# Patient Record
Sex: FEMALE | Race: WHITE | NOT HISPANIC OR LATINO | Employment: OTHER | URBAN - METROPOLITAN AREA
[De-identification: names, ages, dates, MRNs, and addresses within clinical notes are randomized per-mention and may not be internally consistent; named-entity substitution may affect disease eponyms.]

---

## 2017-01-13 ENCOUNTER — TELEPHONE (OUTPATIENT)
Dept: CARDIOLOGY | Facility: CLINIC | Age: 67
End: 2017-01-13

## 2017-01-13 DIAGNOSIS — I25.10 CORONARY ARTERY DISEASE INVOLVING NATIVE CORONARY ARTERY OF NATIVE HEART WITHOUT ANGINA PECTORIS: ICD-10-CM

## 2017-01-13 LAB
ALT SERPL W P-5'-P-CCNC: <5 U/L (ref 5–30)
CHOLEST SERPL-MCNC: 279 MG/DL
HDLC SERPL-MCNC: 60 MG/DL
LDLC SERPL CALC-MCNC: 197 MG/DL
NONHDLC SERPL-MCNC: 219 MG/DL
TRIGL SERPL-MCNC: 111 MG/DL

## 2017-01-13 PROCEDURE — 80061 LIPID PANEL: CPT | Performed by: INTERNAL MEDICINE

## 2017-01-13 PROCEDURE — 36415 COLL VENOUS BLD VENIPUNCTURE: CPT | Performed by: INTERNAL MEDICINE

## 2017-01-13 PROCEDURE — 84460 ALANINE AMINO (ALT) (SGPT): CPT | Performed by: INTERNAL MEDICINE

## 2017-01-13 NOTE — TELEPHONE ENCOUNTER
"3 week Lipids recheck after starting Pravastatin 40 mg daily as recommended at  with Dr. Bennett 12-22-16. Spoke with patient who states she was only able to take the Pravastatin for 2 weeks before her muscles began to ache and on her own stopped statin about 1 week ago. Muscles feel normal now. Patient states she has \"tried ALL of the statins and could not take any of them refuses to try any other oral agent.  Patient would be interested in trying the PCSK9 inhibitor.  but will be out of town until Feb 13th.   CHOL      279     HDL       60    LDL      197     TRIG      111     ALT < 5  Will message Dr. Bennett    "

## 2017-01-16 NOTE — TELEPHONE ENCOUNTER
Agree. She should call when she returns and Her insurance will determine which to recommend - Praluent or Repatha.  Thx. CD

## 2017-01-17 NOTE — TELEPHONE ENCOUNTER
Spoke with patient, she would like to try the PCSK9 inhibitor. She was in a trial at [facility] for the Pfizer product, so is aware of how the injections work. Will message Team 1 to start the process for this patient.

## 2017-01-18 ENCOUNTER — TELEPHONE (OUTPATIENT)
Dept: CARDIOLOGY | Facility: CLINIC | Age: 67
End: 2017-01-18

## 2017-01-18 DIAGNOSIS — E78.5 HYPERLIPIDEMIA LDL GOAL <130: Primary | ICD-10-CM

## 2017-01-18 NOTE — TELEPHONE ENCOUNTER
Repatha script submitted to  Specialty pharmacy as recommended by  on 12/22/16 office visit. Will contact patient when I hear back from  specialty pharmacy.

## 2017-01-27 ENCOUNTER — TELEPHONE (OUTPATIENT)
Dept: CARDIOLOGY | Facility: CLINIC | Age: 67
End: 2017-01-27

## 2017-01-27 NOTE — TELEPHONE ENCOUNTER
Call from patient, she wanted Dr. Bennett to know that she was approved for the repatha program and should received her first dose on 1/31/17.  Will update Dr. Bennett and Erm[facility] team

## 2017-02-09 ENCOUNTER — TELEPHONE (OUTPATIENT)
Dept: CARDIOLOGY | Facility: CLINIC | Age: 67
End: 2017-02-09

## 2017-02-09 DIAGNOSIS — E78.2 MIXED HYPERLIPIDEMIA: Primary | ICD-10-CM

## 2017-02-09 NOTE — TELEPHONE ENCOUNTER
Per Repatha Team - pt was approved for her repatha. Not sure what  likes for repeat lipids, but typically it is done after the first two doses. (4-6 weeks after starting)   Repatha started on 1 18-17. Then in 6 months and at 1 year. Lipids to be ordered.   Message left for patient to call back.

## 2017-02-09 NOTE — TELEPHONE ENCOUNTER
Spoke with patient who agrees to a FLP/ALT check on 2-27-17 ( 4 weeks after starting the repatha) . Second dose is 2-14-17. Patient states she is feeling well. No difficulties with injections.

## 2017-02-27 ENCOUNTER — TELEPHONE (OUTPATIENT)
Dept: CARDIOLOGY | Facility: CLINIC | Age: 67
End: 2017-02-27

## 2017-02-27 DIAGNOSIS — E78.2 MIXED HYPERLIPIDEMIA: ICD-10-CM

## 2017-02-27 LAB
ALT SERPL W P-5'-P-CCNC: <5 U/L (ref 5–30)
CHOLEST SERPL-MCNC: 172 MG/DL
HDLC SERPL-MCNC: 61 MG/DL
LDLC SERPL CALC-MCNC: 93 MG/DL
NONHDLC SERPL-MCNC: 111 MG/DL
TRIGL SERPL-MCNC: 88 MG/DL

## 2017-02-27 PROCEDURE — 84460 ALANINE AMINO (ALT) (SGPT): CPT | Performed by: INTERNAL MEDICINE

## 2017-02-27 PROCEDURE — 36415 COLL VENOUS BLD VENIPUNCTURE: CPT | Performed by: INTERNAL MEDICINE

## 2017-02-27 PROCEDURE — 80061 LIPID PANEL: CPT | Performed by: INTERNAL MEDICINE

## 2017-02-27 NOTE — TELEPHONE ENCOUNTER
FLP/ALT check on 2-27-17 ( 4 weeks after starting the repatha) . Second dose is 2-14-17. Patient states she is feeling well. No difficulties with injections.   Component      Latest Ref Rng & Units 2/27/2017   Cholesterol      <200 mg/dL 172   Triglycerides      <150 mg/dL 88   HDL Cholesterol      >49 mg/dL 61   LDL Cholesterol Calculated      <100 mg/dL 93   Non HDL Cholesterol      <130 mg/dL 111   ALT    <5.

## 2017-02-28 NOTE — TELEPHONE ENCOUNTER
Patient called with Lipid results. Patient expressed happiness that medication is working. Patient will continue with current medication . Is currently not taking Pravastatin and is only taking Repatha.

## 2017-03-03 ENCOUNTER — TELEPHONE (OUTPATIENT)
Dept: CARDIOLOGY | Facility: CLINIC | Age: 67
End: 2017-03-03

## 2017-03-03 NOTE — TELEPHONE ENCOUNTER
Call attempted to reach Ijeoma Woods at  Specialty pharmacy to ask if a formulary exception can be done on Repatha. Left message with call back number.

## 2017-03-03 NOTE — TELEPHONE ENCOUNTER
Patient  received letter from [employer] and needs A Formulary Exception for Humera had an injection last Tues and has 1 injectable in refrigerator.  Will forward to Humera ORONA

## 2017-03-08 ENCOUNTER — TELEPHONE (OUTPATIENT)
Dept: CARDIOLOGY | Facility: CLINIC | Age: 67
End: 2017-03-08

## 2017-03-08 NOTE — TELEPHONE ENCOUNTER
[facility] Prior Authorization Team   Phone: [phone]  Fax: [phone]    PA Initiation    Medication: Repatha Sureclick  Insurance Company: [employer] - Phone [phone] Fax [phone]  Pharmacy Filling the Rx: [facility] - [place], [place] - [address]  Filling Pharmacy Phone: [phone]  Filling Pharmacy Fax: [phone]  Start Date: 3/8/2017

## 2017-03-13 NOTE — TELEPHONE ENCOUNTER
PRIOR AUTHORIZATION DENIED    Medication: Repatha Sureclick - Denied    Denial Date: 3/11/2017    Denial Rational: Patient has not yet tried/failed Praluent (will require prior authorization).    Appeal Information: Appeals can be faxed to [employer] Part D appeals at [phone]. Please let us know if you would like to appeal this denial. If appealing, please provide a letter of medical necessity. If changing medication, please send new prescription for Praluent to [facility].

## 2017-03-15 ENCOUNTER — TELEPHONE (OUTPATIENT)
Dept: CARDIOLOGY | Facility: CLINIC | Age: 67
End: 2017-03-15

## 2017-03-15 NOTE — TELEPHONE ENCOUNTER
Patient states she received a letter from [employer] that they will not cover the Repatha.  Has had 2 injections and her cholesterol especially LDL has never been this low.  Will forward to Team 1 to help patient with REpatha coverage.

## 2017-03-23 ENCOUNTER — TELEPHONE (OUTPATIENT)
Dept: CARDIOLOGY | Facility: CLINIC | Age: 67
End: 2017-03-23

## 2017-03-23 DIAGNOSIS — I25.10 ATHEROSCLEROSIS OF NATIVE CORONARY ARTERY OF NATIVE HEART WITHOUT ANGINA PECTORIS: ICD-10-CM

## 2017-03-23 DIAGNOSIS — I25.2 HISTORY OF ST ELEVATION MYOCARDIAL INFARCTION (STEMI): ICD-10-CM

## 2017-03-23 DIAGNOSIS — I25.10 CORONARY ARTERY DISEASE INVOLVING NATIVE HEART WITHOUT ANGINA PECTORIS, UNSPECIFIED VESSEL OR LESION TYPE: Primary | ICD-10-CM

## 2017-03-23 DIAGNOSIS — E78.2 MIXED HYPERLIPIDEMIA: ICD-10-CM

## 2017-03-23 NOTE — TELEPHONE ENCOUNTER
Received call from pt inquiring about her Repatha Rx. Repatha was denied because it is not on the formulary - Rx PA for Praluent will need to be started. Sent new Rx for Praluent to  [facility]ty Pharmacy. Pt informed of same. Pt states she actually received a call yesterday from  Specialty pharmacy stating that the Repatha was ready to be shipped and they were wanting to arrange a ship date. I advised pt call that number back and clarify, as what I have in the chart states on 3/11/17 the Repatha was denied d/t not on formulary - pt would need to try Praluent first. Pt verbalized understanding - she will call for clarification with  Specialty pharmacy. In the meantime, I sent a new Rx for Praluent to get it started.         Telephone Encounter  Encounter Date: 3/8/2017  Ijeoma Woods      []Hide copied text  PRIOR AUTHORIZATION DENIED     Medication: Repatha Sureclick - Denied     Denial Date: 3/11/2017     Denial Rational: Patient has not yet tried/failed Praluent (will require prior authorization).     Appeal Information: Appeals can be faxed to [employer] Part D appeals at [phone]. Please let us know if you would like to appeal this denial. If appealing, please provide a letter of medical necessity. If changing medication, please send new prescription for Praluent to [facility].           Electronically signed by Ijeoma Woods at 3/13/2017 11:43 AM        Telephone on 3/8/2017              Detailed Report

## 2017-04-06 ENCOUNTER — TELEPHONE (OUTPATIENT)
Dept: CARDIOLOGY | Facility: CLINIC | Age: 67
End: 2017-04-06

## 2017-04-06 NOTE — TELEPHONE ENCOUNTER
Received VM from pt inquiring on status of Repatha. Pt states she needs the PCSK-9 inhibitor switched to Repatha, per her insurance ([employer]).     Previously spoke with the pt - per her chart Repatha was denied on 3/11 as not on formulary, she must try Praluent first. I sent in a new Rx for Praluent at that time (3/23) to get a PA process started.  Pt informed me she didn't understand how that could be, as she had already received a shipment and has taken 1 month worth of injections of Repatha (not samples). Pt states she also received a VM from the pharmacy wanting to set up the next shipment date. I instructed pt that day to contact  Specialty pharmacy for clarification. I did not hear back. Pt left VM again yesterday inquiring about status.    Placed call to Ijeoma at  Specialty [phone], left VM asking her to please investigate and call me back.

## 2017-04-18 DIAGNOSIS — I25.10 CORONARY ARTERY DISEASE INVOLVING NATIVE CORONARY ARTERY OF NATIVE HEART WITHOUT ANGINA PECTORIS: ICD-10-CM

## 2017-04-18 RX ORDER — CARVEDILOL 6.25 MG/1
6.25 TABLET ORAL 2 TIMES DAILY WITH MEALS
Qty: 186 TABLET | Refills: 2 | Status: SHIPPED | OUTPATIENT
Start: 2017-04-18 | End: 2017-08-25

## 2017-04-25 ENCOUNTER — TELEPHONE (OUTPATIENT)
Dept: CARDIOLOGY | Facility: CLINIC | Age: 67
End: 2017-04-25

## 2017-04-25 NOTE — TELEPHONE ENCOUNTER
[facility] Prior Authorization Team   Phone: [phone]  Fax: [phone]    PA Initiation    Medication: PRALUENT 75MG  Insurance Company: [employer] - Phone [phone] Fax [phone]  Pharmacy Filling the Rx: [facility] - [place], [place] - [address]  Filling Pharmacy Phone: [phone]  Filling Pharmacy Fax: [phone]  Start Date: 4/25/2017    INSURANCE IS ASKING FOR LDL LEVELS FROM WITHIN THE LAST 30 DAYS BEFORE THEY PROCESS THE PA. PLEASE ADVISE

## 2017-05-04 ENCOUNTER — TELEPHONE (OUTPATIENT)
Dept: CARDIOLOGY | Facility: CLINIC | Age: 67
End: 2017-05-04

## 2017-05-04 DIAGNOSIS — E78.2 MIXED HYPERLIPIDEMIA: Primary | ICD-10-CM

## 2017-05-04 NOTE — TELEPHONE ENCOUNTER
Call placed to  Specialty pharmacy PA team, as we are still unclear on our end whether or not the insurance is covering Repatha or Praluent. See previous telephone notes from March - April regarding this issue. Previous call placed on 4/6 but never heard back. Spoke with a member of the PA team who is going to forward this on to Ijeoma to investigate and get back to us.

## 2017-05-04 NOTE — TELEPHONE ENCOUNTER
Per insurance carrier - patient needs to have LDL within 30 days for them to process the PA for Praluent. Order placed. When the new LDL lab is received, we will need to send in previous higher LDL levels to the insurance company along with the new result, as patient has been on Repatha for a few months which has contributed to her recent lower LDL. LDL 1/13/17 was 197 and then after the first month on Repatha her LDL dropped to 97 on 2/27/17.     Called patient to schedule FLP lab. She booked an appt for tomorrow morning. Reviewed fasting requirements.

## 2017-05-05 DIAGNOSIS — E78.2 MIXED HYPERLIPIDEMIA: ICD-10-CM

## 2017-05-05 LAB
CHOLEST SERPL-MCNC: 208 MG/DL
HDLC SERPL-MCNC: 60 MG/DL
LDLC SERPL CALC-MCNC: 128 MG/DL
NONHDLC SERPL-MCNC: 148 MG/DL
TRIGL SERPL-MCNC: 100 MG/DL

## 2017-05-05 PROCEDURE — 80061 LIPID PANEL: CPT | Performed by: INTERNAL MEDICINE

## 2017-05-05 PROCEDURE — 36415 COLL VENOUS BLD VENIPUNCTURE: CPT | Performed by: INTERNAL MEDICINE

## 2017-05-08 NOTE — TELEPHONE ENCOUNTER
Prior Authorization Approval    Authorization Effective Date: 2/5/2017  Authorization Expiration Date: 5/6/2018  Medication: PRALUENT 75MG(APPROVED)  Approved Dose/Quantity: 2  Reference #: [id number]   Insurance Company: [employer] - Phone [phone] Fax [phone]  Expected CoPay:       CoPay Card Available:      Foundation Assistance Needed:    Which Pharmacy is filling the prescription (Not needed for infusion/clinic administered): [facility] - [place] [address]  Pharmacy Notified: Yes  Patient Notified: Yes

## 2017-05-12 ENCOUNTER — TELEPHONE (OUTPATIENT)
Dept: CARDIOLOGY | Facility: CLINIC | Age: 67
End: 2017-05-12

## 2017-05-12 NOTE — TELEPHONE ENCOUNTER
Patient called requesting the FLP results  done 5-5-17. Patient states her Feb FLP's were drawn while she was on Repatha. Then patient was changed to Praulent and FLP's drawn 5-5-17 were done after her second injection. Cholesterol and LDL were noted higher. Patient's third Praulent injection was 5-11-17.

## 2017-08-17 ENCOUNTER — DOCUMENTATION ONLY (OUTPATIENT)
Dept: LAB | Facility: CLINIC | Age: 67
End: 2017-08-17

## 2017-08-17 DIAGNOSIS — I25.10 CORONARY ARTERY DISEASE INVOLVING NATIVE CORONARY ARTERY OF NATIVE HEART WITHOUT ANGINA PECTORIS: ICD-10-CM

## 2017-08-17 DIAGNOSIS — D50.8 OTHER IRON DEFICIENCY ANEMIA: ICD-10-CM

## 2017-08-17 DIAGNOSIS — Z00.00 ROUTINE GENERAL MEDICAL EXAMINATION AT A HEALTH CARE FACILITY: Primary | ICD-10-CM

## 2017-08-17 DIAGNOSIS — E78.2 MIXED HYPERLIPIDEMIA: ICD-10-CM

## 2017-08-17 DIAGNOSIS — E55.9 VITAMIN D DEFICIENCY: ICD-10-CM

## 2017-08-17 DIAGNOSIS — R73.9 ELEVATED BLOOD SUGAR: ICD-10-CM

## 2017-08-17 NOTE — PROGRESS NOTES
Pt is coming for PV physical lab on 8/25/17. I have pended future orders. Please review, associate diagnosis, and sign pended order.     Thanks, Lab

## 2017-08-24 NOTE — PROGRESS NOTES
SUBJECTIVE:   Madonna Duque is a 66 year old female who presents for Preventive Visit.    Overall she is doing well, using injectable cholesterol med now as noted via cards, intol of statins.  Works out very reg.      Did have syncope, 3 weeks ago, very short lived.  Woke and nauseated, then went to bathroom and lh and then short lived syncope.  No chest pain or shortness of breath, none since or before, not having palp or lh.      She otherwise feels fine, 2 uti's this year, fine now, no vag c/o.  Had gi upset after trip, ok now.               Past Medical History:      Past Medical History:   Diagnosis Date     Abdominal discomfort 12/13    ct abd and pelvis no cause found     Chest pain 2/14    neg est echo     Colonic polyp 2005    fu 2011 nl and to fu 2021     Coronary artery disease involving native coronary artery of native heart without angina pectoris      Dizzy 2009    mri brain nl     DJD (degenerative joint disease)     neck     Elevated blood sugar      GERD (gastroesophageal reflux disease) 2007    egd, benign stricture     Hypercholesteremia 2010    crestor intolerant, lipitor intolerant     CORINA (iron deficiency anaemia) 2008    ugi and sbft nl, be nl     Left ventricular diastolic dysfunction 1/16    echo done for fu and nl ef, no wma     Osteopenia     fu dexa 2011 -1.9 fem neck and -1.2 spine, stable c/w 2007     PONV (postoperative nausea and vomiting)      STEMI (ST elevation myocardial infarction) (H) 12/15    in [place] on cruise, lytics then ptca of lad, 2 stents, had vfib arrest, ef on angio 67%, 40% rca, no other dz     Urethral strictures      Ventricular fibrillation (H) 12/15    during mi     Vitamin D deficiency              Past Surgical History:      Past Surgical History:   Procedure Laterality Date     C LIGATE FALLOPIAN TUBE,POSTPARTUM  85    Tubal Ligation     HC REMOVAL OF OVARY/TUBE(S)  80    Salpingo-Oophorectomy, Unilateral(endometriosis)     LAPAROTOMY EXPLORATORY  79     due to endomet, removed ovary     REPAIR PTOSIS BILATERAL  2/7/2012    Procedure:REPAIR PTOSIS BILATERAL; BILATERAL UPPER LID MECHANICAL PTOSIS REPAIR,and ptosis repair; Surgeon:JOVANA BETANCUR; Location[facility]             Social History:     Social History     Social History     Marital status:      Spouse name: N/A     Number of children: 1     Years of education: N/A     Occupational History     rn, retired 2015 [employer],[address]     Social History Main Topics     Smoking status: Former Smoker     Quit date: 1/28/1982     Smokeless tobacco: Never Used      Comment: quit 23 years ago     Alcohol use No     Drug use: No     Sexual activity: Yes     Partners: Male     Other Topics Concern     Special Diet No     heart healthy, weight watches      Exercise No     Social History Narrative             Family History:   reviewed         Allergies:     Allergies   Allergen Reactions     Crestor [Rosuvastatin]      Muscle aches, debilitating     Codeine Nausea and Vomiting     Shrimp Nausea and Vomiting             Medications:     Current Outpatient Prescriptions   Medication Sig Dispense Refill     carvedilol (COREG) 6.25 MG tablet Take 1 tablet (6.25 mg) by mouth 2 times daily (with meals) 186 tablet 3     STATIN NOT PRESCRIBED, INTENTIONAL, Please choose reason not prescribed, below       alirocumab (PRALUENT) 75 MG/ML injectable pen Inject 1 mL (75 mg) Subcutaneous every 14 days 2 mL 11     aspirin 81 MG EC tablet Take 1 tablet (81 mg) by mouth daily 90 tablet 3     Cholecalciferol (VITAMIN D3 PO) Take 5,000 Units by mouth daily       omeprazole (PRILOSEC) 20 MG capsule Take 20 mg by mouth daily.       [DISCONTINUED] carvedilol (COREG) 6.25 MG tablet Take 1 tablet (6.25 mg) by mouth 2 times daily (with meals) 186 tablet 2     nitroglycerin (NITROSTAT) 0.4 MG SL tablet Place 1 tablet (0.4 mg) under the tongue every 5 minutes as needed for chest pain If you are still having  "symptoms after 3 doses (15 minutes) call 911. 25 tablet 0               Review of Systems:   The 10 point Review of Systems is negative other than noted in the HPI           Physical Exam:   Blood pressure 103/68, pulse 73, temperature 98.2  F (36.8  C), temperature source Oral, height 5' 4.75\" (1.645 m), weight 145 lb (65.8 kg), SpO2 100 %, not currently breastfeeding.    Exam:  Constitutional: healthy appearing, alert and in no distress  Heent: Normocephalic. Head without obvious masses or lesions. PERRLDC, EOMI. Mouth exam within normal limits: tongue, mucous membranes, posterior pharynx all normal, no lesions or abnormalities seen.  Tm's and canals within normal limits bilaterally. Neck supple, no nuchal rigidity or masses. No supraclavicular, or cervical adenopathy. Thyroid symmetric, no masses.  Cardiovascular: Regular rate and rhythm, no murmer, rub or gallops.  JVP not elevated, no edema.  Carotids within normal limits bilaterally, no bruits.  Respiratory: Normal respiratory effort.  Lungs clear, normal flow, no wheezing or crackles.  Breasts: Normal bilaterally.  No masses or lesions.  Nipples within normal limits.  No axillary lesions or nodes.  Gastrointestinal: Normal active bowel sounds.   Soft, not tender, no masses, guarding or rebound.  No hepatosplenomegaly.   Musculoskeletal: extremities normal, no gross deformities noted.  Skin: no suspicious lesions or rashes   Neurologic: Mental status within normal limits.  Speech fluent.  No gross motor abnormalities and gait intact.  Psychiatric: mentation appears normal and affect normal.         Data:   Labs sent        Assessment:   1. Normal cpx  2. Ascvd, stents, stable on med therapy  3. Elevated cholesterol with statin intol, on psck9 med via cards, follow up labs  4. Vtach, due to mi, none since and follow up echo normal ef  5. Syncope, most c/w vasovagal.  However, given her pmh d/w patient need to follow up with cards and she will call there today " and let them know  6. Vit d defic, follow up labs  7. Osteopenia, calcium and vit d  8. Gerd, controlled  9. Colon polyp, up to date on follow up  10. hcm         Plan:   Up to date immunizations, getting mammogram today, up to date colon  Exercise, diet  Follow up cards as noted  Letter with labs      Jesse Tejada M.D.              Are you in the first 12 months of your Medicare Part B coverage?  No    Healthy Habits:    Do you get at least three servings of calcium containing foods daily (dairy, green leafy vegetables, etc.)? yes    Amount of exercise or daily activities, outside of work: 7 day(s) per week    Problems taking medications regularly No    Medication side effects: No    Have you had an eye exam in the past two years? yes    Do you see a dentist twice per year? yes    Do you have sleep apnea, excessive snoring or daytime drowsiness?no    COGNITIVE SCREEN  1) Repeat 3 items (Banana, Sunrise, Chair)    2) Clock draw:   3) 3 item recall:   Results: 3 items recalled: COGNITIVE IMPAIRMENT LESS LIKELY    Mini-CogTM Copyright S Silvino. Licensed by the author for use in [facility]; reprinted with permission (queta@[facility]). All rights reserved.                    Reviewed and updated as needed this visit by clinical staff         Reviewed and updated as needed this visit by Provider      Social History   Substance Use Topics     Smoking status: Former Smoker     Quit date: 1/28/1982     Smokeless tobacco: Never Used      Comment: quit 23 years ago     Alcohol use No       The patient does not drink >3 drinks per day nor >7 drinks per week.    Today's PHQ-2 Score:   PHQ-2 ( 1999 Pfizer) 11/8/2016 7/14/2016   Q1: Little interest or pleasure in doing things 0 0   Q2: Feeling down, depressed or hopeless 0 0   PHQ-2 Score 0 0         Do you feel safe in your environment - Yes    Do you have a Health Care Directive?: No: Advance care planning was reviewed with patient; patient declined at this  "time.      Current providers sharing in care for this patient include: Patient Care Team:  Jesse Tejada MD as PCP - General      Hearing impairment: No    Ability to successfully perform activities of daily living: Yes, no assistance needed     Fall risk:         Home safety:  none identified  click delete button to remove this line now    The following health maintenance items are reviewed in Epic and correct as of today:Health Maintenance   Topic Date Due     MAMMO SCREEN Q2 YR (SYSTEM ASSIGNED)  06/05/2017     FALL RISK ASSESSMENT  07/14/2017     INFLUENZA VACCINE (SYSTEM ASSIGNED)  09/01/2017     ADVANCE DIRECTIVE PLANNING Q5 YRS  12/17/2017     COLON CANCER SCREEN (SYSTEM ASSIGNED)  03/18/2021     LIPID SCREEN Q5 YR FEMALE (SYSTEM ASSIGNED)  05/05/2022     TETANUS IMMUNIZATION (SYSTEM ASSIGNED)  12/17/2022     DEXA SCAN SCREENING (SYSTEM ASSIGNED)  Completed     PNEUMOCOCCAL  Completed     HEPATITIS C SCREENING  Completed       End of Life Planning:  Patient currently has an advanced directive: No.  I have verified the patient's ablity to prepare an advanced directive/make health care decisions.  Literature was provided to assist patient in preparing an advanced directive.    COUNSELING:  Reviewed preventive health counseling, as reflected in patient instructions       Regular exercise       Healthy diet/nutrition        Estimated body mass index is 24.32 kg/(m^2) as calculated from the following:    Height as of 12/22/16: 5' 4.75\" (1.645 m).    Weight as of 12/22/16: 145 lb (65.8 kg).     reports that she quit smoking about 35 years ago. She has never used smokeless tobacco.        Appropriate preventive services were discussed with this patient, including applicable screening as appropriate for cardiovascular disease, diabetes, osteopenia/osteoporosis, and glaucoma.  As appropriate for age/gender, discussed screening for colorectal cancer, prostate cancer, breast cancer, and cervical cancer. " Checklist reviewing preventive services available has been given to the patient.    Reviewed patients plan of care and provided an AVS. The Basic Care Plan (routine screening as documented in Health Maintenance) for Madonna meets the Care Plan requirement. This Care Plan has been established and reviewed with the Patient.    Counseling Resources:  ATP IV Guidelines  Pooled Cohorts Equation Calculator  Breast Cancer Risk Calculator  FRAX Risk Assessment  ICSI Preventive Guidelines  Dietary Guidelines for Americans, 2010  USDA's MyPlate  ASA Prophylaxis  Lung CA Screening    Jesse Tejada MD  [facility]

## 2017-08-25 ENCOUNTER — OFFICE VISIT (OUTPATIENT)
Dept: FAMILY MEDICINE | Facility: CLINIC | Age: 67
End: 2017-08-25
Payer: COMMERCIAL

## 2017-08-25 ENCOUNTER — TELEPHONE (OUTPATIENT)
Dept: CARDIOLOGY | Facility: CLINIC | Age: 67
End: 2017-08-25

## 2017-08-25 ENCOUNTER — HOSPITAL ENCOUNTER (OUTPATIENT)
Dept: MAMMOGRAPHY | Facility: CLINIC | Age: 67
Discharge: HOME | End: 2017-08-25
Attending: INTERNAL MEDICINE | Admitting: INTERNAL MEDICINE
Payer: MEDICARE

## 2017-08-25 VITALS
WEIGHT: 145 LBS | SYSTOLIC BLOOD PRESSURE: 103 MMHG | DIASTOLIC BLOOD PRESSURE: 68 MMHG | BODY MASS INDEX: 24.16 KG/M2 | TEMPERATURE: 98.2 F | OXYGEN SATURATION: 100 % | HEIGHT: 65 IN | HEART RATE: 73 BPM

## 2017-08-25 DIAGNOSIS — R73.9 ELEVATED BLOOD SUGAR: ICD-10-CM

## 2017-08-25 DIAGNOSIS — E78.2 MIXED HYPERLIPIDEMIA: ICD-10-CM

## 2017-08-25 DIAGNOSIS — I49.01 VENTRICULAR FIBRILLATION (H): ICD-10-CM

## 2017-08-25 DIAGNOSIS — E55.9 VITAMIN D DEFICIENCY: ICD-10-CM

## 2017-08-25 DIAGNOSIS — Z12.31 VISIT FOR SCREENING MAMMOGRAM: ICD-10-CM

## 2017-08-25 DIAGNOSIS — K63.5 POLYP OF COLON, UNSPECIFIED PART OF COLON, UNSPECIFIED TYPE: ICD-10-CM

## 2017-08-25 DIAGNOSIS — Z00.00 ROUTINE GENERAL MEDICAL EXAMINATION AT A HEALTH CARE FACILITY: ICD-10-CM

## 2017-08-25 DIAGNOSIS — I21.02 ST ELEVATION MYOCARDIAL INFARCTION INVOLVING LEFT ANTERIOR DESCENDING (LAD) CORONARY ARTERY (H): ICD-10-CM

## 2017-08-25 DIAGNOSIS — K21.9 GASTROESOPHAGEAL REFLUX DISEASE WITHOUT ESOPHAGITIS: ICD-10-CM

## 2017-08-25 DIAGNOSIS — I25.10 CORONARY ARTERY DISEASE INVOLVING NATIVE CORONARY ARTERY OF NATIVE HEART WITHOUT ANGINA PECTORIS: ICD-10-CM

## 2017-08-25 DIAGNOSIS — R55 SYNCOPE, UNSPECIFIED SYNCOPE TYPE: ICD-10-CM

## 2017-08-25 DIAGNOSIS — D50.8 OTHER IRON DEFICIENCY ANEMIA: ICD-10-CM

## 2017-08-25 DIAGNOSIS — R55 SYNCOPE: Primary | ICD-10-CM

## 2017-08-25 DIAGNOSIS — M85.80 OSTEOPENIA, UNSPECIFIED LOCATION: ICD-10-CM

## 2017-08-25 DIAGNOSIS — Z00.00 ROUTINE GENERAL MEDICAL EXAMINATION AT A HEALTH CARE FACILITY: Primary | ICD-10-CM

## 2017-08-25 LAB
ALBUMIN SERPL-MCNC: 3.9 G/DL (ref 3.4–5)
ALP SERPL-CCNC: 102 U/L (ref 40–150)
ALT SERPL W P-5'-P-CCNC: 25 U/L (ref 0–50)
ANION GAP SERPL CALCULATED.3IONS-SCNC: 6 MMOL/L (ref 3–14)
AST SERPL W P-5'-P-CCNC: 19 U/L (ref 0–45)
BILIRUB SERPL-MCNC: 0.6 MG/DL (ref 0.2–1.3)
BUN SERPL-MCNC: 18 MG/DL (ref 7–30)
CALCIUM SERPL-MCNC: 9.7 MG/DL (ref 8.5–10.1)
CHLORIDE SERPL-SCNC: 105 MMOL/L (ref 94–109)
CHOLEST SERPL-MCNC: 173 MG/DL
CO2 SERPL-SCNC: 30 MMOL/L (ref 20–32)
CREAT SERPL-MCNC: 0.66 MG/DL (ref 0.52–1.04)
ERYTHROCYTE [DISTWIDTH] IN BLOOD BY AUTOMATED COUNT: 13.9 % (ref 10–15)
FERRITIN SERPL-MCNC: 32 NG/ML (ref 8–252)
GFR SERPL CREATININE-BSD FRML MDRD: 90 ML/MIN/1.7M2
GLUCOSE SERPL-MCNC: 108 MG/DL (ref 70–99)
HBA1C MFR BLD: 5.9 % (ref 4.3–6)
HCT VFR BLD AUTO: 41 % (ref 35–47)
HDLC SERPL-MCNC: 55 MG/DL
HGB BLD-MCNC: 13.6 G/DL (ref 11.7–15.7)
IRON SATN MFR SERPL: 18 % (ref 15–46)
IRON SERPL-MCNC: 74 UG/DL (ref 35–180)
LDLC SERPL CALC-MCNC: 81 MG/DL
MCH RBC QN AUTO: 29.7 PG (ref 26.5–33)
MCHC RBC AUTO-ENTMCNC: 33.2 G/DL (ref 31.5–36.5)
MCV RBC AUTO: 90 FL (ref 78–100)
NONHDLC SERPL-MCNC: 118 MG/DL
PLATELET # BLD AUTO: 246 10E9/L (ref 150–450)
POTASSIUM SERPL-SCNC: 4.7 MMOL/L (ref 3.4–5.3)
PROT SERPL-MCNC: 7.4 G/DL (ref 6.8–8.8)
RBC # BLD AUTO: 4.58 10E12/L (ref 3.8–5.2)
SODIUM SERPL-SCNC: 141 MMOL/L (ref 133–144)
TIBC SERPL-MCNC: 400 UG/DL (ref 240–430)
TRIGL SERPL-MCNC: 184 MG/DL
WBC # BLD AUTO: 5.9 10E9/L (ref 4–11)

## 2017-08-25 PROCEDURE — G0439 PPPS, SUBSEQ VISIT: HCPCS | Performed by: INTERNAL MEDICINE

## 2017-08-25 PROCEDURE — 80053 COMPREHEN METABOLIC PANEL: CPT | Performed by: INTERNAL MEDICINE

## 2017-08-25 PROCEDURE — 83036 HEMOGLOBIN GLYCOSYLATED A1C: CPT | Performed by: INTERNAL MEDICINE

## 2017-08-25 PROCEDURE — 82728 ASSAY OF FERRITIN: CPT | Performed by: INTERNAL MEDICINE

## 2017-08-25 PROCEDURE — 82306 VITAMIN D 25 HYDROXY: CPT | Performed by: INTERNAL MEDICINE

## 2017-08-25 PROCEDURE — 83550 IRON BINDING TEST: CPT | Performed by: INTERNAL MEDICINE

## 2017-08-25 PROCEDURE — 83540 ASSAY OF IRON: CPT | Performed by: INTERNAL MEDICINE

## 2017-08-25 PROCEDURE — 80061 LIPID PANEL: CPT | Performed by: INTERNAL MEDICINE

## 2017-08-25 PROCEDURE — 36415 COLL VENOUS BLD VENIPUNCTURE: CPT | Performed by: INTERNAL MEDICINE

## 2017-08-25 PROCEDURE — G0202 SCR MAMMO BI INCL CAD: HCPCS

## 2017-08-25 PROCEDURE — 85027 COMPLETE CBC AUTOMATED: CPT | Performed by: INTERNAL MEDICINE

## 2017-08-25 RX ORDER — CARVEDILOL 6.25 MG/1
6.25 TABLET ORAL 2 TIMES DAILY WITH MEALS
Qty: 186 TABLET | Refills: 3 | Status: SHIPPED | OUTPATIENT
Start: 2017-08-25 | End: 2017-09-27

## 2017-08-25 NOTE — TELEPHONE ENCOUNTER
Recommend decrease carvedilol to 3.125 mg BID.  Recommend 24 hour Holter. Would also suggest sleep study and follow-up with SIDNEY. This is probably vagal syncope but we are more vulnerable to symptoms if we have sleep apnea.  CD

## 2017-08-25 NOTE — MR AVS SNAPSHOT
After Visit Summary   8/25/2017    Madonna Duque    MRN: [id number]           Patient Information     Date Of Birth          1950        Visit Information        Provider Department      8/25/2017 8:00 AM Jesse Tejada MD [facility]        Today's Diagnoses     Routine general medical examination at a health care facility    -  1    Ventricular fibrillation (H)        ST elevation myocardial infarction involving left anterior descending (LAD) coronary artery (H)        Other iron deficiency anemia        Polyp of colon, unspecified part of colon, unspecified type        Mixed hyperlipidemia        Vitamin D deficiency        Osteopenia, unspecified location        Elevated blood sugar        Gastroesophageal reflux disease without esophagitis        Coronary artery disease involving native coronary artery of native heart without angina pectoris          Care Instructions      Preventive Health Recommendations    Female Ages 65 +    Yearly exam:     See your health care provider every year in order to  o Review health changes.   o Discuss preventive care.    o Review your medicines if your doctor has prescribed any.      You no longer need a yearly Pap test unless you've had an abnormal Pap test in the past 10 years. If you have vaginal symptoms, such as bleeding or discharge, be sure to talk with your provider about a Pap test.      Every 1 to 2 years, have a mammogram.  If you are over 69, talk with your health care provider about whether or not you want to continue having screening mammograms.      Every 10 years, have a colonoscopy. Or, have a yearly FIT test (stool test). These exams will check for colon cancer.       Have a cholesterol test every 5 years, or more often if your doctor advises it.       Have a diabetes test (fasting glucose) every three years. If you are at risk for diabetes, you should have this test more often.       At age 65, have a bone density  scan (DEXA) to check for osteoporosis (brittle bone disease).    Shots:    Get a flu shot each year.    Get a tetanus shot every 10 years.    Talk to your doctor about your pneumonia vaccines. There are now two you should receive - Pneumovax (PPSV 23) and Prevnar (PCV 13).    Talk to your doctor about the shingles vaccine.    Talk to your doctor about the hepatitis B vaccine.    Nutrition:     Eat at least 5 servings of fruits and vegetables each day.      Eat whole-grain bread, whole-wheat pasta and brown rice instead of white grains and rice.      Talk to your provider about Calcium and Vitamin D.     Lifestyle    Exercise at least 150 minutes a week (30 minutes a day, 5 days a week). This will help you control your weight and prevent disease.      Limit alcohol to one drink per day.      No smoking.       Wear sunscreen to prevent skin cancer.       See your dentist twice a year for an exam and cleaning.      See your eye doctor every 1 to 2 years to screen for conditions such as glaucoma, macular degeneration and cataracts.          Follow-ups after your visit        Your next 10 appointments already scheduled     Aug 25, 2017  9:45 AM CDT   MA SCREENING DIGITAL BILATERAL with SHBCMA4   [facility] ([facility])    [address] [place] [address], [address] [place] [id number] [phone]           Do not use any powder, lotion or deodorant under your arms or on your breast. If you do, we will ask you to remove it before your exam.  Wear comfortable, two-piece clothing.  If you have any allergies, tell your care team.  Bring any previous mammograms from other facilities or have them mailed to the breast center. Three-dimensional (3D) mammograms are available at [place] locations in [facility], and [place]. -Health locations include [place] [facility] in [place]. Benefits of 3D mammograms include: - Improved  "rate of cancer detection - Decreases your chance of having to go back for more tests, which means fewer: - \"False-positive\" results (This means that there is an abnormal area but it isn't cancer.) - Invasive testing procedures, such as a biopsy or surgery - Can provide clearer images of the breast if you have dense breast tissue. 3D mammography is an optional exam that anyone can have with a 2D mammogram. It doesn't replace or take the place of a 2D mammogram. 2D mammograms remain an effective screening test for all women.  Not all insurance companies cover the cost of a 3D mammogram. Check with your insurance.              Who to contact     If you have questions or need follow up information about today's clinic visit or your schedule please contact [facility] directly at [phone].  Normal or non-critical lab and imaging results will be communicated to you by [employer], letter or phone within 4 business days after the clinic has received the results. If you do not hear from us within 7 days, please contact the clinic through [employer] or phone. If you have a critical or abnormal lab result, we will notify you by phone as soon as possible.  Submit refill requests through [employer] or call your pharmacy and they will forward the refill request to us. Please allow 3 business days for your refill to be completed.          Additional Information About Your Visit        [employer] Information     [employer] lets you send messages to your doctor, view your test results, renew your prescriptions, schedule appointments and more. To sign up, go to [email or web address] . Click on \"Log in\" on the left side of the screen, which will take you to the Welcome page. Then click on \"Sign up Now\" on the right side of the page.     You will be asked to enter the access code listed below, as well as some personal information. Please follow the directions to create your username and password.     Your access code is: " [email or web address]  Expires: 2017  8:13 AM     Your access code will  in 90 days. If you need help or a new code, please call your [facility] clinic or [phone].        Care EveryWhere ID     This is your Care EveryWhere ID. This could be used by other organizations to access your [facility] medical records  [id number]        Your Vitals Were     Pulse Temperature Height Pulse Oximetry Breastfeeding? BMI (Body Mass Index)    73 98.2  F (36.8  C) (Oral) 5' 4.75\" (1.645 m) 100% No 24.32 kg/m2       Blood Pressure from Last 3 Encounters:   17 103/68   16 112/60   16 121/73    Weight from Last 3 Encounters:   17 145 lb (65.8 kg)   16 145 lb (65.8 kg)   16 144 lb (65.3 kg)              Today, you had the following     No orders found for display         Today's Medication Changes          These changes are accurate as of: 17  8:13 AM.  If you have any questions, ask your nurse or doctor.               Stop taking these medicines if you haven't already. Please contact your care team if you have questions.     EFFIENT 10 MG Tabs tablet   Generic drug:  prasugrel   Stopped by:  Jesse Tejada MD           evolocumab 140 MG/ML prefilled autoinjector   Commonly known as:  REPATHA   Stopped by:  Jesse Tejada MD                Where to get your medicines      These medications were sent to [facility] - [place], [place] - [address]  [address] , [facility] [address]    Hours:  Mon-Fri 8:30am-5:00pm Toll Free [phone] Phone:  [phone]     carvedilol 6.25 MG tablet                Primary Care Provider Office Phone # Fax #    Jesse Tejada [phone] [address]  [facility] [address]        Equal Access to Services     SORAIDA GARCIA AH: Steff Roldan, waaxda hossein jean waxay idiin hayaan adeeg kharash la'aan ah. So [facility] " [phone].    ATENCIÓN: Si vinny cosme, tiene a miller disposición servicios gratuitos de asistencia lingüística. Bridger vences [phone].    We comply with applicable federal civil rights laws and [place] laws. We do not discriminate on the basis of race, color, national origin, age, disability sex, sexual orientation or gender identity.            Thank you!     Thank you for choosing [facility]  for your care. Our goal is always to provide you with excellent care. Hearing back from our patients is one way we can continue to improve our services. Please take a few minutes to complete the written survey that you may receive in the mail after your visit with us. Thank you!             Your Updated Medication List - Protect others around you: Learn how to safely use, store and throw away your medicines at www.disposemymeds.org.          This list is accurate as of: 8/25/17  8:13 AM.  Always use your most recent med list.                   Brand Name Dispense Instructions for use Diagnosis    alirocumab 75 MG/ML injectable pen    PRALUENT    2 mL    Inject 1 mL (75 mg) Subcutaneous every 14 days    Mixed hyperlipidemia, Atherosclerosis of native coronary artery of native heart without angina pectoris, History of ST elevation myocardial infarction (STEMI)       aspirin 81 MG EC tablet     90 tablet    Take 1 tablet (81 mg) by mouth daily        carvedilol 6.25 MG tablet    COREG    186 tablet    Take 1 tablet (6.25 mg) by mouth 2 times daily (with meals)    Coronary artery disease involving native coronary artery of native heart without angina pectoris       nitroGLYcerin 0.4 MG sublingual tablet    NITROSTAT    25 tablet    Place 1 tablet (0.4 mg) under the tongue every 5 minutes as needed for chest pain If you are still having symptoms after 3 doses (15 minutes) call 911.        priLOSEC 20 MG CR capsule   Generic drug:  omeprazole      Take 20 mg by mouth daily.        VITAMIN D3 PO      Take 5,000  Units by mouth daily    Routine general medical examination at a health care facility

## 2017-08-25 NOTE — TELEPHONE ENCOUNTER
"Attempted to contact patient to review Dr. Bennett's recommendations to f/u episode of syncope: \"Recommend decrease carvedilol to 3.125 mg BID.  Recommend 24 hour Holter. Would also suggest sleep study and follow-up with SIDNEY. This is probably vagal syncope but we are more vulnerable to symptoms if we have sleep apnea\"  Left message for patient to call back to discuss Dr. Bennett's recommendations, gave patient instructions to decrease coreg to 3.125mg BID in case she cannot call back before the clinic closes today. Orders placed for holter, sleep study and SIDNEY visit. Will discuss with patient when she calls back.    Will message repatha team to review today's LDL and patient's concerns about injection site reaction.  "

## 2017-08-25 NOTE — NURSING NOTE
"Chief Complaint   Patient presents with     Medicare Visit       Initial /68  Pulse 73  Temp 98.2  F (36.8  C) (Oral)  Ht 5' 4.75\" (1.645 m)  Wt 145 lb (65.8 kg)  SpO2 100%  Breastfeeding? No  BMI 24.32 kg/m2 Estimated body mass index is 24.32 kg/(m^2) as calculated from the following:    Height as of this encounter: 5' 4.75\" (1.645 m).    Weight as of this encounter: 145 lb (65.8 kg).  Medication Reconciliation: complete   Bethanie PACK CMA      "

## 2017-08-25 NOTE — LETTER
[facility]  [address] [facility]  [address]  [place]  [address]  Tel: [phone]    August 28, 2017    Madonna Duque  [address] [facility] [address]        Dear Ms. Duqeu,    I am happy to report that your cbc or complete blood count is normal with no signs of anemia, leukemia or platelet abnormalities. Your chemistry panel shows no diabetes. Your sugar is just a bit high.  Please be sure to exercise and keep your weight down to keep this down. Your blood salts, kidney tests, liver tests, vitamin D level, iron studies, and proteins are all fine.    Your total cholesterol is 173 with the normal range being below 200.  Your HDL or good cholesterol is 55 with the normal range being above 50.  Your LDL or bad cholesterol is 81 with the normal range being below 130.  Overall these numbers are fine.    I am happy to bring you this overall excellent report.     If you have any further questions or problems, please contact our office.      Sincerely,    Jesse Tejada MD/ Bethanie PACK CMA  Results for orders placed or performed in visit on 08/25/17   CBC with platelets   Result Value Ref Range    WBC 5.9 4.0 - 11.0 10e9/L    RBC Count 4.58 3.8 - 5.2 10e12/L    Hemoglobin 13.6 11.7 - 15.7 g/dL    Hematocrit 41.0 35.0 - 47.0 %    MCV 90 78 - 100 fl    MCH 29.7 26.5 - 33.0 pg    MCHC 33.2 31.5 - 36.5 g/dL    RDW 13.9 10.0 - 15.0 %    Platelet Count 246 150 - 450 10e9/L   Comprehensive metabolic panel   Result Value Ref Range    Sodium 141 133 - 144 mmol/L    Potassium 4.7 3.4 - 5.3 mmol/L    Chloride 105 94 - 109 mmol/L    Carbon Dioxide 30 20 - 32 mmol/L    Anion Gap 6 3 - 14 mmol/L    Glucose 108 (H) 70 - 99 mg/dL    Urea Nitrogen 18 7 - 30 mg/dL    Creatinine 0.66 0.52 - 1.04 mg/dL    GFR Estimate 90 >60 mL/min/1.7m2    GFR Estimate If Black >90 >60 mL/min/1.7m2    Calcium 9.7 8.5 - 10.1 mg/dL    Bilirubin Total 0.6 0.2 - 1.3 mg/dL    Albumin 3.9 3.4 - 5.0 g/dL    Protein Total 7.4 6.8 - 8.8  g/dL    Alkaline Phosphatase 102 40 - 150 U/L    ALT 25 0 - 50 U/L    AST 19 0 - 45 U/L   Lipid panel reflex to direct LDL   Result Value Ref Range    Cholesterol 173 <200 mg/dL    Triglycerides 184 (H) <150 mg/dL    HDL Cholesterol 55 >49 mg/dL    LDL Cholesterol Calculated 81 <100 mg/dL    Non HDL Cholesterol 118 <130 mg/dL   Hemoglobin A1c   Result Value Ref Range    Hemoglobin A1C 5.9 4.3 - 6.0 %   Vitamin D Deficiency   Result Value Ref Range    Vitamin D Deficiency screening 66 20 - 75 ug/L   Iron and iron binding capacity   Result Value Ref Range    Iron 74 35 - 180 ug/dL    Iron Binding Cap 400 240 - 430 ug/dL    Iron Saturation Index 18 15 - 46 %   Ferritin   Result Value Ref Range    Ferritin 32 8 - 252 ng/mL               Enclosure: Lab Results

## 2017-08-25 NOTE — TELEPHONE ENCOUNTER
Call from patient, she has two concerns:  1) was seen at PMD clinic today and was encouraged to call Dr. Bennett to discuss episode of syncope. 3 weeks ago she awoke about 2 AM, nauseated with abdominal pain, she was on the toilet and lost consciousness for an unknown amount of time. She had no further episodes of syncope but notes that many afternoons at work she starts to feel sweaty and hot and wonders if this is her BP or blood sugar issues. She does stand most of her shift. She did have labs drawn at PMD today. Patient denies feeling any palpitations.     2) patient has completed her study with Dr. Perez for lipids. She states she discussed her PCSK9 therapy with Paula Cary and described problems using the prauluent: the injection site is red, itchy and hard for 3-4 days after her injection and she is concerned that her LDL increased after changing from rapatha to praluent. Patient had lipids done today and if LDL does not improve she wants to change back to repatha. She states she was changed due to insurance coverage. She has photos of her injection sites if needed.    Will message Dr. Bennett and mary team for review

## 2017-08-27 LAB — DEPRECATED CALCIDIOL+CALCIFEROL SERPL-MC: 66 UG/L (ref 20–75)

## 2017-08-28 NOTE — PROGRESS NOTES
It was a pleasure seeing you for your physical examination.  I wanted to get back to you with your test results.  I have enclosed a copy for your review.      I am happy to report that your cbc or complete blood count is normal with no signs of anemia, leukemia or platelet abnormalities. Your chemistry panel shows no diabetes. Your sugar is just a bit high.  Please be sure to exercise and keep your weight down to keep this down. Your blood salts, kidney tests, liver tests, vitamin D level, iron studies, and proteins are all fine.    Your total cholesterol is 173 with the normal range being below 200.  Your HDL or good cholesterol is 55 with the normal range being above 50.  Your LDL or bad cholesterol is 81 with the normal range being below 130.  Overall these numbers are fine.    I am happy to bring you this overall excellent report.  If you have any questions please call me.

## 2017-08-28 NOTE — TELEPHONE ENCOUNTER
"Patient returned call. Patient states she \"already feels better since decreasing the carvedilol dose on Friday. Other recommendation given by Dr. Bennett reviewed. Patient agrees with plan and transferred to scheduling. Patient will call with any questions or concerns.   "

## 2017-08-30 ENCOUNTER — HOSPITAL ENCOUNTER (OUTPATIENT)
Dept: CARDIOLOGY | Facility: CLINIC | Age: 67
Discharge: HOME | End: 2017-08-30
Attending: INTERNAL MEDICINE | Admitting: INTERNAL MEDICINE
Payer: MEDICARE

## 2017-08-30 DIAGNOSIS — R55 SYNCOPE: ICD-10-CM

## 2017-08-30 PROCEDURE — 93227 XTRNL ECG REC<48 HR R&I: CPT | Performed by: INTERNAL MEDICINE

## 2017-08-30 PROCEDURE — 93225 XTRNL ECG REC<48 HRS REC: CPT | Performed by: INTERNAL MEDICINE

## 2017-09-08 ENCOUNTER — OFFICE VISIT (OUTPATIENT)
Dept: SLEEP MEDICINE | Facility: CLINIC | Age: 67
End: 2017-09-08
Attending: INTERNAL MEDICINE
Payer: COMMERCIAL

## 2017-09-08 VITALS
OXYGEN SATURATION: 100 % | SYSTOLIC BLOOD PRESSURE: 122 MMHG | HEART RATE: 63 BPM | WEIGHT: 149 LBS | DIASTOLIC BLOOD PRESSURE: 81 MMHG | HEIGHT: 65 IN | BODY MASS INDEX: 24.83 KG/M2

## 2017-09-08 DIAGNOSIS — R55 SYNCOPE, UNSPECIFIED SYNCOPE TYPE: ICD-10-CM

## 2017-09-08 DIAGNOSIS — R06.83 SNORING: Primary | ICD-10-CM

## 2017-09-08 PROCEDURE — 99215 OFFICE O/P EST HI 40 MIN: CPT | Performed by: PHYSICIAN ASSISTANT

## 2017-09-08 NOTE — PROGRESS NOTES
Sleep Consultation:    Date on this visit: 9/8/2017    Madonna Duque is sent by Rd[facility] Bennett for a sleep consultation regarding syncope.    Primary Physician: Jesse Tejada     Madonna Duque presents because she had one episode of blacking out. She does not have much of a concern about her own sleep. Her medical history is significant for DJD, ventricular fibrillation and STEMI in 2015, GERD.    Her blacking out episode occurred at about 2-2:30 AM. She woke with an upset stomach and felt nauseous. She sat on the toilet, tilted her head back and the next thing she knew she woke up with her head resting on the sink. When she woke, she felt better and went back to sleep.    She had a normal ECHO in 1/2016, normal stress ECHO in 12/2016 and a recent Holter monitor that showed occasional supraventricular periodic beats.    Madonna goes to sleep at 11:00 PM during the week. She wakes up at 7:00 AM and lays in bed until about 8:00 AM without an alarm. She falls asleep in 10 minutes.  Madonna denies difficulty falling asleep.  She wakes up 2-3 times a night for 5-10 minutes before falling back to sleep.  Madonna wakes up to go to the bathroom.  On weekends, her sleep schedule is the same.  Patient gets an average of 9 hours of sleep per night. She does not take any sleep aids.    Patient does read in bed and does not use electronics in bed, watch TV in bed and worry in bed about anything.     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  She lives with her .      Madonna does snore every night but not constantly. Her snoring is moderately loud. Patient does have a regular bed partner.  Her  says she either has pauses in breathing or breathes so shallow that he cannot tell if she is breathing. These events do not appear to be terminated in a gasp or snort. They never sleep separately.  Patient sleeps on her back and side. She denies snort arousals, morning dry mouth, morning headaches,  morning confusion and restless legs. Madonna has no bruxism, sleep walking, sleep talking, dream enactment, sleep paralysis, cataplexy and hypnogogic/hypnopompic hallucinations.    Madonna denies difficulty breathing through her nose, claustrophobia and depression.  She will wake with reflux if she sleeps on her stomach.    Madonna has kept a stable weight in the last 5 years.  Patient describes themself as a night person.  She would prefer to go to sleep at 11:00 PM and wake up at 7:00 AM.  Patient's [place] Sleepiness score 5/24 inconsistent with daytime sleepiness.      Madonna denies taking naps. She was very tired when she was on a higher dose of carvedilol. Reducing the dose helped considerably. She takes some inadvertant naps if bored in the evening.  She denies dozing while driving.  Patient was counseled on the importance of driving while alert, to pull over if drowsy, or nap before getting into the vehicle if sleepy.  She uses rare caffeine.     Allergies:    Allergies   Allergen Reactions     Crestor [Rosuvastatin]      Muscle aches, debilitating     Codeine Nausea and Vomiting     Shrimp Nausea and Vomiting       Medications:    Current Outpatient Prescriptions   Medication Sig Dispense Refill     carvedilol (COREG) 6.25 MG tablet Take 1 tablet (6.25 mg) by mouth 2 times daily (with meals) 186 tablet 3     STATIN NOT PRESCRIBED, INTENTIONAL, Please choose reason not prescribed, below       alirocumab (PRALUENT) 75 MG/ML injectable pen Inject 1 mL (75 mg) Subcutaneous every 14 days 2 mL 11     nitroglycerin (NITROSTAT) 0.4 MG SL tablet Place 1 tablet (0.4 mg) under the tongue every 5 minutes as needed for chest pain If you are still having symptoms after 3 doses (15 minutes) call 911. 25 tablet 0     aspirin 81 MG EC tablet Take 1 tablet (81 mg) by mouth daily 90 tablet 3     Cholecalciferol (VITAMIN D3 PO) Take 5,000 Units by mouth daily       omeprazole (PRILOSEC) 20 MG capsule Take 20 mg by mouth daily.          Problem List:  Patient Active Problem List    Diagnosis Date Noted     Coronary artery disease involving native coronary artery of native heart without angina pectoris      Priority: Medium     Gastroesophageal reflux disease without esophagitis 07/14/2016     Priority: Medium     Left ventricular diastolic dysfunction      Priority: Medium     Elevated blood sugar      Priority: Medium     Ventricular fibrillation (H)      Priority: Medium     during mi       STEMI (ST elevation myocardial infarction) (HCC)      Priority: Medium     in [place] on cruise, lytics then ptca of [place], 2 stents, had vfib arrest, ef on angio 67%, 40% rca, no other dz       Advanced directives, counseling/discussion 12/17/2012     Priority: Medium     Discussed advance care planning with patient; however, patient declined at this time. 12/17/2012 Wendy LEDESMA MA           Vitamin D deficiency      Priority: Medium     Mixed hyperlipidemia 01/28/2012     Priority: Medium     Urethral stricture      Priority: Medium     (Problem list name updated by automated process. Provider to review and confirm.)       DJD (degenerative joint disease)      Priority: Medium     neck       Osteopenia      Priority: Medium     Colonic polyp      Priority: Medium     fu 2011 nl       Iron deficiency anemia      Priority: Medium     ugi and sbft nl, be nl  Diagnosis updated by automated process. Provider to review and confirm.          Past Medical/Surgical History:  Past Medical History:   Diagnosis Date     Abdominal discomfort 12/13    ct abd and pelvis no cause found     Chest pain 2/14    neg est echo     Colonic polyp 2005 fu 2011 nl and to fu 2021     Coronary artery disease involving native coronary artery of native heart without angina pectoris      Dizzy 2009    mri brain nl     DJD (degenerative joint disease)     neck     Elevated blood sugar      GERD (gastroesophageal reflux disease) 2007    egd, benign stricture     Hypercholesteremia  2010    crestor intolerant, lipitor intolerant     CORINA (iron deficiency anaemia) 2008    ugi and sbft nl, be nl     Left ventricular diastolic dysfunction 1/16    echo done for fu and nl ef, no wma     Osteopenia     fu dexa 2011 -1.9 fem neck and -1.2 spine, stable c/w 2007     PONV (postoperative nausea and vomiting)      STEMI (ST elevation myocardial infarction) (H) 12/15    in [place] on cruise, lytics then ptca of lad, 2 stents, had vfib arrest, ef on angio 67%, 40% rca, no other dz     Syncope     episode August 2017, possible vasovagal     Urethral strictures      Ventricular fibrillation (H) 12/15    during mi     Vitamin D deficiency      Past Surgical History:   Procedure Laterality Date     C LIGATE FALLOPIAN TUBE,POSTPARTUM  84    Tubal Ligation     HC REMOVAL OF OVARY/TUBE(S)  80    Salpingo-Oophorectomy, Unilateral(endometriosis)     LAPAROTOMY EXPLORATORY  79    due to endomet, removed ovary     REPAIR PTOSIS BILATERAL  2/7/2012    Procedure:REPAIR PTOSIS BILATERAL; BILATERAL UPPER LID MECHANICAL PTOSIS REPAIR,and ptosis repair; Surgeon:JOVANA BETANCUR; Location:[facility]       Social History:  Social History     Social History     Marital status:      Spouse name: N/A     Number of children: 1     Years of education: N/A     Occupational History     rn, retired 2015 [employer],[address]     Social History Main Topics     Smoking status: Former Smoker     Quit date: 1/28/1982     Smokeless tobacco: Never Used      Comment: quit 23 years ago     Alcohol use No     Drug use: No     Sexual activity: Yes     Partners: Male     Other Topics Concern     Special Diet No     heart healthy, weight watches      Exercise No     Social History Narrative       Family History:  Family History   Problem Relation Age of Onset     DIABETES Father      CANCER Father      [place]     CANCER Mother      CLL     DIABETES Paternal Aunt      DIABETES Paternal Aunt      DIABETES Paternal  "Aunt      DIABETES Paternal Uncle      DIABETES Paternal Uncle      DIABETES Paternal Uncle      Breast Cancer Paternal Aunt      Cancer - colorectal Paternal Aunt      Cancer - colorectal Paternal Aunt      CANCER Paternal Uncle      bladder       Review of Systems:  A complete review of systems reviewed by me is negative with the exeption of what has been mentioned in the history of present illness.  CONSTITUTIONAL: NEGATIVE for weight gain/loss, fever, chills, sweats or night sweats, drug allergies.  EYES: NEGATIVE for changes in vision, blind spots, double vision.  ENT: NEGATIVE for ear pain, sore throat, sinus pain, post-nasal drip, runny nose, bloody nose  CARDIAC: NEGATIVE for fast heartbeats or fluttering in chest, chest pain or pressure, breathlessness when lying flat, swollen legs or swollen feet.  CARDIAC:  POSITIVE for  Heart disease  NEUROLOGIC: NEGATIVE headaches, weakness or numbness in the arms or legs.  DERMATOLOGIC: NEGATIVE for rashes, new moles or change in mole(s)  PULMONARY: NEGATIVE SOB at rest, SOB with activity, dry cough, productive cough, coughing up blood, wheezing or whistling when breathing.    GASTROINTESTINAL: NEGATIVE for nausea or vomitting, loose or watery stools, fat or grease in stools, constipation, abdominal pain, bowel movements black in color or blood noted.  GENITOURINARY: NEGATIVE for pain during urination, blood in urine, urinating more frequently than usual, irregular menstrual periods.  MUSCULOSKELETAL: NEGATIVE for muscle pain, bone pain, swollen joints.  MUSCULOSKELETAL:  POSITIVE for joint pain- left knee  ENDOCRINE: NEGATIVE for increased thirst or urination, diabetes.  LYMPHATIC: NEGATIVE for swollen lymph nodes, lumps or bumps in the breasts or nipple discharge.    Physical Examination:  Vitals: /81  Pulse 63  Ht 1.645 m (5' 4.76\")  Wt 67.6 kg (149 lb)  SpO2 100%  BMI 24.98 kg/m2  Neck Circumference: 35 cm.     [place] Total Score 9/8/2017   Total score " - [place] 5       GENERAL APPEARANCE: healthy, alert, no distress and cooperative  EYES: Eyes grossly normal to inspection, PERRL, conjunctivae pale and sclerae normal and lids and lashes normal  HENT: nose and mouth without ulcers or lesions, oral mucous membranes moist and oropharynx clear  NECK: no adenopathy, no asymmetry, masses, or scars, thyroid normal to palpation and trachea midline and normal to palpation  RESP: lungs clear to auscultation - no rales, rhonchi or wheezes  CV: regular rates and rhythm, normal S1 S2, no S3 or S4, no murmur, click or rub and no irregular beats  LYMPHATICS: normal ant/post cervical and supraclavicular nodes  MS: extremities normal- no gross deformities noted  NEURO: Normal strength and tone, mentation intact, speech normal and cranial nerves 2-12 intact  Mallampati Class: II.  Tonsillar Stage: 1  hidden by pillars.    Impression/Plan:    (R06.83) Snoring  (primary encounter diagnosis)  Comment: Madonna presents for concerns of apnea. She had an episode of syncope when she woke in the middle of the night. She does snore. The snoring is not particularly loud and she does not snore all of the time. There are no snorts or gasps observed. Her  has been concerned that she may have stopped breathing at times, but it seems that she is just breathing so quietly that her  can't hear it. Aside from snoring, her only other positive risk factor on the STOP BANG is age over 50 (66). She does wake to urinate 1-3 times per night and she will have reflux at night if she sleep on her stomach. She has had an MI and has some periodic beats on Holter monitoring. She tends to have low blood pressure and pulse. Her BMI is normal at 24 and neck circumference normal at 35 cm. She denies sleepiness since having her carvedilol dose reduced (ESS 5/24).  Plan: Overnight oximetry study        We will screen for STORM with oximetry. I will call her with results and order an in lab PSG if the  results are abnormal.      Literature provided regarding sleep apnea.      She will follow up with me in the future as needed.       Polysomnography reviewed.  Obstructive sleep apnea reviewed.  Complications of untreated sleep apnea were reviewed.  45 minutes was spent during this visit, over 50% in counseling and coordination of care.   Bennett Goltz, PA-C    CC:  MD Jesse Vilchis MD

## 2017-09-08 NOTE — PATIENT INSTRUCTIONS
"MY TREATMENT INFORMATION FOR SLEEP APNEA-  Madonna Duque    DOCTOR : Bennett Ezra Goltz, PA-C  SLEEP CENTER : [facility]     MY CONTACT NUMBER: [phone]    Am I having a sleep study at a sleep center?  Make sure you have an appointment for the study before you leave!    Am I having a home sleep study?  Watch this video:  https://www.[employer].com/watch?v=CteI_GhyP9g&list=PLC4F_[email or web address]    Frequently asked questions:  1. What is Obstructive Sleep Apnea (STORM)? STORM is the most common type of sleep apnea. Apnea means, \"without breath.\"  Apnea is most often caused by narrowing or collapse of the upper airway as muscles relax during sleep.   Almost everyone has occasional apneas. Most people with sleep apnea have had brief interruptions at night frequently for many years.  The severity of sleep apnea is related to how frequent and severe the events are.   2. What are the consequences of STORM? Symptoms include: feeling sleepy during the day, snoring loudly, gasping or stopping of breathing, trouble sleeping, and occasionally morning headaches or heartburn at night.  Sleepiness can be serious and even increase the risk of falling asleep while driving. Other health consequences may include development of high blood pressure and other cardiovascular disease in persons who are susceptible. Untreated STORM  can contribute to heart disease, stroke and diabetes.   3. What are the treatment options? In most situations, sleep apnea is a lifelong disease that must be managed with daily therapy. Medications are not effective for sleep apnea and surgery is generally not considered until other therapies have been tried. Your treatment is your choice . Continuous Positive Airway (CPAP) works right away and is the therapy that is effective in nearly everyone. An oral device to hold your jaw forward is usually the next most reliable option. Other options include postioning devices (to keep you off your back), weight " loss, and surgery including a tongue pacing device. There is more detail about some of these options below.    Important tips for using CPAP and similar devices   Know your equipment:  CPAP is continuous positive airway pressure that prevents obstructive sleep apnea by keeping the throat from collapsing while you are sleeping. In most cases, the device is  smart  and can slowly self-adjusts if your throat collapses and keeps a record every day of how well you are treated-this information is available to you and your care team.  BPAP is bilevel positive airway pressure that keeps your throat open and also assists each breath with a pressure boost to maintain adequate breathing.  Special kinds of BPAP are used in patients who have inadequate breathing from lung or heart disease. In most cases, the device is  smart  and can slowly self-adjusts to assist breathing. Like CPAP, the device keeps a record of how well you are treated.  Your mask is your connection to the device. You get to choose what feels most comfortable and the staff will help to make sure if fits. Here: are some examples of the different masks that are available:       Key points to remember on your journey with sleep apnea:  1. Sleep study.  PAP devices often need to be adjusted during a sleep study to show that they are effective and adjusted right.  2. Good tips to remember: Try wearing just the mask during a quiet time during the day so your body adapts to wearing it. A humidifier is recommended for comfort in most cases to prevent drying of your nose and throat. Allergy medication from your provider may help you if you are having nasal congestion.  3. Getting settled-in. It takes more than one night for most of us to get used to wearing a mask. Try wearing just the mask during a quiet time during the day so your body adapts to wearing it. A humidifier is recommended for comfort in most cases. Our team will work with you carefully on the first day  and will be in contact within 4 days and again at 2 and 4 weeks for advice and remote device adjustments. Your therapy is evaluated by the device each day.   4. Use it every night. The more you are able to sleep naturally for 7-8 hours, the more likely you will have good sleep and to prevent health risks or symptoms from sleep apnea. Even if you use it 4 hours it helps. Occasionally all of us are unable to use a medical therapy, in sleep apnea, it is not dangerous to miss one night.   5. Communicate. Call our skilled team on the number provided on the first day if your visit for problems that make it difficult to wear the device. Over 2 out of 3 patients can learn to wear the device long-term with help from our team. Remember to call our team or your sleep providers if you are unable to wear the device as we may have other solutions for those who cannot adapt to mask CPAP therapy. It is recommended that you sleep your sleep provider within the first 3 months and yearly after that if you are not having problems.   Take care of your equipment. Make sure you clean your mask and tubing using directions every day and that your filter and mask are replaced as recommended or if they are not working.     BESIDES CPAP, WHAT OTHER THERAPIES ARE THERE?  Positioning Device  Positioning devices are generally used when sleep apnea is mild and only occurs on your back.This example shows a pillow that straps around the waist. It may be appropriate for those whose sleep study shows milder sleep apnea that occurs primarily when lying flat on one's back. Preliminary studies have shown benefit but effectiveness at home may need to be verified by a home sleep test. These devices are generally not covered by medical insurance.    Oral Appliance  What is oral appliance therapy?  An oral appliance device fits on your teeth at night like a retainer used after having braces. The device is made by a specialized dentist and requires several  visits over 1-2 months before a manufactured device is made to fit your teeth and is adjusted to prevent your sleep apnea. Once an oral device is working properly, snoring should be improved. A home sleep test may be recommended at that time if to determine whether the sleep apnea is adequately treated.       Some things to remember:  -Oral devices are often, but not always, covered by your medical insurance. Be sure to check with your insurance provider.   -If you are referred for oral therapy, you will be given a list of specialized dentists to consider or you may choose to visit the Web site of the American Academy of Dental Sleep Medicine  -Oral devices are less likely to work if you have severe sleep apnea or are extremely overweight.     More detailed information  An oral appliance is a small acrylic device that fits over the upper and lower teeth  (similar to a retainer or a mouth guard). This device slightly moves jaw forward, which moves the base of the tongue forward, opens the airway, improves breathing for effective treat snoring and obstructive sleep apnea in perhaps 7 out of 10 people .  The best working devices are custom-made by a dental device  after a mold is made of the teeth 1, 2, 3.  When is an oral appliance indicated?  Oral appliance therapy is recommended as a first-line treatment for patients with primary snoring, mild sleep apnea, and for patients with moderate sleep apnea who prefer appliance therapy to use of CPAP4, 5. Severity of sleep apnea is determined by sleep testing and is based on the number of respiratory events per hour of sleep.   How successful is oral appliance therapy?  The success rate of oral appliance therapy in patients with mild sleep apnea is 75-80% while in patients with moderate sleep apnea it is 50-70%. The chance of success in patients with severe sleep apnea is 40-50%. The research also shows that oral appliances have a beneficial effect on the  cardiovascular health of STORM patients at the same magnitude as CPAP therapy7.  Oral appliances should be a second-line treatment in cases of severe sleep apnea, but if not completely successful then a combination therapy utilizing CPAP plus oral appliance therapy may be effective. Oral appliances tend to be effective in a broad range of patients although studies show that the patients who have the highest success are females, younger patients, those with milder disease, and less severe obesity. 3, 6.   Finding a dentist that practices dental sleep medicine  Specific training is available through the American Academy of Dental Sleep Medicine for dentists interested in working in the field of sleep. To find a dentist who is educated in the field of sleep and the use of oral appliances, near you, visit the Web site of the American Academy of Dental Sleep Medicine.    References  1. Marcos et al. Objectively measured vs self-reported compliance during oral appliance therapy for sleep-disordered breathing. Chest 2013; 144(5): 1985-3188.  2. Cathy et al. Objective measurement of compliance during oral appliance therapy for sleep-disordered breathing. Thorax 2013; 68(1): 91-96.  3. Jorge, et al. Mandibular advancement devices in 620 men and women with STORM and snoring: tolerability and predictors of treatment success. Chest 2004; 125: 6846-4742.  4. Se, et al. Oral appliances for snoring and STORM: a review. Sleep 2006; 29: 244-262.  5. Kolby et al. Oral appliance treatment for STORM: an update. J Clin Sleep Med 2014; 10(2): 215-227.  6. Janna et al. Predictors of OSAH treatment outcome. J Dent Res 2007; 86: 6394-7127.    Weight Loss:    Weight loss is a long-term strategy that may improve sleep apnea in some patients.    Weight management is a personal decision.  If you are interested in exploring weight loss strategies, the following discussion covers the impact on weight loss on sleep apnea and  the approaches that may be successful.    Weight loss decreases severity of sleep apnea in most people with obesity. For those with mild obesity who have developed snoring with weight gain, even 15-30 pound weight loss can improve and occasionally eliminate sleep apnea.  Structured and life-long dietary and health habits are necessary to lose weight and keep healthier weight levels.     Though there may be significant health benefits from weight loss, long-term weight loss is very difficult to achieve- studies show success with dietary management in less than 10% of people. In addition, substantial weight loss may require years of dietary control and may be difficult if patients have severe obesity. In these cases, surgical management may be considered.  Finally, older individuals who have tolerated obesity without health complications may be less likely to benefit from weight loss strategies.      Your BMI is Body mass index is 24.98 kg/(m^2).  Weight management is a personal decision.  If you are interested in exploring weight loss strategies, the following discussion covers the approaches that may be successful. Body mass index (BMI) is one way to tell whether you are at a healthy weight, overweight, or obese. It measures your weight in relation to your height.  A BMI of 18.5 to 24.9 is in the healthy range. A person with a BMI of 25 to 29.9 is considered overweight, and someone with a BMI of 30 or greater is considered obese. More than two-thirds of American adults are considered overweight or obese.  Being overweight or obese increases the risk for further weight gain. Excess weight may lead to heart disease and diabetes.  Creating and following plans for healthy eating and physical activity may help you improve your health.  Weight control is part of healthy lifestyle and includes exercise, emotional health, and healthy eating habits. Careful eating habits lifelong are the mainstay of weight control. Though  there are significant health benefits from weight loss, long-term weight loss with diet alone may be very difficult to achieve- studies show long-term success with dietary management in less than 10% of people. Attaining a healthy weight may be especially difficult to achieve in those with severe obesity. In some cases, medications, devices and surgical management might be considered.  What can you do?  If you are overweight or obese and are interested in methods for weight loss, you should discuss this with your provider.     Consider reducing daily calorie intake by 500 calories.     Keep a food journal.     Avoiding skipping meals, consider cutting portions instead.    Diet combined with exercise helps maintain muscle while optimizing fat loss. Strength training is particularly important for building and maintaining muscle mass. Exercise helps reduce stress, increase energy, and improves fitness. Increasing exercise without diet control, however, may not burn enough calories to loose weight.       Start walking three days a week 10-20 minutes at a time    Work towards walking thirty minutes five days a week     Eventually, increase the speed of your walking for 1-2 minutes at time    In addition, we recommend that you review healthy lifestyles and methods for weight loss available through the National Institutes of Health patient information sites:  http://win.niddk.nih.gov/publications/index.htm    And look into health and wellness programs that may be available through your health insurance provider, employer, local community center, or grant club.    Surgery:    Surgery for obstructive sleep apnea is considered generally only when other therapies fail to work. Surgery may be discussed with you if you are having a difficult time tolerating CPAP and or when there is an abnormal structure that requires surgical correction.  Nose and throat surgeries often enlarge the airway to prevent collapse.  Most of these  surgeries create pain for 1-2 weeks and up to half of the most common surgeries are not effective throughout life.  You should carefully discuss the benefits and drawbacks to surgery with your sleep provider and surgeon to determine if it is the best solution for you.   More information  Surgery for STORM is directed at areas that are responsible for narrowing or complete obstruction of the airway during sleep.  There are a wide range of procedures available to enlarge and/or stabilize the airway to prevent blockage of breathing in the three major areas where it can occur: the palate, tongue, and nasal regions.  Successful surgical treatment depends on the accurate identification of the factors responsible for obstructive sleep apnea in each person.  A personalized approach is required because there is no single treatment that works well for everyone.  Because of anatomic variation, consultation with an examination by a sleep surgeon is a critical first step in determining what surgical options are best for each patient.  In some cases, examination during sedation may be recommended in order to guide the selection of procedures.  Patients will be counseled about risks and benefits as well as the typical recovery course after surgery. Surgery is typically not a cure for a person s STORM.  However, surgery will often significantly improve one s STORM severity (termed  success rate ).  Even in the absence of a cure, surgery will decrease the cardiovascular risk associated with OSA7; improve overall quality of life8 (sleepiness, functionality, sleep quality, etc).  Palate Procedures:  Patients with STORM often have narrowing of their airway in the region of their tonsils and uvula.  The goals of palate procedures are to widen the airway in this region as well as to help the tissues resist collapse.  Modern palate procedure techniques focus on tissue conservation and soft tissue rearrangement, rather than tissue removal.  Often  the uvula is preserved in this procedure. Residual sleep apnea is common in patient after pharyngoplasty with an average reduction in sleep apnea events of 33%2.    Tongue Procedures:  ExamWhile patients are awake, the muscles that surround the throat are active and keep this region open for breathing. These muscles relax during sleep, allowing the tongue and other structures to collapse and block breathing.  There are several different tongue procedures available.  Selection of a tongue base procedure depends on characteristics seen on physical exam.  Generally, procedures are aimed at removing bulky tissues in this area or preventing the back of the tongue from falling back during sleep.  Success rates for tongue surgery range from 50-62%3.  Hypoglossal Nerve Stimulation:  Hypoglossal nerve stimulation has recently received approval from the [place] Food and Drug Administration for the treatment of obstructive sleep apnea.  This is based on research showing that the system was safe and effective in treating sleep apnea6.  Results showed that the median AHI score decreased 68%, from 29.3 to 9.0. This therapy uses an implant system that senses breathing patterns and delivers mild stimulation to airway muscles, which keeps the airway open during sleep.  The system consists of three fully implanted components: a small generator (similar in size to a pacemaker), a breathing sensor, and a stimulation lead.  Using a small handheld remote, a patient turns the therapy on before bed and off upon awakening.    Candidates for this device must be greater than 22 years of age, have moderate to severe STORM (AHI between 20-65), BMI less than 32, have tried CPAP/oral appliance without success, and have appropriate upper airway anatomy (determined by a sleep endoscopy performed by Dr. Quintana).  Hypoglossal Nerve Stimulation Pathway:    The sleep surgeon s office will work with the patient through the insurance  prior-authorization process (including communications and appeals).    Nasal Procedures:  Nasal obstruction can interfere with nasal breathing during the day and night.  Studies have shown that relief of nasal obstruction can improve the ability of some patients to tolerate positive airway pressure therapy for obstructive sleep apnea1.  Treatment options include medications such as nasal saline, topical corticosteroid and antihistamine sprays, and oral medications such as antihistamines or decongestants. Non-surgical treatments can include external nasal dilators for selected patients. If these are not successful by themselves, surgery can improve the nasal airway either alone or in combination with these other options.  Combination Procedures:  Combination of surgical procedures and other treatments may be recommended, particularly if patients have more than one area of narrowing or persistent positional disease.  The success rate of combination surgery ranges from 66-80%2,3.    References  1. Charly ROGEL. The Role of the Nose in Snoring and Obstructive Sleep Apnoea: An Update.  Eur Arch Otorhinolaryngol. 2011; 268: 1365-73.  2.  Yris SM; Rc JA; Linnette JR; Pallanch JF; Xiomara MB; Manuel SG; Ezio GARCIA. Surgical modifications of the upper airway for obstructive sleep apnea in adults: a systematic review and meta-analysis. SLEEP 2010;33(10):3585-6016. Cami ARAIZA. Hypopharyngeal surgery in obstructive sleep apnea: an evidence-based medicine review.  Arch Otolaryngol Head Neck Surg. 2006 Feb;132(2):206-13.  3. Will YH1, Zabrina Y, Jayme IRAIS. The efficacy of anatomically based multilevel surgery for obstructive sleep apnea. Otolaryngol Head Neck Surg. 2003 Oct;129(4):327-35.  4. Kezirian E, Goldberg A. Hypopharyngeal Surgery in Obstructive Sleep Apnea: An Evidence-Based Medicine Review. Arch Otolaryngol Head Neck Surg. 2006 Feb;132(2):206-13.  5. Nathan FARAH et al. Upper-Airway Stimulation for Obstructive Sleep Apnea.   N Engl J Med. 2014 Jan 9;370(2):139-49.  6. Amaris Y et al. Increased Incidence of Cardiovascular Disease in Middle-aged Men with Obstructive Sleep Apnea. Am J Respir Crit Care Med; 2002 166: 159-165  7. Rosa Isela ORTIZ et al. Studying Life Effects and Effectiveness of Palatopharyngoplasty (SLEEP) study: Subjective Outcomes of Isolated Uvulopalatopharyngoplasty. Otolaryngol Head Neck Surg. 2011; 144: 623-631.

## 2017-09-08 NOTE — MR AVS SNAPSHOT
"              After Visit Summary   9/8/2017    Madonna Duque    MRN: [id number]           Patient Information     Date Of Birth          1950        Visit Information        Provider Department      9/8/2017 9:00 AM Goltz, Bennett Ezra, PA-C [facility]        Today's Diagnoses     Snoring    -  1    Syncope          Care Instructions    MY TREATMENT INFORMATION FOR SLEEP APNEA-  Madonna Duque    DOCTOR : Bennett Ezra Goltz, PA-C  SLEEP CENTER : [facility]     MY CONTACT NUMBER: [phone]    Am I having a sleep study at a sleep center?  Make sure you have an appointment for the study before you leave!    Am I having a home sleep study?  Watch this video:  [email or web address]    Frequently asked questions:  1. What is Obstructive Sleep Apnea (STORM)? STORM is the most common type of sleep apnea. Apnea means, \"without breath.\"  Apnea is most often caused by narrowing or collapse of the upper airway as muscles relax during sleep.   Almost everyone has occasional apneas. Most people with sleep apnea have had brief interruptions at night frequently for many years.  The severity of sleep apnea is related to how frequent and severe the events are.   2. What are the consequences of STORM? Symptoms include: feeling sleepy during the day, snoring loudly, gasping or stopping of breathing, trouble sleeping, and occasionally morning headaches or heartburn at night.  Sleepiness can be serious and even increase the risk of falling asleep while driving. Other health consequences may include development of high blood pressure and other cardiovascular disease in persons who are susceptible. Untreated STORM  can contribute to heart disease, stroke and diabetes.   3. What are the treatment options? In most situations, sleep apnea is a lifelong disease that must be managed with daily therapy. Medications are not effective for sleep apnea and " surgery is generally not considered until other therapies have been tried. Your treatment is your choice . Continuous Positive Airway (CPAP) works right away and is the therapy that is effective in nearly everyone. An oral device to hold your jaw forward is usually the next most reliable option. Other options include postioning devices (to keep you off your back), weight loss, and surgery including a tongue pacing device. There is more detail about some of these options below.    Important tips for using CPAP and similar devices   Know your equipment:  CPAP is continuous positive airway pressure that prevents obstructive sleep apnea by keeping the throat from collapsing while you are sleeping. In most cases, the device is  smart  and can slowly self-adjusts if your throat collapses and keeps a record every day of how well you are treated-this information is available to you and your care team.  BPAP is bilevel positive airway pressure that keeps your throat open and also assists each breath with a pressure boost to maintain adequate breathing.  Special kinds of BPAP are used in patients who have inadequate breathing from lung or heart disease. In most cases, the device is  smart  and can slowly self-adjusts to assist breathing. Like CPAP, the device keeps a record of how well you are treated.  Your mask is your connection to the device. You get to choose what feels most comfortable and the staff will help to make sure if fits. Here: are some examples of the different masks that are available:       Key points to remember on your journey with sleep apnea:  1. Sleep study.  PAP devices often need to be adjusted during a sleep study to show that they are effective and adjusted right.  2. Good tips to remember: Try wearing just the mask during a quiet time during the day so your body adapts to wearing it. A humidifier is recommended for comfort in most cases to prevent drying of your nose and throat. Allergy medication  from your provider may help you if you are having nasal congestion.  3. Getting settled-in. It takes more than one night for most of us to get used to wearing a mask. Try wearing just the mask during a quiet time during the day so your body adapts to wearing it. A humidifier is recommended for comfort in most cases. Our team will work with you carefully on the first day and will be in contact within 4 days and again at 2 and 4 weeks for advice and remote device adjustments. Your therapy is evaluated by the device each day.   4. Use it every night. The more you are able to sleep naturally for 7-8 hours, the more likely you will have good sleep and to prevent health risks or symptoms from sleep apnea. Even if you use it 4 hours it helps. Occasionally all of us are unable to use a medical therapy, in sleep apnea, it is not dangerous to miss one night.   5. Communicate. Call our skilled team on the number provided on the first day if your visit for problems that make it difficult to wear the device. Over 2 out of 3 patients can learn to wear the device long-term with help from our team. Remember to call our team or your sleep providers if you are unable to wear the device as we may have other solutions for those who cannot adapt to mask CPAP therapy. It is recommended that you sleep your sleep provider within the first 3 months and yearly after that if you are not having problems.   Take care of your equipment. Make sure you clean your mask and tubing using directions every day and that your filter and mask are replaced as recommended or if they are not working.     BESIDES CPAP, WHAT OTHER THERAPIES ARE THERE?  Positioning Device  Positioning devices are generally used when sleep apnea is mild and only occurs on your back.This example shows a pillow that straps around the waist. It may be appropriate for those whose sleep study shows milder sleep apnea that occurs primarily when lying flat on one's back. Preliminary  studies have shown benefit but effectiveness at home may need to be verified by a home sleep test. These devices are generally not covered by medical insurance.    Oral Appliance  What is oral appliance therapy?  An oral appliance device fits on your teeth at night like a retainer used after having braces. The device is made by a specialized dentist and requires several visits over 1-2 months before a manufactured device is made to fit your teeth and is adjusted to prevent your sleep apnea. Once an oral device is working properly, snoring should be improved. A home sleep test may be recommended at that time if to determine whether the sleep apnea is adequately treated.       Some things to remember:  -Oral devices are often, but not always, covered by your medical insurance. Be sure to check with your insurance provider.   -If you are referred for oral therapy, you will be given a list of specialized dentists to consider or you may choose to visit the Web site of the American Academy of Dental Sleep Medicine  -Oral devices are less likely to work if you have severe sleep apnea or are extremely overweight.     More detailed information  An oral appliance is a small acrylic device that fits over the upper and lower teeth  (similar to a retainer or a mouth guard). This device slightly moves jaw forward, which moves the base of the tongue forward, opens the airway, improves breathing for effective treat snoring and obstructive sleep apnea in perhaps 7 out of 10 people .  The best working devices are custom-made by a dental device  after a mold is made of the teeth 1, 2, 3.  When is an oral appliance indicated?  Oral appliance therapy is recommended as a first-line treatment for patients with primary snoring, mild sleep apnea, and for patients with moderate sleep apnea who prefer appliance therapy to use of CPAP4, 5. Severity of sleep apnea is determined by sleep testing and is based on the number of  respiratory events per hour of sleep.   How successful is oral appliance therapy?  The success rate of oral appliance therapy in patients with mild sleep apnea is 75-80% while in patients with moderate sleep apnea it is 50-70%. The chance of success in patients with severe sleep apnea is 40-50%. The research also shows that oral appliances have a beneficial effect on the cardiovascular health of STORM patients at the same magnitude as CPAP therapy7.  Oral appliances should be a second-line treatment in cases of severe sleep apnea, but if not completely successful then a combination therapy utilizing CPAP plus oral appliance therapy may be effective. Oral appliances tend to be effective in a broad range of patients although studies show that the patients who have the highest success are females, younger patients, those with milder disease, and less severe obesity. 3, 6.   Finding a dentist that practices dental sleep medicine  Specific training is available through the American Academy of Dental Sleep Medicine for dentists interested in working in the field of sleep. To find a dentist who is educated in the field of sleep and the use of oral appliances, near you, visit the Web site of the American Academy of Dental Sleep Medicine.    References  1. Marcos et al. Objectively measured vs self-reported compliance during oral appliance therapy for sleep-disordered breathing. Chest 2013; 144(5): 7343-1538.  2. Cathy et al. Objective measurement of compliance during oral appliance therapy for sleep-disordered breathing. Thorax 2013; 68(1): 91-96.  3. Jorge, et al. Mandibular advancement devices in 620 men and women with STORM and snoring: tolerability and predictors of treatment success. Chest 2004; 125: 3934-8174.  4. Se, et al. Oral appliances for snoring and STORM: a review. Sleep 2006; 29: 244-262.  5. Kolby et al. Oral appliance treatment for STORM: an update. J Clin Sleep Med 2014; 10(2):  215-227.  6. hang Saldivar al. Predictors of OSAH treatment outcome. J Dent Res 2007; 86: 6184-9586.    Weight Loss:    Weight loss is a long-term strategy that may improve sleep apnea in some patients.    Weight management is a personal decision.  If you are interested in exploring weight loss strategies, the following discussion covers the impact on weight loss on sleep apnea and the approaches that may be successful.    Weight loss decreases severity of sleep apnea in most people with obesity. For those with mild obesity who have developed snoring with weight gain, even 15-30 pound weight loss can improve and occasionally eliminate sleep apnea.  Structured and life-long dietary and health habits are necessary to lose weight and keep healthier weight levels.     Though there may be significant health benefits from weight loss, long-term weight loss is very difficult to achieve- studies show success with dietary management in less than 10% of people. In addition, substantial weight loss may require years of dietary control and may be difficult if patients have severe obesity. In these cases, surgical management may be considered.  Finally, older individuals who have tolerated obesity without health complications may be less likely to benefit from weight loss strategies.      Your BMI is Body mass index is 24.98 kg/(m^2).  Weight management is a personal decision.  If you are interested in exploring weight loss strategies, the following discussion covers the approaches that may be successful. Body mass index (BMI) is one way to tell whether you are at a healthy weight, overweight, or obese. It measures your weight in relation to your height.  A BMI of 18.5 to 24.9 is in the healthy range. A person with a BMI of 25 to 29.9 is considered overweight, and someone with a BMI of 30 or greater is considered obese. More than two-thirds of American adults are considered overweight or obese.  Being overweight or obese increases  the risk for further weight gain. Excess weight may lead to heart disease and diabetes.  Creating and following plans for healthy eating and physical activity may help you improve your health.  Weight control is part of healthy lifestyle and includes exercise, emotional health, and healthy eating habits. Careful eating habits lifelong are the mainstay of weight control. Though there are significant health benefits from weight loss, long-term weight loss with diet alone may be very difficult to achieve- studies show long-term success with dietary management in less than 10% of people. Attaining a healthy weight may be especially difficult to achieve in those with severe obesity. In some cases, medications, devices and surgical management might be considered.  What can you do?  If you are overweight or obese and are interested in methods for weight loss, you should discuss this with your provider.     Consider reducing daily calorie intake by 500 calories.     Keep a food journal.     Avoiding skipping meals, consider cutting portions instead.    Diet combined with exercise helps maintain muscle while optimizing fat loss. Strength training is particularly important for building and maintaining muscle mass. Exercise helps reduce stress, increase energy, and improves fitness. Increasing exercise without diet control, however, may not burn enough calories to loose weight.       Start walking three days a week 10-20 minutes at a time    Work towards walking thirty minutes five days a week     Eventually, increase the speed of your walking for 1-2 minutes at time    In addition, we recommend that you review healthy lifestyles and methods for weight loss available through the National Institutes of Health patient information sites:  http://win.niddk.nih.gov/publications/index.htm    And look into health and wellness programs that may be available through your health insurance provider, employer, local community center, or  grant club.    Surgery:    Surgery for obstructive sleep apnea is considered generally only when other therapies fail to work. Surgery may be discussed with you if you are having a difficult time tolerating CPAP and or when there is an abnormal structure that requires surgical correction.  Nose and throat surgeries often enlarge the airway to prevent collapse.  Most of these surgeries create pain for 1-2 weeks and up to half of the most common surgeries are not effective throughout life.  You should carefully discuss the benefits and drawbacks to surgery with your sleep provider and surgeon to determine if it is the best solution for you.   More information  Surgery for STORM is directed at areas that are responsible for narrowing or complete obstruction of the airway during sleep.  There are a wide range of procedures available to enlarge and/or stabilize the airway to prevent blockage of breathing in the three major areas where it can occur: the palate, tongue, and nasal regions.  Successful surgical treatment depends on the accurate identification of the factors responsible for obstructive sleep apnea in each person.  A personalized approach is required because there is no single treatment that works well for everyone.  Because of anatomic variation, consultation with an examination by a sleep surgeon is a critical first step in determining what surgical options are best for each patient.  In some cases, examination during sedation may be recommended in order to guide the selection of procedures.  Patients will be counseled about risks and benefits as well as the typical recovery course after surgery. Surgery is typically not a cure for a person s STORM.  However, surgery will often significantly improve one s STORM severity (termed  success rate ).  Even in the absence of a cure, surgery will decrease the cardiovascular risk associated with OSA7; improve overall quality of life8 (sleepiness, functionality, sleep  quality, etc).  Palate Procedures:  Patients with STORM often have narrowing of their airway in the region of their tonsils and uvula.  The goals of palate procedures are to widen the airway in this region as well as to help the tissues resist collapse.  Modern palate procedure techniques focus on tissue conservation and soft tissue rearrangement, rather than tissue removal.  Often the uvula is preserved in this procedure. Residual sleep apnea is common in patient after pharyngoplasty with an average reduction in sleep apnea events of 33%2.    Tongue Procedures:  ExamWhile patients are awake, the muscles that surround the throat are active and keep this region open for breathing. These muscles relax during sleep, allowing the tongue and other structures to collapse and block breathing.  There are several different tongue procedures available.  Selection of a tongue base procedure depends on characteristics seen on physical exam.  Generally, procedures are aimed at removing bulky tissues in this area or preventing the back of the tongue from falling back during sleep.  Success rates for tongue surgery range from 50-62%3.  Hypoglossal Nerve Stimulation:  Hypoglossal nerve stimulation has recently received approval from the [place] Food and Drug Administration for the treatment of obstructive sleep apnea.  This is based on research showing that the system was safe and effective in treating sleep apnea6.  Results showed that the median AHI score decreased 68%, from 29.3 to 9.0. This therapy uses an implant system that senses breathing patterns and delivers mild stimulation to airway muscles, which keeps the airway open during sleep.  The system consists of three fully implanted components: a small generator (similar in size to a pacemaker), a breathing sensor, and a stimulation lead.  Using a small handheld remote, a patient turns the therapy on before bed and off upon awakening.    Candidates for this device must  be greater than 22 years of age, have moderate to severe STORM (AHI between 20-65), BMI less than 32, have tried CPAP/oral appliance without success, and have appropriate upper airway anatomy (determined by a sleep endoscopy performed by Dr. Quintana).  Hypoglossal Nerve Stimulation Pathway:    The sleep surgeon s office will work with the patient through the insurance prior-authorization process (including communications and appeals).    Nasal Procedures:  Nasal obstruction can interfere with nasal breathing during the day and night.  Studies have shown that relief of nasal obstruction can improve the ability of some patients to tolerate positive airway pressure therapy for obstructive sleep apnea1.  Treatment options include medications such as nasal saline, topical corticosteroid and antihistamine sprays, and oral medications such as antihistamines or decongestants. Non-surgical treatments can include external nasal dilators for selected patients. If these are not successful by themselves, surgery can improve the nasal airway either alone or in combination with these other options.  Combination Procedures:  Combination of surgical procedures and other treatments may be recommended, particularly if patients have more than one area of narrowing or persistent positional disease.  The success rate of combination surgery ranges from 66-80%2,3.    References  1. Charly ROGEL. The Role of the Nose in Snoring and Obstructive Sleep Apnoea: An Update.  Eur Arch Otorhinolaryngol. 2011; 268: 1365-73.  2.  Yris SM; Rc JA; Linnette JR; Pallanch JF; Xiomara MB; Manuel SG; Ezio GARCIA. Surgical modifications of the upper airway for obstructive sleep apnea in adults: a systematic review and meta-analysis. SLEEP 2010;33(10):5632-4383. Cami ARAIZA. Hypopharyngeal surgery in obstructive sleep apnea: an evidence-based medicine review.  Arch Otolaryngol Head Neck Surg. 2006 Feb;132(2):206-13.  3. Will YH1, Zabrina Y, Jayme IRAIS. The efficacy of  anatomically based multilevel surgery for obstructive sleep apnea. Otolaryngol Head Neck Surg. 2003 Oct;129(4):327-35.  4. Cami ARAIZA, Goldberg A. Hypopharyngeal Surgery in Obstructive Sleep Apnea: An Evidence-Based Medicine Review. Arch Otolaryngol Head Neck Surg. 2006 Feb;132(2):206-13.  5. Nathan PJ et al. Upper-Airway Stimulation for Obstructive Sleep Apnea.  N Engl J Med. 2014 Jan 9;370(2):139-49.  6. Amaris Y et al. Increased Incidence of Cardiovascular Disease in Middle-aged Men with Obstructive Sleep Apnea. Am J Respir Crit Care Med; 2002 166: 159-165  7. Natarajanmaxi ORTIZ et al. Studying Life Effects and Effectiveness of Palatopharyngoplasty (SLEEP) study: Subjective Outcomes of Isolated Uvulopalatopharyngoplasty. Otolaryngol Head Neck Surg. 2011; 144: 623-631.              Follow-ups after your visit        Your next 10 appointments already scheduled     Sep 27, 2017  3:00 PM CDT   Return Visit with AARON Puckett [facility] [address] ([facility])    [address] [place] [id number] [phone]              Future tests that were ordered for you today     Open Future Orders        Priority Expected Expires Ordered    Overnight oximetry study Routine  3/7/2018 9/8/2017            Who to contact     If you have questions or need follow up information about today's clinic visit or your schedule please contact [facility] directly at [phone].  Normal or non-critical lab and imaging results will be communicated to you by MyChart, letter or phone within 4 business days after the clinic has received the results. If you do not hear from us within 7 days, please contact the clinic through MyChart or phone. If you have a critical or abnormal lab result, we will notify you by phone as soon as possible.  Submit refill requests through People and Pages or call your pharmacy and they will forward the refill request to us.  "Please allow 3 business days for your refill to be completed.          Additional Information About Your Visit        MyChart Information     Mosso lets you send messages to your doctor, view your test results, renew your prescriptions, schedule appointments and more. To sign up, go to [email or web address] . Click on \"Log in\" on the left side of the screen, which will take you to the Welcome page. Then click on \"Sign up Now\" on the right side of the page.     You will be asked to enter the access code listed below, as well as some personal information. Please follow the directions to create your username and password.     Your access code is: [email or web address]  Expires: 2017  8:13 AM     Your access code will  in 90 days. If you need help or a new code, please call your [place] clinic or [phone].        Care EveryWhere ID     This is your Care EveryWhere ID. This could be used by other organizations to access your [place] medical records  [id number]        Your Vitals Were     Pulse Height Pulse Oximetry BMI (Body Mass Index)          63 1.645 m (5' 4.76\") 100% 24.98 kg/m2         Blood Pressure from Last 3 Encounters:   17 122/81   17 103/68   16 112/60    Weight from Last 3 Encounters:   17 67.6 kg (149 lb)   17 65.8 kg (145 lb)   16 65.8 kg (145 lb)              We Performed the Following     Sleep Study Referral        Primary Care Provider Office Phone # Fax #    Jesse Tejada [phone] [address] [facility] [address]  [place] [address]        Equal Access to Services     [facility] Center[facility] : Hadii anselmo Roldan, waasia jean, hossein kaalmachris soto, jocy polo. So [facility] [phone].    ATENCIÓN: Si habla español, tiene a miller disposición servicios gratuitos de asistencia lingüística. Llame al [phone].    We comply with applicable federal civil rights laws and [place] laws. We do not " discriminate on the basis of race, color, national origin, age, disability sex, sexual orientation or gender identity.            Thank you!     Thank you for choosing [facility]  for your care. Our goal is always to provide you with excellent care. Hearing back from our patients is one way we can continue to improve our services. Please take a few minutes to complete the written survey that you may receive in the mail after your visit with us. Thank you!             Your Updated Medication List - Protect others around you: Learn how to safely use, store and throw away your medicines at www.disposemymeds.org.          This list is accurate as of: 9/8/17  9:40 AM.  Always use your most recent med list.                   Brand Name Dispense Instructions for use Diagnosis    alirocumab 75 MG/ML injectable pen    PRALUENT    2 mL    Inject 1 mL (75 mg) Subcutaneous every 14 days    Mixed hyperlipidemia, Atherosclerosis of native coronary artery of native heart without angina pectoris, History of ST elevation myocardial infarction (STEMI)       aspirin 81 MG EC tablet     90 tablet    Take 1 tablet (81 mg) by mouth daily        carvedilol 6.25 MG tablet    COREG    186 tablet    Take 1 tablet (6.25 mg) by mouth 2 times daily (with meals)    Coronary artery disease involving native coronary artery of native heart without angina pectoris       nitroGLYcerin 0.4 MG sublingual tablet    NITROSTAT    25 tablet    Place 1 tablet (0.4 mg) under the tongue every 5 minutes as needed for chest pain If you are still having symptoms after 3 doses (15 minutes) call 911.        priLOSEC 20 MG CR capsule   Generic drug:  omeprazole      Take 20 mg by mouth daily.        STATIN NOT PRESCRIBED (INTENTIONAL)      Please choose reason not prescribed, below    ST elevation myocardial infarction involving left anterior descending (LAD) coronary artery (H)       VITAMIN D3 PO      Take 5,000 Units by mouth daily    Routine  general medical examination at a health care facility

## 2017-09-11 ENCOUNTER — OFFICE VISIT (OUTPATIENT)
Dept: SLEEP MEDICINE | Facility: CLINIC | Age: 67
End: 2017-09-11

## 2017-09-11 DIAGNOSIS — R55 SYNCOPE, UNSPECIFIED SYNCOPE TYPE: Primary | ICD-10-CM

## 2017-09-11 DIAGNOSIS — R06.83 SNORING: ICD-10-CM

## 2017-09-11 NOTE — MR AVS SNAPSHOT
"              After Visit Summary   9/11/2017    Madonna Duque    MRN: [id number]           Patient Information     Date Of Birth          1950        Visit Information        Provider Department      9/11/2017 9:00 AM BED 7 SH SLEEP [facility]        Today's Diagnoses     Syncope, unspecified syncope type    -  1    Snoring           Follow-ups after your visit        Your next 10 appointments already scheduled     Sep 27, 2017  3:00 PM CDT   Return Visit with AARON Puckett CNP   [facility] PHYSICIANS [facility] AT [place] ([facility] Clinics)    [address] [place], [place] [address]  [facility] [address] [phone]              Who to contact     If you have questions or need follow up information about today's clinic visit or your schedule please contact [facility] directly at [phone].  Normal or non-critical lab and imaging results will be communicated to you by MyChart, letter or phone within 4 business days after the clinic has received the results. If you do not hear from us within 7 days, please contact the clinic through [employer] or phone. If you have a critical or abnormal lab result, we will notify you by phone as soon as possible.  Submit refill requests through [employer] or call your pharmacy and they will forward the refill request to us. Please allow 3 business days for your refill to be completed.          Additional Information About Your Visit        MyChart Information     [employer] lets you send messages to your doctor, view your test results, renew your prescriptions, schedule appointments and more. To sign up, go to [email or web address] . Click on \"Log in\" on the left side of the screen, which will take you to the Welcome page. Then click on \"Sign up Now\" on the right side of the page.     You will be asked to enter the access code listed below, as well as some personal information. Please follow the directions to " create your username and password.     Your access code is: [email or web address]  Expires: 2017  8:13 AM     Your access code will  in 90 days. If you need help or a new code, please call your [facility] clinic or [phone].        Care EveryWhere ID     This is your Care EveryWhere ID. This could be used by other organizations to access your [place] medical records  [id number]         Blood Pressure from Last 3 Encounters:   17 122/81   17 103/68   16 112/60    Weight from Last 3 Encounters:   17 67.6 kg (149 lb)   17 65.8 kg (145 lb)   16 65.8 kg (145 lb)              We Performed the Following     Overnight oximetry study        Primary Care Provider Office Phone # Fax #    Jesse Eduardo Tejada [phone] [address] [facility] [address]  [facility] [address]        Equal Access to Services     [facility]IE [facility] Center[facility] : Hadii aad ku hadasho Soomaali, waaxda luqadaha, qaybta kaalmada adeegyada, waxay idiin hayolivian sohail osorio . So [facility] [phone].    ATENCIÓN: Si habla español, tiene a miller disposición servicios gratuitos de asistencia lingüística. Llame al [phone].    We comply with applicable federal civil rights laws and [place] laws. We do not discriminate on the basis of race, color, national origin, age, disability sex, sexual orientation or gender identity.            Thank you!     Thank you for choosing [place] SLEEP [facility]  for your care. Our goal is always to provide you with excellent care. Hearing back from our patients is one way we can continue to improve our services. Please take a few minutes to complete the written survey that you may receive in the mail after your visit with us. Thank you!             Your Updated Medication List - Protect others around you: Learn how to safely use, store and throw away your medicines at www.disposemymeds.org.          This list is accurate as of: 17 11:59 PM.  Always use your most recent  med list.                   Brand Name Dispense Instructions for use Diagnosis    alirocumab 75 MG/ML injectable pen    PRALUENT    2 mL    Inject 1 mL (75 mg) Subcutaneous every 14 days    Mixed hyperlipidemia, Atherosclerosis of native coronary artery of native heart without angina pectoris, History of ST elevation myocardial infarction (STEMI)       aspirin 81 MG EC tablet     90 tablet    Take 1 tablet (81 mg) by mouth daily        carvedilol 6.25 MG tablet    COREG    186 tablet    Take 1 tablet (6.25 mg) by mouth 2 times daily (with meals)    Coronary artery disease involving native coronary artery of native heart without angina pectoris       nitroGLYcerin 0.4 MG sublingual tablet    NITROSTAT    25 tablet    Place 1 tablet (0.4 mg) under the tongue every 5 minutes as needed for chest pain If you are still having symptoms after 3 doses (15 minutes) call 911.        priLOSEC 20 MG CR capsule   Generic drug:  omeprazole      Take 20 mg by mouth daily.        STATIN NOT PRESCRIBED (INTENTIONAL)      Please choose reason not prescribed, below    ST elevation myocardial infarction involving left anterior descending (LAD) coronary artery (H)       VITAMIN D3 PO      Take 5,000 Units by mouth daily    Routine general medical examination at a health care facility

## 2017-09-12 NOTE — PROGRESS NOTES
Patient instructed on ROGELIO use. Patient demonstrated and verbalized knowledge of use. Insurance was verified by financial securing. Device programmed. Device will be returned tomorrow before noon.      Sally MoranRios  Sleep Clinic - Specialist

## 2017-09-19 ENCOUNTER — TELEPHONE (OUTPATIENT)
Dept: SLEEP MEDICINE | Facility: CLINIC | Age: 67
End: 2017-09-19

## 2017-09-19 DIAGNOSIS — I49.01 VENTRICULAR FIBRILLATION (H): ICD-10-CM

## 2017-09-19 DIAGNOSIS — R79.81 ABNORMAL PULSE OXIMETRY: Primary | ICD-10-CM

## 2017-09-19 DIAGNOSIS — R06.83 SNORING: ICD-10-CM

## 2017-09-19 NOTE — TELEPHONE ENCOUNTER
I reviewed the oximetry results and recommended an in lab PSG given that her overall TAN was in the mild range. While this suggests mild STORM, I do recommend pursuing further evaluation given her history of MI and arrhythmia. She was willing to do the PSG. I am placing orders for a split night PSG. She will follow up 2 weeks after the study.  Bennett Goltz, PA-C

## 2017-09-19 NOTE — TELEPHONE ENCOUNTER
Oximetry results were obtained for the date of 9/11/2017.  The patient was on room air to screen for STORM.  The study showed a valid recording time of 8:57 with a 4% desaturation index of 9.1/hr.  The mean oxygen saturation was 92.4% and the lowest SpO2 was 82%.  The patient spent 5.1 minutes below 89% SpO2.     These results suggest mild REM related apnea with borderline hypoxemia.  I left a message asking for a return call.  Bennett Goltz, PA-C

## 2017-09-27 ENCOUNTER — OFFICE VISIT (OUTPATIENT)
Dept: CARDIOLOGY | Facility: CLINIC | Age: 67
End: 2017-09-27
Attending: INTERNAL MEDICINE
Payer: COMMERCIAL

## 2017-09-27 VITALS
BODY MASS INDEX: 25.16 KG/M2 | WEIGHT: 151 LBS | DIASTOLIC BLOOD PRESSURE: 62 MMHG | SYSTOLIC BLOOD PRESSURE: 110 MMHG | HEIGHT: 65 IN | HEART RATE: 58 BPM

## 2017-09-27 DIAGNOSIS — I25.10 CORONARY ARTERY DISEASE INVOLVING NATIVE CORONARY ARTERY OF NATIVE HEART WITHOUT ANGINA PECTORIS: ICD-10-CM

## 2017-09-27 DIAGNOSIS — E78.2 MIXED HYPERLIPIDEMIA: Primary | ICD-10-CM

## 2017-09-27 DIAGNOSIS — R55 VASOVAGAL SYNCOPE: ICD-10-CM

## 2017-09-27 PROCEDURE — 99214 OFFICE O/P EST MOD 30 MIN: CPT | Performed by: NURSE PRACTITIONER

## 2017-09-27 RX ORDER — CARVEDILOL 3.12 MG/1
3.12 TABLET ORAL 2 TIMES DAILY WITH MEALS
COMMUNITY
End: 2018-12-14

## 2017-09-27 NOTE — MR AVS SNAPSHOT
"              After Visit Summary   9/27/2017    Madonna Duque    MRN: [id number]           Patient Information     Date Of Birth          1950        Visit Information        Provider Department      9/27/2017 3:00 PM Monika Enriquez APRN CNP [facility] HEART AT [place]        Today's Diagnoses     Mixed hyperlipidemia    -  1    Vasovagal syncope        Coronary artery disease involving native coronary artery of native heart without angina pectoris           Follow-ups after your visit        Your next 10 appointments already scheduled     Dec 14, 2017  3:45 PM CST   Return Visit with Radha Bennett MD   [facility] ([facility] Clin[facility])    [address] [place] [address] [phone]              Who to contact     If you have questions or need follow up information about today's clinic visit or your schedule please contact [facility] directly at [phone].  Normal or non-critical lab and imaging results will be communicated to you by Pet[employer], letter or phone within 4 business days after the clinic has received the results. If you do not hear from us within 7 days, please contact the clinic through [employer] or phone. If you have a critical or abnormal lab result, we will notify you by phone as soon as possible.  Submit refill requests through [employer] or call your pharmacy and they will forward the refill request to us. Please allow 3 business days for your refill to be completed.          Additional Information About Your Visit        Pet[employer]hart Information     [employer] lets you send messages to your doctor, view your test results, renew your prescriptions, schedule appointments and more. To sign up, go to [email or web address] . Click on \"Log in\" on the left side of the screen, which will take you to the Welcome page. Then click on \"Sign up Now\" on the " "right side of the page.     You will be asked to enter the access code listed below, as well as some personal information. Please follow the directions to create your username and password.     Your access code is: [email or web address]  Expires: 2017  8:13 AM     Your access code will  in 90 days. If you need help or a new code, please call your [facility] clinic or [phone].        Care EveryWhere ID     This is your Care EveryWhere ID. This could be used by other organizations to access your [facility] medical records  [id number]        Your Vitals Were     Pulse Height BMI (Body Mass Index)             58 1.645 m (5' 4.76\") 25.31 kg/m2          Blood Pressure from Last 3 Encounters:   17 110/62   17 122/81   17 103/68    Weight from Last 3 Encounters:   17 68.5 kg (151 lb)   17 67.6 kg (149 lb)   17 65.8 kg (145 lb)              We Performed the Following     Follow-Up with Cardiac Advanced Practice Provider          Today's Medication Changes          These changes are accurate as of: 17  3:39 PM.  If you have any questions, ask your nurse or doctor.               These medicines have changed or have updated prescriptions.        Dose/Directions    carvedilol 3.125 MG tablet   Commonly known as:  COREG   This may have changed:  Another medication with the same name was removed. Continue taking this medication, and follow the directions you see here.   Changed by:  Monika Enriquez APRN CNP        Dose:  3.125 mg   Take 3.125 mg by mouth 2 times daily (with meals)   Refills:  0                Primary Care Provider Office Phone # Fax #    Jesse Tejada [phone] [address] [place] [address] [place] [address]        Equal Access to Services     [facility] AH: Steff shi Sospencer, waaxda luqadaha, qaybta kaalmada sohailyachris, jocy polo. So [facility] [phone].    ATENCIÓN: Si vinny cosme, " tiene a miller disposición servicios gratuitos de asistencia lingüística. Bridger vences [phone].    We comply with applicable federal civil rights laws and [place] laws. We do not discriminate on the basis of race, color, national origin, age, disability sex, sexual orientation or gender identity.            Thank you!     Thank you for choosing [facility] PHYSICIANS HEART AT [place]  for your care. Our goal is always to provide you with excellent care. Hearing back from our patients is one way we can continue to improve our services. Please take a few minutes to complete the written survey that you may receive in the mail after your visit with us. Thank you!             Your Updated Medication List - Protect others around you: Learn how to safely use, store and throw away your medicines at www.disposemymeds.org.          This list is accurate as of: 9/27/17  3:39 PM.  Always use your most recent med list.                   Brand Name Dispense Instructions for use Diagnosis    alirocumab 75 MG/ML injectable pen    PRALUENT    2 mL    Inject 1 mL (75 mg) Subcutaneous every 14 days    Mixed hyperlipidemia, Atherosclerosis of native coronary artery of native heart without angina pectoris, History of ST elevation myocardial infarction (STEMI)       aspirin 81 MG EC tablet     90 tablet    Take 1 tablet (81 mg) by mouth daily        carvedilol 3.125 MG tablet    COREG     Take 3.125 mg by mouth 2 times daily (with meals)        nitroGLYcerin 0.4 MG sublingual tablet    NITROSTAT    25 tablet    Place 1 tablet (0.4 mg) under the tongue every 5 minutes as needed for chest pain If you are still having symptoms after 3 doses (15 minutes) call 911.        priLOSEC 20 MG CR capsule   Generic drug:  omeprazole      Take 20 mg by mouth daily.        STATIN NOT PRESCRIBED (INTENTIONAL)      Please choose reason not prescribed, below    ST elevation myocardial infarction involving left anterior descending (LAD)  coronary artery (H)       VITAMIN D3 PO      Take 5,000 Units by mouth daily    Routine general medical examination at a health care facility

## 2017-09-27 NOTE — PROGRESS NOTES
HPI and Plan:   I had the pleasure of meeting Madonna Duque in cardiology clinic today to review the results of her Holter monitor and sleep studies.  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 Bennett for her history of coronary artery disease.  More recently she called her office describing a syncopal episode that occurred three weeks prior when she awoke feeling ill in the middle of the night with nausea and diarrhea.  She went to the bathroom and sat on the toilet, she awoke with her head against the sink.  She has not had any recurrent syncope or presyncope since that time.  She has no chest pain, shortness of breath, PND or orthopnea.  Dr. Bennett, her carvedilol and half, recommended a CardioNet and sleep study.      She has a cardiac history of V. fib arrest from which he was defibrillated and ST elevation MI while she was on a cruise in [place] in December 2015.  She underwent urgent catheterization with two drug-eluting stents in the mid and proximal LAD where she had a 90% ulcerative plaque.  She had mild to moderate disease in the right coronary artery, she had no disease noted in the circumflex and short left main.  Her STEMI occurred in the setting of hypokalemia and dehydration.  She has a history of significant statin intolerances.  She is currently taking Praluent 75 mg per Esvin injectable pen every 14 days.  She has had success in reducing her LDL to 81 at last check.  She has some skin irritation for three or four days following the injection and does not think she could go up to a higher dose.    Her Holter monitor which she worked for 24 hours demonstrated heart rates ranging from  bpm, the average was 66.  She was in sinus rhythm.  She had no ventricular ectopy.  She had 46 isolated SBP disease there were no pauses.  Overnight oximetry suggested she may benefit from a sleep study that she may have mild STORM.  She's been awaiting their call back to schedule a sleep study  formally.    Labs.  Total cholesterol 173, HDL 55, LDL 81, triglyceride 184, potassium 4.7, creatinine 0.66    Physical exam  General.  66-year-old woman, appears healthy in no acute distress  Vital signs.  Blood pressure 110/62, heart rate 58, weight 151 pounds, BMI 25  Lungs.  Bilaterally clear to auscultation, no murmur  Neck.  No carotid bruit  Cardiac.  Rate regular, no murmur  Extremities.  No lower extremity edema    Chest implant.  1. Syncopal episode.  This sounds most likely vasovagal in nature.  Her Holter monitor while she was on carvedilol 3.125 mg twice daily demonstrated no pauses and no arrhythmias noted was essentially normal.  I continued her on this dose of perianal  2. Obstructive sleep apnea.  Recommend she follow up with a sleep study and treat sleep apnea if that's what she is diagnosed with.  3. Hyperlipidemia.  She seems to be doing well on the Praluent with minimal side effects other than skin irritation.  Her LDL is significantly improved.  4. Coronary artery disease with a history of V. fib arrest and STEMI and subsequent LAD stenting.  She should continue on aspirin and statin therapy indefinitely as well as beta-blockade.    Take me to see Madonna today.  She should schedule follow-up with Dr. Bennett in December    Agata FranciscoHarrison County Hospitalsaw WALKER CNP        No orders of the defined types were placed in this encounter.      Orders Placed This Encounter   Medications     carvedilol (COREG) 3.125 MG tablet     Sig: Take 3.125 mg by mouth 2 times daily (with meals)       Medications Discontinued During This Encounter   Medication Reason     carvedilol (COREG) 6.25 MG tablet          Encounter Diagnosis   Name Primary?     Syncope        CURRENT MEDICATIONS:  Current Outpatient Prescriptions   Medication Sig Dispense Refill     carvedilol (COREG) 3.125 MG tablet Take 3.125 mg by mouth 2 times daily (with meals)       STATIN NOT PRESCRIBED, INTENTIONAL, Please choose reason not prescribed, below        alirocumab (PRALUENT) 75 MG/ML injectable pen Inject 1 mL (75 mg) Subcutaneous every 14 days 2 mL 11     nitroglycerin (NITROSTAT) 0.4 MG SL tablet Place 1 tablet (0.4 mg) under the tongue every 5 minutes as needed for chest pain If you are still having symptoms after 3 doses (15 minutes) call 911. 25 tablet 0     aspirin 81 MG EC tablet Take 1 tablet (81 mg) by mouth daily 90 tablet 3     Cholecalciferol (VITAMIN D3 PO) Take 5,000 Units by mouth daily       omeprazole (PRILOSEC) 20 MG capsule Take 20 mg by mouth daily.         ALLERGIES     Allergies   Allergen Reactions     Crestor [Rosuvastatin]      Muscle aches, debilitating     Codeine Nausea and Vomiting     Shrimp Nausea and Vomiting       PAST MEDICAL HISTORY:  Past Medical History:   Diagnosis Date     Abdominal discomfort 12/13    ct abd and pelvis no cause found     Chest pain 2/14    neg est echo     Colonic polyp 2005    fu 2011 nl and to fu 2021     Coronary artery disease involving native coronary artery of native heart without angina pectoris      Dizzy 2009    mri brain nl     DJD (degenerative joint disease)     neck     Elevated blood sugar      GERD (gastroesophageal reflux disease) 2007    egd, benign stricture     Hypercholesteremia 2010    crestor intolerant, lipitor intolerant     CORINA (iron deficiency anaemia) 2008    ugi and sbft nl, be nl     Left ventricular diastolic dysfunction 1/16    echo done for fu and nl ef, no wma     Osteopenia     fu dexa 2011 -1.9 fem neck and -1.2 spine, stable c/w 2007     PONV (postoperative nausea and vomiting)      STEMI (ST elevation myocardial infarction) (H) 12/15    in [place] on cruise, lytics then ptca of lad, 2 stents, had vfib arrest, ef on angio 67%, 40% rca, no other dz     Syncope     episode August 2017, possible vasovagal     Urethral strictures      Ventricular fibrillation (H) 12/15    during mi     Vitamin D deficiency        PAST SURGICAL HISTORY:  Past Surgical History:   Procedure  Laterality Date     C LIGATE FALLOPIAN TUBE,POSTPARTUM  84    Tubal Ligation     HC REMOVAL OF OVARY/TUBE(S)  80    Salpingo-Oophorectomy, Unilateral(endometriosis)     LAPAROTOMY EXPLORATORY  79    due to endomet, removed ovary     REPAIR PTOSIS BILATERAL  2/7/2012    Procedure:REPAIR PTOSIS BILATERAL; BILATERAL UPPER LID MECHANICAL PTOSIS REPAIR,and ptosis repair; Surgeon:JOVANA BETANCUR; [facility]       FAMILY HISTORY:  Family History   Problem Relation Age of Onset     DIABETES Father      CANCER Father      [place]     CANCER Mother      CLL     DIABETES Paternal Aunt      DIABETES Paternal Aunt      DIABETES Paternal Aunt      DIABETES Paternal Uncle      DIABETES Paternal Uncle      DIABETES Paternal Uncle      Breast Cancer Paternal Aunt      Cancer - colorectal Paternal Aunt      Cancer - colorectal Paternal Aunt      CANCER Paternal Uncle      bladder       SOCIAL HISTORY:  Social History     Social History     Marital status:      Spouse name: N/A     Number of children: 1     Years of education: N/A     Occupational History     rn, retired 2015 [employer],[address]     Social History Main Topics     Smoking status: Former Smoker     Quit date: 1/28/1982     Smokeless tobacco: Never Used      Comment: quit 23 years ago     Alcohol use No     Drug use: No     Sexual activity: Yes     Partners: Male     Other Topics Concern     Special Diet No     heart healthy, weight watches      Exercise No     Social History Narrative       Review of Systems:  Skin:  Negative       Eyes:  Positive for glasses    ENT:  Negative      Respiratory:  Negative       Cardiovascular:    Positive for;lightheadedness upon standing (chronic r/t low BP per pt)   Gastroenterology: Positive for heartburn;reflux    Genitourinary:  Negative      Musculoskeletal:  Positive for   L hip spurs  Neurologic:  Negative      Psychiatric:  Positive for   worked NOC shift for years, occ issues sleeping  "  Heme/Lymph/Imm:  Positive for allergies    Endocrine:  Negative        Physical Exam:  Vitals: /62  Pulse 58  Ht 1.645 m (5' 4.76\")  Wt 68.5 kg (151 lb)  BMI 25.31 kg/m2    Constitutional:  cooperative;alert and oriented;in no acute distress        Skin:  warm and dry to the touch        Head:  normocephalic        Eyes:  pupils equal and round        ENT:  dentition good        Neck:  JVP normal;no carotid bruit        Chest:  normal breath sounds, clear to auscultation, normal A-P diameter, normal symmetry, normal respiratory excursion, no use of accessory muscles          Cardiac: regular rhythm;normal S1 and S2                  Abdomen:           Vascular: pulses full and equal                                        Extremities and Back:  no edema              Neurological:  affect appropriate, oriented to time, person and place              CC  Radha Bennett MD  [address]  [place] [id number]              "

## 2017-09-27 NOTE — LETTER
9/27/2017    Jesse Tejada MD  [address]  [facility] [address]    RE: Madonna Duque       Dear Colleague,    I had the pleasure of seeing Madonna Duque in the [facility].    HPI and Plan:   I had the pleasure of meeting Madonna Duque in cardiology clinic today to review the results of her Holter monitor and sleep studies.  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 Bennett for her history of coronary artery disease.  More recently she called her office describing a syncopal episode that occurred three weeks prior when she awoke feeling ill in the middle of the night with nausea and diarrhea.  She went to the bathroom and sat on the toilet, she awoke with her head against the sink.  She has not had any recurrent syncope or presyncope since that time.  She has no chest pain, shortness of breath, PND or orthopnea.  Dr. Bennett, her carvedilol and half, recommended a CardioNet and sleep study.      She has a cardiac history of V. fib arrest from which he was defibrillated and ST elevation MI while she was on a cruise in [place] in December 2015.  She underwent urgent catheterization with two drug-eluting stents in the mid and proximal LAD where she had a 90% ulcerative plaque.  She had mild to moderate disease in the right coronary artery, she had no disease noted in the circumflex and short left main.  Her STEMI occurred in the setting of hypokalemia and dehydration.  She has a history of significant statin intolerances.  She is currently taking Praluent 75 mg per Esvin injectable pen every 14 days.  She has had success in reducing her LDL to 81 at last check.  She has some skin irritation for three or four days following the injection and does not think she could go up to a higher dose.    Her Holter monitor which she worked for 24 hours demonstrated heart rates ranging from  bpm, the average was 66.  She was in sinus rhythm.  She had no  ventricular ectopy.  She had 46 isolated SBP disease there were no pauses.  Overnight oximetry suggested she may benefit from a sleep study that she may have mild STORM.  She's been awaiting their call back to schedule a sleep study formally.    Labs.  Total cholesterol 173, HDL 55, LDL 81, triglyceride 184, potassium 4.7, creatinine 0.66    Physical exam  General.  66-year-old woman, appears healthy in no acute distress  Vital signs.  Blood pressure 110/62, heart rate 58, weight 151 pounds, BMI 25  Lungs.  Bilaterally clear to auscultation, no murmur  Neck.  No carotid bruit  Cardiac.  Rate regular, no murmur  Extremities.  No lower extremity edema    Chest implant.  1. Syncopal episode.  This sounds most likely vasovagal in nature.  Her Holter monitor while she was on carvedilol 3.125 mg twice daily demonstrated no pauses and no arrhythmias noted was essentially normal.  I continued her on this dose of perianal  2. Obstructive sleep apnea.  Recommend she follow up with a sleep study and treat sleep apnea if that's what she is diagnosed with.  3. Hyperlipidemia.  She seems to be doing well on the Praluent with minimal side effects other than skin irritation.  Her LDL is significantly improved.  4. Coronary artery disease with a history of V. fib arrest and STEMI and subsequent LAD stenting.  She should continue on aspirin and statin therapy indefinitely as well as beta-blockade.    Take me to see Madonna today.  She should schedule follow-up with Dr. Bennett in December    Ag[facility] FranciscoO[facility] WALKER CNP        No orders of the defined types were placed in this encounter.      Orders Placed This Encounter   Medications     carvedilol (COREG) 3.125 MG tablet     Sig: Take 3.125 mg by mouth 2 times daily (with meals)       Medications Discontinued During This Encounter   Medication Reason     carvedilol (COREG) 6.25 MG tablet          Encounter Diagnosis   Name Primary?     Syncope        CURRENT MEDICATIONS:  Current Outpatient  Prescriptions   Medication Sig Dispense Refill     carvedilol (COREG) 3.125 MG tablet Take 3.125 mg by mouth 2 times daily (with meals)       STATIN NOT PRESCRIBED, INTENTIONAL, Please choose reason not prescribed, below       alirocumab (PRALUENT) 75 MG/ML injectable pen Inject 1 mL (75 mg) Subcutaneous every 14 days 2 mL 11     nitroglycerin (NITROSTAT) 0.4 MG SL tablet Place 1 tablet (0.4 mg) under the tongue every 5 minutes as needed for chest pain If you are still having symptoms after 3 doses (15 minutes) call 911. 25 tablet 0     aspirin 81 MG EC tablet Take 1 tablet (81 mg) by mouth daily 90 tablet 3     Cholecalciferol (VITAMIN D3 PO) Take 5,000 Units by mouth daily       omeprazole (PRILOSEC) 20 MG capsule Take 20 mg by mouth daily.         ALLERGIES     Allergies   Allergen Reactions     Crestor [Rosuvastatin]      Muscle aches, debilitating     Codeine Nausea and Vomiting     Shrimp Nausea and Vomiting       PAST MEDICAL HISTORY:  Past Medical History:   Diagnosis Date     Abdominal discomfort 12/13    ct abd and pelvis no cause found     Chest pain 2/14    neg est echo     Colonic polyp 2005    fu 2011 nl and to fu 2021     Coronary artery disease involving native coronary artery of native heart without angina pectoris      Dizzy 2009    mri brain nl     DJD (degenerative joint disease)     neck     Elevated blood sugar      GERD (gastroesophageal reflux disease) 2007    egd, benign stricture     Hypercholesteremia 2010    crestor intolerant, lipitor intolerant     CORINA (iron deficiency anaemia) 2008    ugi and sbft nl, be nl     Left ventricular diastolic dysfunction 1/16    echo done for fu and nl ef, no wma     Osteopenia     fu dexa 2011 -1.9 fem neck and -1.2 spine, stable c/w 2007     PONV (postoperative nausea and vomiting)      STEMI (ST elevation myocardial infarction) (H) 12/15    in [place] on cruise, lytics then ptca of lad, 2 stents, had vfib arrest, ef on angio 67%, 40% rca, no other dz      Syncope     episode August 2017, possible vasovagal     Urethral strictures      Ventricular fibrillation (H) 12/15    during mi     Vitamin D deficiency        PAST SURGICAL HISTORY:  Past Surgical History:   Procedure Laterality Date     C LIGATE FALLOPIAN TUBE,POSTPARTUM  85    Tubal Ligation     HC REMOVAL OF OVARY/TUBE(S)  81    Salpingo-Oophorectomy, Unilateral(endometriosis)     LAPAROTOMY EXPLORATORY  80    due to endomet, removed ovary     REPAIR PTOSIS BILATERAL  2/7/2012    Procedure:REPAIR PTOSIS BILATERAL; BILATERAL UPPER LID MECHANICAL PTOSIS REPAIR,and ptosis repair; Surgeon:JOVANA BETANCUR; Location:[facility]       FAMILY HISTORY:  Family History   Problem Relation Age of Onset     DIABETES Father      CANCER Father      [place]     CANCER Mother      CLL     DIABETES Paternal Aunt      DIABETES Paternal Aunt      DIABETES Paternal Aunt      DIABETES Paternal Uncle      DIABETES Paternal Uncle      DIABETES Paternal Uncle      Breast Cancer Paternal Aunt      Cancer - colorectal Paternal Aunt      Cancer - colorectal Paternal Aunt      CANCER Paternal Uncle      bladder       SOCIAL HISTORY:  Social History     Social History     Marital status:      Spouse name: N/A     Number of children: 1     Years of education: N/A     Occupational History     rn, retired 2015 [employer] [address]     Social History Main Topics     Smoking status: Former Smoker     Quit date: 1/28/1982     Smokeless tobacco: Never Used      Comment: quit 23 years ago     Alcohol use No     Drug use: No     Sexual activity: Yes     Partners: Male     Other Topics Concern     Special Diet No     heart healthy, weight watches      Exercise No     Social History Narrative       Review of Systems:  Skin:  Negative       Eyes:  Positive for glasses    ENT:  Negative      Respiratory:  Negative       Cardiovascular:    Positive for;lightheadedness upon standing (chronic r/t low BP per pt)  "  Gastroenterology: Positive for heartburn;reflux    Genitourinary:  Negative      Musculoskeletal:  Positive for   L hip spurs  Neurologic:  Negative      Psychiatric:  Positive for   worked NOC shift for years, occ issues sleeping   Heme/Lymph/Imm:  Positive for allergies    Endocrine:  Negative        Physical Exam:  Vitals: /62  Pulse 58  Ht 1.645 m (5' 4.76\")  Wt 68.5 kg (151 lb)  BMI 25.31 kg/m2    Constitutional:  cooperative;alert and oriented;in no acute distress        Skin:  warm and dry to the touch        Head:  normocephalic        Eyes:  pupils equal and round        ENT:  dentition good        Neck:  JVP normal;no carotid bruit        Chest:  normal breath sounds, clear to auscultation, normal A-P diameter, normal symmetry, normal respiratory excursion, no use of accessory muscles          Cardiac: regular rhythm;normal S1 and S2                  Abdomen:           Vascular: pulses full and equal                                        Extremities and Back:  no edema              Neurological:  affect appropriate, oriented to time, person and place        Thank you for allowing me to participate in the care of your patient.    Sincerely,     AARON Escobar [facility]    "

## 2017-10-10 ENCOUNTER — TELEPHONE (OUTPATIENT)
Dept: CARDIOLOGY | Facility: CLINIC | Age: 67
End: 2017-10-10

## 2017-10-23 ENCOUNTER — CARE COORDINATION (OUTPATIENT)
Dept: CARDIOLOGY | Facility: CLINIC | Age: 67
End: 2017-10-23

## 2017-11-06 ENCOUNTER — THERAPY VISIT (OUTPATIENT)
Dept: SLEEP MEDICINE | Facility: CLINIC | Age: 67
End: 2017-11-06
Payer: COMMERCIAL

## 2017-11-06 DIAGNOSIS — G47.33 OSA (OBSTRUCTIVE SLEEP APNEA): ICD-10-CM

## 2017-11-06 DIAGNOSIS — R06.83 SNORING: ICD-10-CM

## 2017-11-06 DIAGNOSIS — R79.81 ABNORMAL PULSE OXIMETRY: ICD-10-CM

## 2017-11-06 DIAGNOSIS — I49.01 VENTRICULAR FIBRILLATION (H): ICD-10-CM

## 2017-11-06 PROCEDURE — 95810 POLYSOM 6/> YRS 4/> PARAM: CPT | Performed by: INTERNAL MEDICINE

## 2017-11-06 NOTE — MR AVS SNAPSHOT
"              After Visit Summary   11/6/2017    Madonna Duque    MRN: [id number]           Patient Information     Date Of Birth          1950        Visit Information        Provider Department      11/6/2017 8:30 PM BED 5 SH SLEEP [facility]        Today's Diagnoses     Snoring        Abnormal pulse oximetry        Ventricular fibrillation (H)           Follow-ups after your visit        Your next 10 appointments already scheduled     Nov 17, 2017  1:30 PM CST   Return Sleep Patient with Bennett Ezra Goltz, PA-C   [facility] ([facility])    [address]  [facility] [id number] [phone]            Dec 14, 2017  3:45 PM CST   Return Visit with Radha Bennett MD   [facility] ([facility])    [address]  [facility] [address] [phone]              Who to contact     If you have questions or need follow up information about today's clinic visit or your schedule please contact [facility] directly at [phone].  Normal or non-critical lab and imaging results will be communicated to you by [employer], letter or phone within 4 business days after the clinic has received the results. If you do not hear from us within 7 days, please contact the clinic through [employer] or phone. If you have a critical or abnormal lab result, we will notify you by phone as soon as possible.  Submit refill requests through [employer] or call your pharmacy and they will forward the refill request to us. Please allow 3 business days for your refill to be completed.          Additional Information About Your Visit        [employer] Information     [employer] lets you send messages to your doctor, view your test results, renew your prescriptions, schedule appointments and more. To sign up, go to [email or web address] . Click on \"Log in\" on the left side of the screen, " "which will take you to the Welcome page. Then click on \"Sign up Now\" on the right side of the page.     You will be asked to enter the access code listed below, as well as some personal information. Please follow the directions to create your username and password.     Your access code is: [email or web address]  Expires: 2017  7:13 AM     Your access code will  in 90 days. If you need help or a new code, please call your [facility] clinic or [phone].        Care EveryWhere ID     This is your Care EveryWhere ID. This could be used by other organizations to access your [facility] medical records  [id number]         Blood Pressure from Last 3 Encounters:   17 110/62   17 122/81   17 103/68    Weight from Last 3 Encounters:   17 68.5 kg (151 lb)   17 67.6 kg (149 lb)   17 65.8 kg (145 lb)              We Performed the Following     Comprehensive Sleep Study        Primary Care Provider Office Phone # Fax #    Jesse Tejada [phone] [address]  [place] [address]        Equal Access to Services     JOANIE [facility] HospitalREGINE : Hadii aad ku hadasho Somoisésali, waaxda luqadaha, qaybta kaalmada adeegyada, jocy mckeonn sohail osorio . So [facility] [phone].    ATENCIÓN: Si habla español, tiene a miller disposición servicios gratuitos de asistencia lingüística. Llame al [phone].    We comply with applicable federal civil rights laws and [place] laws. We do not discriminate on the basis of race, color, national origin, age, disability, sex, sexual orientation, or gender identity.            Thank you!     Thank you for choosing [facility]  for your care. Our goal is always to provide you with excellent care. Hearing back from our patients is one way we can continue to improve our services. Please take a few minutes to complete the written survey that you may receive in the mail after your visit with us. Thank you!           "   Your Updated Medication List - Protect others around you: Learn how to safely use, store and throw away your medicines at www.disposemymeds.org.          This list is accurate as of: 11/6/17 11:59 PM.  Always use your most recent med list.                   Brand Name Dispense Instructions for use Diagnosis    alirocumab 75 MG/ML injectable pen    PRALUENT    2 mL    Inject 1 mL (75 mg) Subcutaneous every 14 days    Mixed hyperlipidemia, Atherosclerosis of native coronary artery of native heart without angina pectoris, History of ST elevation myocardial infarction (STEMI)       aspirin 81 MG EC tablet     90 tablet    Take 1 tablet (81 mg) by mouth daily        carvedilol 3.125 MG tablet    COREG     Take 3.125 mg by mouth 2 times daily (with meals)        nitroGLYcerin 0.4 MG sublingual tablet    NITROSTAT    25 tablet    Place 1 tablet (0.4 mg) under the tongue every 5 minutes as needed for chest pain If you are still having symptoms after 3 doses (15 minutes) call 911.        priLOSEC 20 MG CR capsule   Generic drug:  omeprazole      Take 20 mg by mouth daily.        STATIN NOT PRESCRIBED (INTENTIONAL)      Please choose reason not prescribed, below    ST elevation myocardial infarction involving left anterior descending (LAD) coronary artery (H)       VITAMIN D3 PO      Take 5,000 Units by mouth daily    Routine general medical examination at a health care facility

## 2017-11-10 PROBLEM — G47.33 OSA (OBSTRUCTIVE SLEEP APNEA): Status: ACTIVE | Noted: 2017-11-10

## 2017-11-10 NOTE — PROCEDURES
" SLEEP STUDY INTERPRETATION  POLYSOMNOGRAPHY REPORT      Patient: Madonna Duque  YOB: 1950  Study Date: 11/6/2017  MRN: [id number]  Referring Provider: MD Bennett Candace  Ordering Provider: Goltz, PA-C, Bennett    Indications for Polysomnography: The patient is a 67 y old Female who is 5' 5\" and weighs 149.0 lbs.  Her BMI is 24.8, [place] sleepiness scale 5.0 and neck size is 35.0.  Relevant medical history includes DJD, ventricular fibrillation and STEMI in 2015, GERD. A diagnostic polysomnogram was performed to evaluate for STORM.    Polysomnogram Data:  A full night polysomnogram recorded the standard physiologic parameters including EEG, EOG, EMG, ECG, nasal and oral airflow.  Respiratory parameters of chest and abdominal movements were recorded with respiratory inductance plethysmography.  Oxygen saturation was recorded by pulse oximetry.      Sleep Architecture: Normal sleep latency without sleep aid, normal arousal index, and normal sleep efficiency.  The total recording time of the polysomnogram was 475.1 minutes.  The total sleep time was 451.0 minutes.  Sleep latency was normal at 4.4 minutes without the use of a sleep aid.  REM latency was 60.5 minutes.  Arousal index was normal at 15.8 arousals per hour.  Sleep efficiency was normal at 94.9%.  Wake after sleep onset was 19.0 minutes.  The patient spent 6.8% of total sleep time in Stage N1, 56.3% in Stage N2, 14.2% in Stages N3, and 22.7% in REM.  Time in REM supine was 39.5 minutes.    Respiration: Mild obstructive sleep apnea, worse supine and during REM, with > 50% of events during REM supine sleep.    Events - The polysomnogram revealed a presence of 56 obstructive, 14 central, and - mixed apneas resulting in an apnea index of 9.3 events per hour.  There were 9 hypopneas resulting in a hypopnea index of 1.2 events per hour.  The combined apnea/hypopnea index was 10.5 events per hour.  The REM AHI was 24.6 events per hour.  The supine " AHI was 20.8 events per hour.  The RERA index was 5.3 events per hour.   The RDI was 15.8 events per hour.    Snoring - was reported as mild.    Respiratory rate and pattern - was normal.    Sustained Sleep Associated Hypoventilation - Transcutaneous carbon dioxide monitoring was not used, however significant hypoventilation was not suggested by oximetry.    Sleep Associated Hypoxemia - (Greater than 5 minutes O2 sat below 89%) was not present.  Baseline oxygen saturation was 95.6%. Lowest oxygen saturation was 85.0%.  Time spent less than or equal to 88% was 0.4 minutes.  Time spent less than or equal to 89% was 0.7 minutes.    Movement Activity: Mild increase in PLM index noted.    Periodic Limb Activity - There were 96 PLMs during the entire study. The PLM index was 12.8 movements per hour.  The PLM Arousal Index was 1.7 per hour.    REM EMG Activity - Excessive muscle activity was not present.    Nocturnal Behavior - Abnormal sleep related behaviors were not noted.    Bruxism - None apparent.    Cardiac Summary: No significant arrhythmias noted.  The average pulse rate was 53.8 bpm.  The minimum pulse rate was 45.9 bpm while the maximum pulse rate was 98.0 bpm. The rhythm is normal sinus. Arrhythmias were not noted.    Assessment:     Normal sleep latency without sleep aid, normal arousal index, and normal sleep efficiency.    Mild obstructive sleep apnea, worse supine and during REM, with > 50% of events during REM supine sleep.    Mild increase in PLM index noted.    Recommendations:    Recommend repeat polysomnography with full night titration of positive airway pressure therapy for the control of sleep disordered breathing.    Based on the presence of mild/moderate obstructive sleep apnea and excessive daytime sleepiness, treatment could be empirically initiated with Auto-titrating PAP therapy with a range of 5 - 15 cmH2O. Recommend clinical follow up with sleep management team.    Patient may be a candidate  for dental appliance through referral to Sleep Dentistry for the treatment of obstructive sleep apnea and/or socially disruptive snoring.    If devices are not acceptable or effective, patient may benefit from evaluation of possible surgical options.  If she is interested, would recommend referral to specialized ENT-Sleep provider.    Advise regarding the risks of drowsy driving.    Suggest optimizing sleep schedule and avoiding sleep deprivation.    Pharmacologic therapy should be used for management of restless legs syndrome only if present and clinically indicated and not based on the presence of periodic limb movements alone.    Cardiac Comments: Normal Sinus Rhythm  Diagnostic Codes:     Obstructive Sleep Apnea G47.33     _____________________________________   Electronically Signed By: Dwaine Perry MD 11/10/2017

## 2017-11-17 ENCOUNTER — OFFICE VISIT (OUTPATIENT)
Dept: SLEEP MEDICINE | Facility: CLINIC | Age: 67
End: 2017-11-17
Payer: COMMERCIAL

## 2017-11-17 VITALS
BODY MASS INDEX: 24.96 KG/M2 | OXYGEN SATURATION: 99 % | RESPIRATION RATE: 16 BRPM | HEIGHT: 65 IN | DIASTOLIC BLOOD PRESSURE: 71 MMHG | WEIGHT: 149.8 LBS | SYSTOLIC BLOOD PRESSURE: 109 MMHG | HEART RATE: 62 BPM

## 2017-11-17 DIAGNOSIS — G47.33 OSA (OBSTRUCTIVE SLEEP APNEA): ICD-10-CM

## 2017-11-17 PROCEDURE — 99213 OFFICE O/P EST LOW 20 MIN: CPT | Performed by: PHYSICIAN ASSISTANT

## 2017-11-17 NOTE — PATIENT INSTRUCTIONS

## 2017-11-17 NOTE — MR AVS SNAPSHOT
After Visit Summary   11/17/2017    Madonna Duque    MRN: [id number]           Patient Information     Date Of Birth          1950        Visit Information        Provider Department      11/17/2017 1:30 PM Goltz, Bennett Ezra, PA-C [facility] [place]        Today's Diagnoses     STORM (obstructive sleep apnea)          Care Instructions      Your BMI is Body mass index is 25.12 kg/(m^2).  Weight management is a personal decision.  If you are interested in exploring weight loss strategies, the following discussion covers the approaches that may be successful. Body mass index (BMI) is one way to tell whether you are at a healthy weight, overweight, or obese. It measures your weight in relation to your height.  A BMI of 18.5 to 24.9 is in the healthy range. A person with a BMI of 25 to 29.9 is considered overweight, and someone with a BMI of 30 or greater is considered obese. More than two-thirds of American adults are considered overweight or obese.  Being overweight or obese increases the risk for further weight gain. Excess weight may lead to heart disease and diabetes.  Creating and following plans for healthy eating and physical activity may help you improve your health.  Weight control is part of healthy lifestyle and includes exercise, emotional health, and healthy eating habits. Careful eating habits lifelong are the mainstay of weight control. Though there are significant health benefits from weight loss, long-term weight loss with diet alone may be very difficult to achieve- studies show long-term success with dietary management in less than 10% of people. Attaining a healthy weight may be especially difficult to achieve in those with severe obesity. In some cases, medications, devices and surgical management might be considered.  What can you do?  If you are overweight or obese and are interested in methods for weight loss, you should discuss this with your provider.      Consider reducing daily calorie intake by 500 calories.     Keep a food journal.     Avoiding skipping meals, consider cutting portions instead.    Diet combined with exercise helps maintain muscle while optimizing fat loss. Strength training is particularly important for building and maintaining muscle mass. Exercise helps reduce stress, increase energy, and improves fitness. Increasing exercise without diet control, however, may not burn enough calories to loose weight.       Start walking three days a week 10-20 minutes at a time    Work towards walking thirty minutes five days a week     Eventually, increase the speed of your walking for 1-2 minutes at time    In addition, we recommend that you review healthy lifestyles and methods for weight loss available through the National Institutes of Health patient information sites:  http://win.niddk.nih.gov/publications/index.htm    And look into health and wellness programs that may be available through your health insurance provider, employer, local community center, or grant club.                Follow-ups after your visit        Follow-up notes from your care team     Return if symptoms worsen or fail to improve.      Your next 10 appointments already scheduled     Dec 14, 2017  3:45 PM CST   Return Visit with Radha Bennett MD   [facility] ([facility])    [address] [place] [id number] [phone]              Who to contact     If you have questions or need follow up information about today's clinic visit or your schedule please contact [facility] directly at [phone].  Normal or non-critical lab and imaging results will be communicated to you by MyChart, letter or phone within 4 business days after the clinic has received the results. If you do not hear from us within 7 days, please contact the clinic through MyChart or phone. If you have a critical or abnormal  "lab result, we will notify you by phone as soon as possible.  Submit refill requests through Strut or call your pharmacy and they will forward the refill request to us. Please allow 3 business days for your refill to be completed.          Additional Information About Your Visit        MyChart Information     Strut lets you send messages to your doctor, view your test results, renew your prescriptions, schedule appointments and more. To sign up, go to [email or web address] . Click on \"Log in\" on the left side of the screen, which will take you to the Welcome page. Then click on \"Sign up Now\" on the right side of the page.     You will be asked to enter the access code listed below, as well as some personal information. Please follow the directions to create your username and password.     Your access code is: [email or web address]  Expires: 2017  7:13 AM     Your access code will  in 90 days. If you need help or a new code, please call your [place] clinic or [phone].        Care EveryWhere ID     This is your Care EveryWhere ID. This could be used by other organizations to access your [place] medical records  [id number]        Your Vitals Were     Pulse Respirations Height Pulse Oximetry BMI (Body Mass Index)       62 16 1.645 m (5' 4.75\") 99% 25.12 kg/m2        Blood Pressure from Last 3 Encounters:   17 109/71   17 110/62   17 122/81    Weight from Last 3 Encounters:   17 67.9 kg (149 lb 12.8 oz)   17 68.5 kg (151 lb)   17 67.6 kg (149 lb)              Today, you had the following     No orders found for display       Primary Care Provider Office Phone # Fax #    Jesse Tejada [phone] [address]  [facility] [address]        Equal Access to Services     SORAIDA GARCIA AH: Hadii anselmo Roldan, waaxda luqannie, qaybta nilson soto, jocy osorio ah. [facility] [phone].    ATENCIÓN: " Si habla carmelina, tiene a miller disposición servicios gratuitos de asistencia lingüística. Bridger vences [phone].    We comply with applicable federal civil rights laws and [place] laws. We do not discriminate on the basis of race, color, national origin, age, disability, sex, sexual orientation, or gender identity.            Thank you!     Thank you for choosing [facility]  for your care. Our goal is always to provide you with excellent care. Hearing back from our patients is one way we can continue to improve our services. Please take a few minutes to complete the written survey that you may receive in the mail after your visit with us. Thank you!             Your Updated Medication List - Protect others around you: Learn how to safely use, store and throw away your medicines at www.disposemymeds.org.          This list is accurate as of: 11/17/17  1:55 PM.  Always use your most recent med list.                   Brand Name Dispense Instructions for use Diagnosis    alirocumab 75 MG/ML injectable pen    PRALUENT    2 mL    Inject 1 mL (75 mg) Subcutaneous every 14 days    Mixed hyperlipidemia, Atherosclerosis of native coronary artery of native heart without angina pectoris, History of ST elevation myocardial infarction (STEMI)       aspirin 81 MG EC tablet     90 tablet    Take 1 tablet (81 mg) by mouth daily        carvedilol 3.125 MG tablet    COREG     Take 3.125 mg by mouth 2 times daily (with meals)        nitroGLYcerin 0.4 MG sublingual tablet    NITROSTAT    25 tablet    Place 1 tablet (0.4 mg) under the tongue every 5 minutes as needed for chest pain If you are still having symptoms after 3 doses (15 minutes) call 911.        priLOSEC 20 MG CR capsule   Generic drug:  omeprazole      Take 20 mg by mouth daily.        STATIN NOT PRESCRIBED (INTENTIONAL)      Please choose reason not prescribed, below    ST elevation myocardial infarction involving left anterior descending (LAD) coronary  artery (H)       VITAMIN D3 PO      Take 5,000 Units by mouth daily    Routine general medical examination at a health care facility

## 2017-11-17 NOTE — NURSING NOTE
"Chief Complaint   Patient presents with     Study Results     PSG f/u       Initial /71  Pulse 62  Resp 16  Ht 1.645 m (5' 4.75\")  Wt 67.9 kg (149 lb 12.8 oz)  SpO2 99%  BMI 25.12 kg/m2 Estimated body mass index is 25.12 kg/(m^2) as calculated from the following:    Height as of this encounter: 1.645 m (5' 4.75\").    Weight as of this encounter: 67.9 kg (149 lb 12.8 oz).  Medication Reconciliation: complete     ESS 5  Kaycee Aviles    "

## 2017-11-17 NOTE — PROGRESS NOTES
Sleep Study Follow-Up Visit:    Date on this visit: 11/17/2017    Madonna Duque comes in today for follow-up of her sleep study done on 11/6/2017 at the [facility] for she had one episode of blacking out. She does not have much of a concern about her own sleep. Her medical history is significant for DJD, ventricular fibrillation and STEMI in 2015, GERD.    Sleep latency 4.4 minutes without Ambien.  REM achieved.   REM latency 60.5 minutes.  Sleep efficiency 94.9%. Total sleep time 451 minutes.    Sleep architecture:  Stage 1, 6.8% (5%), stage 2, 56.3% (45-55%), stage 3, 14.2% (15-20%), stage REM, 22.7% (20-25%).  AHI was 10.5/hr, with desaturations down to 85%. She spent 0.7 minutes below 90% SpO2. RDI 15.8/hr.  REM RDI 28.7/hr, consistent with moderate REM STORM.  Supine RDI 27.1/hr, consistent with moderate SUPINE STORM.  Her non-supine RDI was 6.8/hr in 4 hours (including an hour of REM). Over 50% of her breathing events occurred in REM while supine. Periodic Limb Movement Index 12.8/hour, 1.7/hr were associated with arousals.       CPAP titration:  CPAP was not initiated. These findings were reviewed with the patient.  She is most comfortable sleeping on her side.    Past medical/surgical history, family history, social history, medications and allergies were reviewed.      Problem List:  Patient Active Problem List    Diagnosis Date Noted     STORM (obstructive sleep apnea) 11/10/2017     Priority: Medium     Diagnostic Study  Sleep Study 11/6/2017 - (149.0 lbs) AHI 10.5, RDI 15.8, Supine AHI 20.8, REM AHI 24.6, Low O2 85.0%, Time Spent ?88% 0.4 minutes       Coronary artery disease involving native coronary artery of native heart without angina pectoris      Priority: Medium     Gastroesophageal reflux disease without esophagitis 07/14/2016     Priority: Medium     Left ventricular diastolic dysfunction      Priority: Medium     Elevated blood sugar      Priority: Medium     Ventricular  fibrillation (H)      Priority: Medium     during mi       STEMI (ST elevation myocardial infarction) (HCC)      Priority: Medium     in [place] on cruise, lytics then ptca of lad, 2 stents, had vfib arrest, ef on angio 67%, 40% rca, no other dz       Advanced directives, counseling/discussion 12/17/2012     Priority: Medium     Discussed advance care planning with patient; however, patient declined at this time. 12/17/2012 Wendy LEDESMA MA           Vitamin D deficiency      Priority: Medium     Mixed hyperlipidemia 01/28/2012     Priority: Medium     Urethral stricture      Priority: Medium     (Problem list name updated by automated process. Provider to review and confirm.)       DJD (degenerative joint disease)      Priority: Medium     neck       Osteopenia      Priority: Medium     Colonic polyp      Priority: Medium     fu 2011 nl       Iron deficiency anemia      Priority: Medium     ugi and sbft nl, be nl  Diagnosis updated by automated process. Provider to review and confirm.          Impression/Plan:    (G47.33) STORM (obstructive sleep apnea)  Comment: Madonna had mild STORM that was almost entirely related to sleeping supine. Her overall AHI was 10/hr. It was about 22/hr supine and <2/hr in other positions. She does not feel she sleeps supine at home. In her bed, it is uncomfortable.   Plan: I advised her to use the Slumberbump to ensure she does not end up supine. We also discussed dental appliances or CPAP if her symptoms worsen.      She will follow up with me in the future as needed.     Fifteen minutes spent with patient, all of which were spent face-to-face counseling, consulting, coordinating plan of care.      Bennett Goltz, PA-C    CC: No ref. provider found

## 2017-12-05 ENCOUNTER — TRANSFERRED RECORDS (OUTPATIENT)
Dept: HEALTH INFORMATION MANAGEMENT | Facility: CLINIC | Age: 67
End: 2017-12-05

## 2017-12-14 ENCOUNTER — OFFICE VISIT (OUTPATIENT)
Dept: CARDIOLOGY | Facility: CLINIC | Age: 67
End: 2017-12-14
Attending: INTERNAL MEDICINE
Payer: COMMERCIAL

## 2017-12-14 VITALS
SYSTOLIC BLOOD PRESSURE: 117 MMHG | WEIGHT: 149 LBS | HEART RATE: 75 BPM | BODY MASS INDEX: 24.83 KG/M2 | HEIGHT: 65 IN | DIASTOLIC BLOOD PRESSURE: 76 MMHG

## 2017-12-14 DIAGNOSIS — I25.10 CORONARY ARTERY DISEASE INVOLVING NATIVE CORONARY ARTERY OF NATIVE HEART WITHOUT ANGINA PECTORIS: ICD-10-CM

## 2017-12-14 PROCEDURE — 99213 OFFICE O/P EST LOW 20 MIN: CPT | Performed by: INTERNAL MEDICINE

## 2017-12-14 NOTE — LETTER
12/14/2017    Jesse Tejada MD  [address] [facility] [address]  [facility] [address]    RE: Madonna Duque       Dear Colleague,    I had the pleasure of seeing Madonna Duque in the [facility].    HPI and Plan:   HISTORY OF PRESENT ILLNESS:  Ms. Madonna Duque was seen in followup at [facility] in [place].  She is followed for history of coronary disease and prior myocardial infarct.      2 years ago, she was in [place].  She experienced an acute anterior ST elevation myocardial infarct complicated by ventricular fibrillatory arrest.  She was evacuated by air and eventually underwent coronary angiography which resulted in placement of 2 stents in the LAD.  She has done well since that time without recurrent symptoms.      She has dyslipidemia.  She had been in a study at [facility] run by Dr. Surjit Perez that was discontinued.  She isnow on cholesterol therapy with Praluent.  Her LDL has fallen to 81 mg/dl. She has been significantly intolerant of statins due to severe myalgias with rosuvastatin, atorvastatin and simvastatin.      She continues on aspirin and beta blockade.  She completed a stress echocardiogram last year with an excellent exercise time and no evidence of ischemia.      Her carvedilol was decreased due to fatigue and light-headedness and she feels much better.  Three months ago, she was syncopal on the toilet but has had no recurrence. It was presumably a vagal event.     She is feeling well and denies any anginal symptoms.  She denies any exertional tolerance or shortness breath.      PHYSICAL EXAMINATION:  This is a healthy woman in no apparent distress.  The blood pressure was 117/76 mmHg, heart rate 75 beats per minute and regular and respiratory rate 14-18 per minute.  The chest was clear to auscultation.  On cardiac auscultation, there was an S1 and S2 without extra sounds or murmur.      IN ASSESSMENT:  Ms. Duque is doing very well without evidence of  recurrent ischemia.  I have recommended discontinuing Effient when she completes her current prescription that she will have completed 1 year of therapy.      With regard to her dyslipidemia which is quite significant, she is be an ideal candidate for a PCSK9 inhibitor.She is now on Praluent with excellent results.  I believe she will benefit from aggressive lipid-lowering therapy.      In the absence of symptoms, I have recommended a return with myself at 1 year.        KENDRICK MONZON MD    Orders Placed This Encounter   Procedures     Lipid Profile     ALT     Follow-Up with Cardiologist       No orders of the defined types were placed in this encounter.      There are no discontinued medications.      Encounter Diagnosis   Name Primary?     Coronary artery disease involving native coronary artery of native heart without angina pectoris        CURRENT MEDICATIONS:  Current Outpatient Prescriptions   Medication Sig Dispense Refill     carvedilol (COREG) 3.125 MG tablet Take 3.125 mg by mouth 2 times daily (with meals)       alirocumab (PRALUENT) 75 MG/ML injectable pen Inject 1 mL (75 mg) Subcutaneous every 14 days 2 mL 11     nitroglycerin (NITROSTAT) 0.4 MG SL tablet Place 1 tablet (0.4 mg) under the tongue every 5 minutes as needed for chest pain If you are still having symptoms after 3 doses (15 minutes) call 911. 25 tablet 0     aspirin 81 MG EC tablet Take 1 tablet (81 mg) by mouth daily 90 tablet 3     Cholecalciferol (VITAMIN D3 PO) Take 5,000 Units by mouth daily       omeprazole (PRILOSEC) 20 MG capsule Take 20 mg by mouth daily.       STATIN NOT PRESCRIBED, INTENTIONAL, Please choose reason not prescribed, below         ALLERGIES     Allergies   Allergen Reactions     Crestor [Rosuvastatin]      Muscle aches, debilitating     Codeine Nausea and Vomiting     Shrimp Nausea and Vomiting       PAST MEDICAL HISTORY:  Past Medical History:   Diagnosis Date     Abdominal discomfort 12/13    ct abd and pelvis  no cause found     Chest pain 2/14    neg est echo     Colonic polyp 2005    fu 2011 nl and to fu 2021     Coronary artery disease involving native coronary artery of native heart without angina pectoris      Dizzy 2009    mri brain nl     DJD (degenerative joint disease)     neck     Elevated blood sugar      GERD (gastroesophageal reflux disease) 2007    egd, benign stricture     Hypercholesteremia 2010    crestor intolerant, lipitor intolerant     CORINA (iron deficiency anaemia) 2008    ugi and sbft nl, be nl     Left ventricular diastolic dysfunction 1/16    echo done for fu and nl ef, no wma     Osteopenia     fu dexa 2011 -1.9 fem neck and -1.2 spine, stable c/w 2007     PONV (postoperative nausea and vomiting)      STEMI (ST elevation myocardial infarction) (H) 12/15    in [place] on cruise, lytics then ptca of lad, 2 stents, had vfib arrest, ef on angio 67%, 40% rca, no other dz     Syncope     episode August 2017, possible vasovagal     Urethral strictures      Ventricular fibrillation (H) 12/15    during mi     Vitamin D deficiency        PAST SURGICAL HISTORY:  Past Surgical History:   Procedure Laterality Date     C LIGATE FALLOPIAN TUBE,POSTPARTUM  84    Tubal Ligation     HC REMOVAL OF OVARY/TUBE(S)  80    Salpingo-Oophorectomy, Unilateral(endometriosis)     LAPAROTOMY EXPLORATORY  79    due to endomet, removed ovary     REPAIR PTOSIS BILATERAL  2/7/2012    Procedure:REPAIR PTOSIS BILATERAL; BILATERAL UPPER LID MECHANICAL PTOSIS REPAIR,and ptosis repair; Surgeon:JOVANA BETANCUR; Location:[facility]       FAMILY HISTORY:  Family History   Problem Relation Age of Onset     DIABETES Father      CANCER Father      [place]     CANCER Mother      CLL     DIABETES Paternal Aunt      DIABETES Paternal Aunt      DIABETES Paternal Aunt      DIABETES Paternal Uncle      DIABETES Paternal Uncle      DIABETES Paternal Uncle      Breast Cancer Paternal Aunt      Cancer - colorectal Paternal Aunt      Cancer -  "colorectal Paternal Aunt      CANCER Paternal Uncle      bladder       SOCIAL HISTORY:  Social History     Social History     Marital status:      Spouse name: N/A     Number of children: 1     Years of education: N/A     Occupational History     rn, retired 2015 [employer],[address]     Social History Main Topics     Smoking status: Former Smoker     Quit date: 1/28/1982     Smokeless tobacco: Never Used      Comment: quit 23 years ago     Alcohol use No     Drug use: No     Sexual activity: Yes     Partners: Male     Other Topics Concern     Special Diet No     heart healthy, weight watches      Exercise No     Social History Narrative       Review of Systems:  Skin:  Negative       Eyes:  Positive for glasses    ENT:  Negative      Respiratory:  Negative       Cardiovascular:  Negative for;palpitations;lightheadedness   upon standing (chronic r/t low BP per pt)   Gastroenterology: Positive for heartburn;reflux    Genitourinary:  Negative      Musculoskeletal:  Positive for   L hip spurs  Neurologic:  Negative      Psychiatric:  Positive for   worked NOC shift for years, occ issues sleeping   Heme/Lymph/Imm:  Positive for allergies    Endocrine:  Negative        Physical Exam:  Vitals: /76  Pulse 75  Ht 1.654 m (5' 5.12\")  Wt 67.6 kg (149 lb)  BMI 24.71 kg/m2    Constitutional:  cooperative, alert and oriented, well developed, well nourished, in no acute distress        Skin:  warm and dry to the touch          Head:  normocephalic        Eyes:  sclera white        Lymph:      ENT:           Neck:           Respiratory:  normal breath sounds, clear to auscultation, normal A-P diameter, normal symmetry, normal respiratory excursion, no use of accessory muscles         Cardiac: regular rhythm, normal S1/S2, no S3 or S4, apical impulse not displaced, no murmurs, gallops or rubs                                                         GI:           Extremities and Muscular " Skeletal:  no deformities, clubbing, cyanosis, erythema observed              Neurological:  no gross motor deficits        Psych:  affect appropriate, oriented to time, person and place      Thank you for allowing me to participate in the care of your patient.    Sincerely,     Rahda Bennett MD     [facility]

## 2017-12-14 NOTE — PROGRESS NOTES
HPI and Plan:   HISTORY OF PRESENT ILLNESS:  Ms. Madonna Duque was seen in followup at [facility] in [place].  She is followed for history of coronary disease and prior myocardial infarct.      2 years ago, she was in [place].  She experienced an acute anterior ST elevation myocardial infarct complicated by ventricular fibrillatory arrest.  She was evacuated by air and eventually underwent coronary angiography which resulted in placement of 2 stents in the LAD.  She has done well since that time without recurrent symptoms.      She has dyslipidemia.  She had been in a study at [facility] run by Dr. Surjit Perez that was discontinued.  She isnow on cholesterol therapy with Praluent.  Her LDL has fallen to 81 mg/dl. She has been significantly intolerant of statins due to severe myalgias with rosuvastatin, atorvastatin and simvastatin.      She continues on aspirin and beta blockade.  She completed a stress echocardiogram last year with an excellent exercise time and no evidence of ischemia.      Her carvedilol was decreased due to fatigue and light-headedness and she feels much better.  Three months ago, she was syncopal on the toilet but has had no recurrence. It was presumably a vagal event.     She is feeling well and denies any anginal symptoms.  She denies any exertional tolerance or shortness breath.      PHYSICAL EXAMINATION:  This is a healthy woman in no apparent distress.  The blood pressure was 117/76 mmHg, heart rate 75 beats per minute and regular and respiratory rate 14-18 per minute.  The chest was clear to auscultation.  On cardiac auscultation, there was an S1 and S2 without extra sounds or murmur.      IN ASSESSMENT:  Ms. Duque is doing very well without evidence of recurrent ischemia.  I have recommended discontinuing Effient when she completes her current prescription that she will have completed 1 year of therapy.      With regard to her dyslipidemia which is quite significant, she is be an  ideal candidate for a PCSK9 inhibitor.She is now on Praluent with excellent results.  I believe she will benefit from aggressive lipid-lowering therapy.      In the absence of symptoms, I have recommended a return with myself at 1 year.        KENDRICK MONZON MD    Orders Placed This Encounter   Procedures     Lipid Profile     ALT     Follow-Up with Cardiologist       No orders of the defined types were placed in this encounter.      There are no discontinued medications.      Encounter Diagnosis   Name Primary?     Coronary artery disease involving native coronary artery of native heart without angina pectoris        CURRENT MEDICATIONS:  Current Outpatient Prescriptions   Medication Sig Dispense Refill     carvedilol (COREG) 3.125 MG tablet Take 3.125 mg by mouth 2 times daily (with meals)       alirocumab (PRALUENT) 75 MG/ML injectable pen Inject 1 mL (75 mg) Subcutaneous every 14 days 2 mL 11     nitroglycerin (NITROSTAT) 0.4 MG SL tablet Place 1 tablet (0.4 mg) under the tongue every 5 minutes as needed for chest pain If you are still having symptoms after 3 doses (15 minutes) call 911. 25 tablet 0     aspirin 81 MG EC tablet Take 1 tablet (81 mg) by mouth daily 90 tablet 3     Cholecalciferol (VITAMIN D3 PO) Take 5,000 Units by mouth daily       omeprazole (PRILOSEC) 20 MG capsule Take 20 mg by mouth daily.       STATIN NOT PRESCRIBED, INTENTIONAL, Please choose reason not prescribed, below         ALLERGIES     Allergies   Allergen Reactions     Crestor [Rosuvastatin]      Muscle aches, debilitating     Codeine Nausea and Vomiting     Shrimp Nausea and Vomiting       PAST MEDICAL HISTORY:  Past Medical History:   Diagnosis Date     Abdominal discomfort 12/13    ct abd and pelvis no cause found     Chest pain 2/14    neg est echo     Colonic polyp 2005 fu 2011 nl and to fu 2021     Coronary artery disease involving native coronary artery of native heart without angina pectoris      Dizzy 2009    mri brain  nl     DJD (degenerative joint disease)     neck     Elevated blood sugar      GERD (gastroesophageal reflux disease) 2007    egd, benign stricture     Hypercholesteremia 2010    crestor intolerant, lipitor intolerant     CORINA (iron deficiency anaemia) 2008    ugi and sbft nl, be nl     Left ventricular diastolic dysfunction 1/16    echo done for fu and nl ef, no wma     Osteopenia     fu dexa 2011 -1.9 fem neck and -1.2 spine, stable c/w 2007     PONV (postoperative nausea and vomiting)      STEMI (ST elevation myocardial infarction) (H) 12/15    in [place] on cruise, lytics then ptca of lad, 2 stents, had vfib arrest, ef on angio 67%, 40% rca, no other dz     Syncope     episode August 2017, possible vasovagal     Urethral strictures      Ventricular fibrillation (H) 12/15    during mi     Vitamin D deficiency        PAST SURGICAL HISTORY:  Past Surgical History:   Procedure Laterality Date     C LIGATE FALLOPIAN TUBE,POSTPARTUM  85    Tubal Ligation     HC REMOVAL OF OVARY/TUBE(S)  81    Salpingo-Oophorectomy, Unilateral(endometriosis)     LAPAROTOMY EXPLORATORY  79    due to endomet, removed ovary     REPAIR PTOSIS BILATERAL  2/7/2012    Procedure:REPAIR PTOSIS BILATERAL; BILATERAL UPPER LID MECHANICAL PTOSIS REPAIR,and ptosis repair; Surgeon:JOVANA BETANCUR; Location:[facility]       FAMILY HISTORY:  Family History   Problem Relation Age of Onset     DIABETES Father      CANCER Father      [place]     CANCER Mother      CLL     DIABETES Paternal Aunt      DIABETES Paternal Aunt      DIABETES Paternal Aunt      DIABETES Paternal Uncle      DIABETES Paternal Uncle      DIABETES Paternal Uncle      Breast Cancer Paternal Aunt      Cancer - colorectal Paternal Aunt      Cancer - colorectal Paternal Aunt      CANCER Paternal Uncle      bladder       SOCIAL HISTORY:  Social History     Social History     Marital status:      Spouse name: N/A     Number of children: 1     Years of education: N/A  "    Occupational History     rn, retired 2015 [employer],[address]     Social History Main Topics     Smoking status: Former Smoker     Quit date: 1/28/1982     Smokeless tobacco: Never Used      Comment: quit 23 years ago     Alcohol use No     Drug use: No     Sexual activity: Yes     Partners: Male     Other Topics Concern     Special Diet No     heart healthy, weight watches      Exercise No     Social History Narrative       Review of Systems:  Skin:  Negative       Eyes:  Positive for glasses    ENT:  Negative      Respiratory:  Negative       Cardiovascular:  Negative for;palpitations;lightheadedness   upon standing (chronic r/t low BP per pt)   Gastroenterology: Positive for heartburn;reflux    Genitourinary:  Negative      Musculoskeletal:  Positive for   L hip spurs  Neurologic:  Negative      Psychiatric:  Positive for   worked NOC shift for years, occ issues sleeping   Heme/Lymph/Imm:  Positive for allergies    Endocrine:  Negative        Physical Exam:  Vitals: /76  Pulse 75  Ht 1.654 m (5' 5.12\")  Wt 67.6 kg (149 lb)  BMI 24.71 kg/m2    Constitutional:  cooperative, alert and oriented, well developed, well nourished, in no acute distress        Skin:  warm and dry to the touch          Head:  normocephalic        Eyes:  sclera white        Lymph:      ENT:           Neck:           Respiratory:  normal breath sounds, clear to auscultation, normal A-P diameter, normal symmetry, normal respiratory excursion, no use of accessory muscles         Cardiac: regular rhythm, normal S1/S2, no S3 or S4, apical impulse not displaced, no murmurs, gallops or rubs                                                         GI:           Extremities and Muscular Skeletal:  no deformities, clubbing, cyanosis, erythema observed              Neurological:  no gross motor deficits        Psych:  affect appropriate, oriented to time, person and place          CC  Radha Bennett MD  [address]  [place] [address]

## 2017-12-14 NOTE — MR AVS SNAPSHOT
"              After Visit Summary   2017    Madonna Duque    MRN: [id number]           Patient Information     Date Of Birth          1950        Visit Information        Provider Department      2017 3:45 PM Radha Bennett MD [facility]        Today's Diagnoses     Coronary artery disease involving native coronary artery of native heart without angina pectoris           Follow-ups after your visit        Who to contact     If you have questions or need follow up information about today's clinic visit or your schedule please contact [facility] directly at [phone].  Normal or non-critical lab and imaging results will be communicated to you by [employer], letter or phone within 4 business days after the clinic has received the results. If you do not hear from us within 7 days, please contact the clinic through [employer] or phone. If you have a critical or abnormal lab result, we will notify you by phone as soon as possible.  Submit refill requests through [employer] or call your pharmacy and they will forward the refill request to us. Please allow 3 business days for your refill to be completed.          Additional Information About Your Visit        MyChart Information     [employer] lets you send messages to your doctor, view your test results, renew your prescriptions, schedule appointments and more. To sign up, go to [email or web address] . Click on \"Log in\" on the left side of the screen, which will take you to the Welcome page. Then click on \"Sign up Now\" on the right side of the page.     You will be asked to enter the access code listed below, as well as some personal information. Please follow the directions to create your username and password.     Your access code is: [email or web address]  Expires: 3/14/2018  4:51 PM     Your access code will  in 90 days. If you need help or a new code, please call your " "[facility] or [phone].        Care EveryWhere ID     This is your Care EveryWhere ID. This could be used by other organizations to access your [facility] medical records  [id number]        Your Vitals Were     Pulse Height BMI (Body Mass Index)             75 1.654 m (5' 5.12\") 24.71 kg/m2          Blood Pressure from Last 3 Encounters:   12/14/17 117/76   11/17/17 109/71   09/27/17 110/62    Weight from Last 3 Encounters:   12/14/17 67.6 kg (149 lb)   11/17/17 67.9 kg (149 lb 12.8 oz)   09/27/17 68.5 kg (151 lb)              We Performed the Following     Follow-Up with Cardiologist        Primary Care Provider Office Phone # Fax #    Jesse Tejada [phone] [address] [facility] [address]  [place] [address]        Equal Access to Services     JOANIE GARCIA : Hadii aad ku hadasho Soomaali, waaxda luqadaha, qaybta kaalmada adeegyada, waxay idiin hayolivian sohail osorio . So [facility] [phone].    ATENCIÓN: Si habla espdarnell, tiene a miller disposición servicios gratuitos de asistencia lingüística. Llame al [phone].    We comply with applicable federal civil rights laws and [place] laws. We do not discriminate on the basis of race, color, national origin, age, disability, sex, sexual orientation, or gender identity.            Thank you!     Thank you for choosing [facility]  for your care. Our goal is always to provide you with excellent care. Hearing back from our patients is one way we can continue to improve our services. Please take a few minutes to complete the written survey that you may receive in the mail after your visit with us. Thank you!             Your Updated Medication List - Protect others around you: Learn how to safely use, store and throw away your medicines at www.disposemymeds.org.          This list is accurate as of: 12/14/17  4:51 PM.  Always use your most recent med list.                   Brand Name Dispense " Instructions for use Diagnosis    alirocumab 75 MG/ML injectable pen    PRALUENT    2 mL    Inject 1 mL (75 mg) Subcutaneous every 14 days    Mixed hyperlipidemia, Atherosclerosis of native coronary artery of native heart without angina pectoris, History of ST elevation myocardial infarction (STEMI)       aspirin 81 MG EC tablet     90 tablet    Take 1 tablet (81 mg) by mouth daily        carvedilol 3.125 MG tablet    COREG     Take 3.125 mg by mouth 2 times daily (with meals)        nitroGLYcerin 0.4 MG sublingual tablet    NITROSTAT    25 tablet    Place 1 tablet (0.4 mg) under the tongue every 5 minutes as needed for chest pain If you are still having symptoms after 3 doses (15 minutes) call 911.        priLOSEC 20 MG CR capsule   Generic drug:  omeprazole      Take 20 mg by mouth daily.        STATIN NOT PRESCRIBED (INTENTIONAL)      Please choose reason not prescribed, below    ST elevation myocardial infarction involving left anterior descending (LAD) coronary artery (H)       VITAMIN D3 PO      Take 5,000 Units by mouth daily    Routine general medical examination at a health care facility

## 2017-12-19 ENCOUNTER — TRANSFERRED RECORDS (OUTPATIENT)
Dept: HEALTH INFORMATION MANAGEMENT | Facility: CLINIC | Age: 67
End: 2017-12-19

## 2018-01-02 ENCOUNTER — TRANSFERRED RECORDS (OUTPATIENT)
Dept: HEALTH INFORMATION MANAGEMENT | Facility: CLINIC | Age: 68
End: 2018-01-02

## 2018-01-15 ENCOUNTER — TELEPHONE (OUTPATIENT)
Dept: CARDIOLOGY | Facility: CLINIC | Age: 68
End: 2018-01-15

## 2018-01-15 NOTE — TELEPHONE ENCOUNTER
RN called patient in response to voicemail left. Patient needed the number of  specialty pharmacy to order her delivery of Praluent. RN provided the phone number. Patient to call with any further questions.

## 2018-01-30 ENCOUNTER — TRANSFERRED RECORDS (OUTPATIENT)
Dept: HEALTH INFORMATION MANAGEMENT | Facility: CLINIC | Age: 68
End: 2018-01-30

## 2018-03-15 ENCOUNTER — TRANSFERRED RECORDS (OUTPATIENT)
Dept: HEALTH INFORMATION MANAGEMENT | Facility: CLINIC | Age: 68
End: 2018-03-15

## 2018-04-09 DIAGNOSIS — E78.2 MIXED HYPERLIPIDEMIA: ICD-10-CM

## 2018-04-09 DIAGNOSIS — I25.2 HISTORY OF ST ELEVATION MYOCARDIAL INFARCTION (STEMI): ICD-10-CM

## 2018-04-09 DIAGNOSIS — I25.10 ATHEROSCLEROSIS OF NATIVE CORONARY ARTERY OF NATIVE HEART WITHOUT ANGINA PECTORIS: ICD-10-CM

## 2018-05-01 ENCOUNTER — TELEPHONE (OUTPATIENT)
Dept: CARDIOLOGY | Facility: CLINIC | Age: 68
End: 2018-05-01

## 2018-05-01 NOTE — TELEPHONE ENCOUNTER
[facility] Prior Authorization Team   Phone: [phone]  Fax: [phone]    PA Initiation    Medication: PRALUENT 75MG/ML - PA INITIATED  Insurance Company: [employer] - Phone [phone] Fax [phone]  Pharmacy Filling the Rx: [facility] - [place], [place] - [address]  Filling Pharmacy Phone: [phone]  Filling Pharmacy Fax: [phone]  Start Date: 5/1/2018

## 2018-05-02 NOTE — TELEPHONE ENCOUNTER
MEDICATION APPEAL APPROVED    Medication: PRALUENT 75MG/ML - [place] APPROVED  Authorization Effective Date: 2/1/2018  Authorization Expiration Date: 5/2/2019  Approved Dose/Quantity: 2ML  Reference #:     Insurance Company:    Expected CoPay:       CoPay Card Available:      Foundation Assistance Needed:    Which Pharmacy is filling the prescription (Not needed for infusion/clinic administered): [facility] - [place], [place] - [facility] [address]

## 2018-07-24 ENCOUNTER — TELEPHONE (OUTPATIENT)
Dept: CARDIOLOGY | Facility: CLINIC | Age: 68
End: 2018-07-24

## 2018-07-24 NOTE — TELEPHONE ENCOUNTER
RN called pt back and left a VM in response to side effects of Praluent. Asked pt to call back.     Pt reports the following symptoms starting after her injection 2 weeks ago and after last injection on 7/22/18:  -intermittent sharp pain/muscle ache in her lower back down to above her knees mainly on her right side  -the pain causes intermittent weakness. She notices it when she steps out on her right leg-the leg gives out on her (she has not fallen)  -at rest she described it as an uneasiness (discomfort 1/10)  -pt exercises consistently every day. Has not made changes to her exercise program Is still able to exercise but is cautious, will stop when the pain occurs and make changes to her exercise program.   -she rotates her injections in her abdomen-has not used her thighs for the injections  -pt has an appt with her PMD in 3 weeks.   -the symptoms did not lessen between injections  -she stated this is similar to the symptoms she had while on statins.   RN advised pt not to take any more injections and to call if the symptoms increase or decrease. Will forward to Dr. Bennett and team.

## 2018-07-26 NOTE — TELEPHONE ENCOUNTER
Attempted to contact patient to review Dr. Bennett's recommendation to discontinue using praluent injections due to c/o back and leg pain. Left message for patient to call back.

## 2018-07-26 NOTE — TELEPHONE ENCOUNTER
Spoke with patient to review Dr. Bennett's recommendation to hold praluent. Patient will call back with an update as to whether she feels better off the medication. She also has a PCP visit coming up and will discuss her back and leg pain with him.

## 2018-08-31 ENCOUNTER — OFFICE VISIT (OUTPATIENT)
Dept: FAMILY MEDICINE | Facility: CLINIC | Age: 68
End: 2018-08-31
Payer: COMMERCIAL

## 2018-08-31 VITALS
TEMPERATURE: 97.4 F | BODY MASS INDEX: 24.66 KG/M2 | WEIGHT: 148 LBS | SYSTOLIC BLOOD PRESSURE: 96 MMHG | OXYGEN SATURATION: 99 % | DIASTOLIC BLOOD PRESSURE: 63 MMHG | HEART RATE: 50 BPM | HEIGHT: 65 IN

## 2018-08-31 DIAGNOSIS — I25.10 CORONARY ARTERY DISEASE INVOLVING NATIVE CORONARY ARTERY OF NATIVE HEART WITHOUT ANGINA PECTORIS: ICD-10-CM

## 2018-08-31 DIAGNOSIS — E78.2 MIXED HYPERLIPIDEMIA: ICD-10-CM

## 2018-08-31 DIAGNOSIS — E55.9 VITAMIN D DEFICIENCY: ICD-10-CM

## 2018-08-31 DIAGNOSIS — D50.8 OTHER IRON DEFICIENCY ANEMIA: ICD-10-CM

## 2018-08-31 DIAGNOSIS — K63.5 POLYP OF COLON, UNSPECIFIED PART OF COLON, UNSPECIFIED TYPE: ICD-10-CM

## 2018-08-31 DIAGNOSIS — I51.9 LEFT VENTRICULAR DIASTOLIC DYSFUNCTION: ICD-10-CM

## 2018-08-31 DIAGNOSIS — Z00.00 ROUTINE GENERAL MEDICAL EXAMINATION AT A HEALTH CARE FACILITY: Primary | ICD-10-CM

## 2018-08-31 DIAGNOSIS — I49.01 VENTRICULAR FIBRILLATION (H): ICD-10-CM

## 2018-08-31 DIAGNOSIS — K21.9 GASTROESOPHAGEAL REFLUX DISEASE WITHOUT ESOPHAGITIS: ICD-10-CM

## 2018-08-31 DIAGNOSIS — M85.80 OSTEOPENIA, UNSPECIFIED LOCATION: ICD-10-CM

## 2018-08-31 DIAGNOSIS — R73.9 ELEVATED BLOOD SUGAR: ICD-10-CM

## 2018-08-31 PROBLEM — G47.33 OSA (OBSTRUCTIVE SLEEP APNEA): Status: RESOLVED | Noted: 2017-11-10 | Resolved: 2018-08-31

## 2018-08-31 LAB
ERYTHROCYTE [DISTWIDTH] IN BLOOD BY AUTOMATED COUNT: 14 % (ref 10–15)
HBA1C MFR BLD: 5.8 % (ref 0–5.6)
HCT VFR BLD AUTO: 40.1 % (ref 35–47)
HGB BLD-MCNC: 13.1 G/DL (ref 11.7–15.7)
MCH RBC QN AUTO: 29.3 PG (ref 26.5–33)
MCHC RBC AUTO-ENTMCNC: 32.7 G/DL (ref 31.5–36.5)
MCV RBC AUTO: 90 FL (ref 78–100)
PLATELET # BLD AUTO: 244 10E9/L (ref 150–450)
RBC # BLD AUTO: 4.47 10E12/L (ref 3.8–5.2)
WBC # BLD AUTO: 4.9 10E9/L (ref 4–11)

## 2018-08-31 PROCEDURE — 82306 VITAMIN D 25 HYDROXY: CPT | Performed by: INTERNAL MEDICINE

## 2018-08-31 PROCEDURE — 85027 COMPLETE CBC AUTOMATED: CPT | Performed by: INTERNAL MEDICINE

## 2018-08-31 PROCEDURE — 80053 COMPREHEN METABOLIC PANEL: CPT | Performed by: INTERNAL MEDICINE

## 2018-08-31 PROCEDURE — 83540 ASSAY OF IRON: CPT | Performed by: INTERNAL MEDICINE

## 2018-08-31 PROCEDURE — 80061 LIPID PANEL: CPT | Performed by: INTERNAL MEDICINE

## 2018-08-31 PROCEDURE — 82728 ASSAY OF FERRITIN: CPT | Performed by: INTERNAL MEDICINE

## 2018-08-31 PROCEDURE — 83550 IRON BINDING TEST: CPT | Performed by: INTERNAL MEDICINE

## 2018-08-31 PROCEDURE — 83036 HEMOGLOBIN GLYCOSYLATED A1C: CPT | Performed by: INTERNAL MEDICINE

## 2018-08-31 PROCEDURE — 36415 COLL VENOUS BLD VENIPUNCTURE: CPT | Performed by: INTERNAL MEDICINE

## 2018-08-31 PROCEDURE — 99397 PER PM REEVAL EST PAT 65+ YR: CPT | Performed by: INTERNAL MEDICINE

## 2018-08-31 PROCEDURE — 84443 ASSAY THYROID STIM HORMONE: CPT | Performed by: INTERNAL MEDICINE

## 2018-08-31 NOTE — PATIENT INSTRUCTIONS
I would recommend getting the new shingles shot called shingrix, but I would do it at your pharmacy as they can check with the insurance company to see if it is paid for.    Jesse Tejada M.D.              Preventive Health Recommendations    Female Ages 65 +    Yearly exam:     See your health care provider every year in order to  o Review health changes.   o Discuss preventive care.    o Review your medicines if your doctor has prescribed any.      You no longer need a yearly Pap test unless you've had an abnormal Pap test in the past 10 years. If you have vaginal symptoms, such as bleeding or discharge, be sure to talk with your provider about a Pap test.      Every 1 to 2 years, have a mammogram.  If you are over 69, talk with your health care provider about whether or not you want to continue having screening mammograms.      Every 10 years, have a colonoscopy. Or, have a yearly FIT test (stool test). These exams will check for colon cancer.       Have a cholesterol test every 5 years, or more often if your doctor advises it.       Have a diabetes test (fasting glucose) every three years. If you are at risk for diabetes, you should have this test more often.       At age 65, have a bone density scan (DEXA) to check for osteoporosis (brittle bone disease).    Shots:    Get a flu shot each year.    Get a tetanus shot every 10 years.    Talk to your doctor about your pneumonia vaccines. There are now two you should receive - Pneumovax (PPSV 23) and Prevnar (PCV 13).    Talk to your pharmacist about the shingles vaccine.    Talk to your doctor about the hepatitis B vaccine.    Nutrition:     Eat at least 5 servings of fruits and vegetables each day.      Eat whole-grain bread, whole-wheat pasta and brown rice instead of white grains and rice.      Get adequate Calcium and Vitamin D.     Lifestyle    Exercise at least 150 minutes a week (30 minutes a day, 5 days a week). This will help you control your weight and  prevent disease.      Limit alcohol to one drink per day.      No smoking.       Wear sunscreen to prevent skin cancer.       See your dentist twice a year for an exam and cleaning.      See your eye doctor every 1 to 2 years to screen for conditions such as glaucoma, macular degeneration and cataracts.

## 2018-08-31 NOTE — LETTER
[place] [address] [facility]  [address]  [place]  [address]  Tel: [phone]    September 6, 2018    Madonna Duque  [address] [facility] [address]        Dear Ms. Duque,    One additional lab is your vitamin D level which is slightly high.  I would lower your intake to 2500mg a day.    If you have any questions please call me.             Ref Range & Units 6d ago   1yr ago   3yr ago   4yr ago   5yr ago        Vitamin D Deficiency screening 20 - 75 ug/L 90 (H) 66CM 47R, CM 23 (L)R, CM 20 (L)R, CM     Comments: Season, race, dietary intake, and treatment affect the concentration of   25-hydroxy-Vitamin D. Values may decrease during winter months and increase   during summer months. Values 20-29 ug/L may indicate Vitamin D insufficiency   and values <20 ug/L may indicate Vitamin D deficiency.   Vitamin D determination is routinely performed by an immunoassay specific for   25 hydroxyvitamin D3.  If an individual is on vitamin D2 (ergocalciferol)   supplementation, please specify 25 OH vitamin D2 and D3 level determination by    LCMSMS test [id number].        Resulting Agency                    If you have any further questions or problems, please contact our office.      Sincerely,    Jesse Tejada MD / geovany

## 2018-08-31 NOTE — PROGRESS NOTES
SUBJECTIVE:   Madonna Duque is a 67 year old female who presents for Preventive Visit.    The patient is doing well, has had occasionally uti in last year, no vag c/o, no chest pain or shortness of breath, works out reg.  No other c/o.  Had aching with praluent, does not tolerate statins, off praluent fine.               Past Medical History:      Past Medical History:   Diagnosis Date     Abdominal discomfort 12/13    ct abd and pelvis no cause found     Chest pain 2/14    neg est echo     Colonic polyp 2005    fu 2011 nl and to fu 2021     Coronary artery disease involving native coronary artery of native heart without angina pectoris      Dizzy 2009    mri brain nl     DJD (degenerative joint disease)     neck     Elevated blood sugar      GERD (gastroesophageal reflux disease) 2007    egd, benign stricture     Hypercholesteremia 2010    crestor intolerant, lipitor intolerant     CORINA (iron deficiency anaemia) 2008    ugi and sbft nl, be nl     Left ventricular diastolic dysfunction 1/16    echo done for fu and nl ef, no wma     Osteopenia     fu dexa 2011 -1.9 fem neck and -1.2 spine, stable c/w 2007     PONV (postoperative nausea and vomiting)      STEMI (ST elevation myocardial infarction) (H) 12/15    in [place] on cruise, lytics then ptca of lad, 2 stents, had vfib arrest, ef on angio 67%, 40% rca, no other dz     Syncope     episode August 2017, possible vasovagal     Urethral strictures      Ventricular fibrillation (H) 12/15    during mi     Vitamin D deficiency              Past Surgical History:      Past Surgical History:   Procedure Laterality Date     C LIGATE FALLOPIAN TUBE,POSTPARTUM  85    Tubal Ligation     HC REMOVAL OF OVARY/TUBE(S)  81    Salpingo-Oophorectomy, Unilateral(endometriosis)     LAPAROTOMY EXPLORATORY  80    due to endomet, removed ovary     REPAIR PTOSIS BILATERAL  2/7/2012    Procedure:REPAIR PTOSIS BILATERAL; BILATERAL UPPER LID MECHANICAL PTOSIS REPAIR,and ptosis  repair; Surgeon:JOVANA BETANCUR; Location:[facility]             Social History:     Social History     Social History     Marital status:      Spouse name: N/A     Number of children: 1     Years of education: N/A     Occupational History     rn, retired 2015 [employer],[address]     Social History Main Topics     Smoking status: Former Smoker     Quit date: 1/28/1982     Smokeless tobacco: Never Used      Comment: quit 23 years ago     Alcohol use No     Drug use: No     Sexual activity: Yes     Partners: Male     Other Topics Concern     Special Diet No     heart healthy, weight watches      Exercise No     Social History Narrative             Family History:   reviewed         Allergies:     Allergies   Allergen Reactions     Crestor [Rosuvastatin]      Muscle aches, debilitating     Codeine Nausea and Vomiting     Shrimp Nausea and Vomiting             Medications:     Current Outpatient Prescriptions   Medication Sig Dispense Refill     alirocumab (PRALUENT) 75 MG/ML injectable pen Inject 1 mL (75 mg) Subcutaneous every 14 days (Patient not taking: Reported on 8/31/2018) 2 mL 9     aspirin 81 MG EC tablet Take 1 tablet (81 mg) by mouth daily 90 tablet 3     carvedilol (COREG) 3.125 MG tablet Take 3.125 mg by mouth 2 times daily (with meals)       Cholecalciferol (VITAMIN D3 PO) Take 5,000 Units by mouth daily       nitroglycerin (NITROSTAT) 0.4 MG SL tablet Place 1 tablet (0.4 mg) under the tongue every 5 minutes as needed for chest pain If you are still having symptoms after 3 doses (15 minutes) call 911. 25 tablet 0     omeprazole (PRILOSEC) 20 MG capsule Take 20 mg by mouth daily.       STATIN NOT PRESCRIBED, INTENTIONAL, Please choose reason not prescribed, below                 Review of Systems:   The 10 point Review of Systems is negative other than noted in the HPI           Physical Exam:   Blood pressure 96/63, pulse 50, temperature 97.4  F (36.3  C), temperature source  "Oral, height 5' 5\" (1.651 m), weight 148 lb (67.1 kg), SpO2 99 %, not currently breastfeeding.    Exam:  Constitutional: healthy appearing, alert and in no distress  Heent: Normocephalic. Head without obvious masses or lesions. PERRLDC, EOMI. Mouth exam within normal limits: tongue, mucous membranes, posterior pharynx all normal, no lesions or abnormalities seen.  Tm's and canals within normal limits bilaterally. Neck supple, no nuchal rigidity or masses. No supraclavicular, or cervical adenopathy. Thyroid symmetric, no masses.  Cardiovascular: Regular rate and rhythm, no murmer, rub or gallops.  JVP not elevated, no edema.  Carotids within normal limits bilaterally, no bruits.  Respiratory: Normal respiratory effort.  Lungs clear, normal flow, no wheezing or crackles.  Breasts: Normal bilaterally.  No masses or lesions.  Nipples within normal limits.  No axillary lesions or nodes.  My M.A. Was present during this part of the examination.  Gastrointestinal: Normal active bowel sounds.   Soft, not tender, no masses, guarding or rebound.  No hepatosplenomegaly.   Musculoskeletal: extremities normal, no gross deformities noted.  Skin: no suspicious lesions or rashes   Neurologic: Mental status within normal limits.  Speech fluent.  No gross motor abnormalities and gait intact.  Psychiatric: mentation appears normal and affect normal.         Data:   Labs sent, off cholesterol med for 1 month        Assessment:   1. Normal complete physical exam  2. Ascvd, stable, med mgmt  3. Vfib, due to mi, no recurrence  4. Elevated cholesterol, statin intol, she will follow up with cards for this  5. Recurrent uti, follow up gyn  6. Osteopenia, to get dexa  7. Vit d defic, low iron, follow up labs  8. Gerd, no issues  9. Elevated sugar, follow up labs  10. Colon polyp, up to date on follow up  11. hcm         Plan:   shingrix at pharm  Exercise, dietl  Letter with labs  Follow up cards re milagros Tejada, " MAfricaD.              Are you in the first 12 months of your Medicare Part B coverage?  No    Healthy Habits:    Do you get at least three servings of calcium containing foods daily (dairy, green leafy vegetables, etc.)? yes    Amount of exercise or daily activities, outside of work: 3-5 day(s) per week    Problems taking medications regularly No    Medication side effects: Yes muscle aches and weakness    Have you had an eye exam in the past two years? yes    Do you see a dentist twice per year? yes    Do you have sleep apnea, excessive snoring or daytime drowsiness?no      Ability to successfully perform activities of daily living: Yes, no assistance needed    Home safety:  none identified     Hearing impairment: No    Fall risk:       click delete button to remove this line now    COGNITIVE SCREEN  1) Repeat 3 items (Leader, Season, Table)    2) Clock draw: NORMAL  3) 3 item recall: Recalls 1 object   Results: NORMAL clock, 1-2 items recalled: COGNITIVE IMPAIRMENT LESS LIKELY    Mini-CogTM Copyright SINCERE Dubois. Licensed by the author for use in [facility]; reprinted with permission (queta@[facility]). All rights reserved.                Reviewed and updated as needed this visit by clinical staff  Allergies  Meds         Reviewed and updated as needed this visit by Provider        Social History   Substance Use Topics     Smoking status: Former Smoker     Quit date: 1/28/1982     Smokeless tobacco: Never Used      Comment: quit 23 years ago     Alcohol use No       If you drink alcohol do you typically have >3 drinks per day or >7 drinks per week? No                        Today's PHQ-2 Score:   PHQ-2 ( 1999 Pfizer) 8/25/2017 11/8/2016   Q1: Little interest or pleasure in doing things 0 0   Q2: Feeling down, depressed or hopeless 0 0   PHQ-2 Score 0 0       Do you feel safe in your environment - Yes    Do you have a Health Care Directive?: Yes: Patient states has Advance Directive and will bring in a copy to  "clinic.    Current providers sharing in care for this patient include:   Patient Care Team:  Jesse Tejada MD as PCP - General    The following health maintenance items are reviewed in Epic and correct as of today:  Health Maintenance   Topic Date Due     FALL RISK ASSESSMENT  07/14/2017     ADVANCE DIRECTIVE PLANNING Q5 YRS  12/17/2017     PHQ-2 Q1 YR  08/25/2018     INFLUENZA VACCINE (1) 09/01/2018     MAMMO SCREEN Q2 YR (SYSTEM ASSIGNED)  08/25/2019     COLON CANCER SCREEN (SYSTEM ASSIGNED)  03/18/2021     LIPID SCREEN Q5 YR FEMALE (SYSTEM ASSIGNED)  08/25/2022     TETANUS IMMUNIZATION (SYSTEM ASSIGNED)  12/17/2022     DEXA SCAN SCREENING (SYSTEM ASSIGNED)  Completed     PNEUMOCOCCAL  Completed     HEPATITIS C SCREENING  Completed       End of Life Planning:  Patient currently has an advanced directive: yes    COUNSELING:  Reviewed preventive health counseling, as reflected in patient instructions       Regular exercise       Healthy diet/nutrition    BP Readings from Last 1 Encounters:   12/14/17 117/76     Estimated body mass index is 24.71 kg/(m^2) as calculated from the following:    Height as of 12/14/17: 5' 5.12\" (1.654 m).    Weight as of 12/14/17: 149 lb (67.6 kg).           reports that she quit smoking about 36 years ago. She has never used smokeless tobacco.      Appropriate preventive services were discussed with this patient, including applicable screening as appropriate for cardiovascular disease, diabetes, osteopenia/osteoporosis, and glaucoma.  As appropriate for age/gender, discussed screening for colorectal cancer, prostate cancer, breast cancer, and cervical cancer. Checklist reviewing preventive services available has been given to the patient.    Reviewed patients plan of care and provided an AVS. The Basic Care Plan (routine screening as documented in Health Maintenance) for Madonna meets the Care Plan requirement. This Care Plan has been established and reviewed with the " Patient.    Counseling Resources:  ATP IV Guidelines  Pooled Cohorts Equation Calculator  Breast Cancer Risk Calculator  FRAX Risk Assessment  ICSI Preventive Guidelines  Dietary Guidelines for Americans, 2010  [employer]'s MyPlate  ASA Prophylaxis  Lung CA Screening    Jesse Tejada MD  [facility]

## 2018-08-31 NOTE — LETTER
[facility]  [address] [facility]  [address]  [place]  [address]  Tel: [phone]    September 4, 2018    Madonna Duque  [address] [facility] [address]        Dear Ms. Duque,    It was a pleasure seeing you for your physical examination.  I wanted to get back to you with your test results.  I have enclosed a copy for your review.      I am happy to report that your cbc or complete blood count is normal with no signs of anemia, leukemia or platelet abnormalities. Your chemistry panel shows a slightly elevated blood sugar and hemoglobin A1c.  You do not have diabetes but certainly are at risk of developing it.  The best way to avoid this is through regular exercise, diet and keeping your weight down.  We should check this yearly.  Your blood salts, kidney tests, liver tests, thyroid test, iron studies, and proteins are all fine.    Your total cholesterol is not good at 251 with the normal range being below 200.  Your HDL or good cholesterol is 56 with the normal range being above 50.  Your LDL or bad cholesterol is 172 with the normal range being below 100.  These numbers are much too high.  Please contact cardiology asap and let's see what they want to do.    I am happy to bring you this overall excellent report.  Overall your health is good but please be sure to exercise, eat a healthy diet and speak with cardiology about the lipids.   If you have any questions please call me.        Sincerely,    Jesse Tejada MD / geovany          Enclosure: Lab Results          Resulted Orders   CBC with platelets   Result Value Ref Range    WBC 4.9 4.0 - 11.0 10e9/L    RBC Count 4.47 3.8 - 5.2 10e12/L    Hemoglobin 13.1 11.7 - 15.7 g/dL    Hematocrit 40.1 35.0 - 47.0 %    MCV 90 78 - 100 fl    MCH 29.3 26.5 - 33.0 pg    MCHC 32.7 31.5 - 36.5 g/dL    RDW 14.0 10.0 - 15.0 %    Platelet Count 244 150 - 450 10e9/L   Comprehensive metabolic panel   Result Value Ref Range    Sodium 142 133 - 144 mmol/L     Potassium 4.1 3.4 - 5.3 mmol/L    Chloride 107 94 - 109 mmol/L    Carbon Dioxide 26 20 - 32 mmol/L    Anion Gap 9 3 - 14 mmol/L    Glucose 102 (H) 70 - 99 mg/dL      Comment:      Fasting specimen    Urea Nitrogen 17 7 - 30 mg/dL    Creatinine 0.65 0.52 - 1.04 mg/dL    GFR Estimate >90 >60 mL/min/1.7m2      Comment:      Non  GFR Calc    GFR Estimate If Black >90 >60 mL/min/1.7m2      Comment:       GFR Calc    Calcium 9.0 8.5 - 10.1 mg/dL    Bilirubin Total 0.7 0.2 - 1.3 mg/dL    Albumin 4.0 3.4 - 5.0 g/dL    Protein Total 7.2 6.8 - 8.8 g/dL    Alkaline Phosphatase 103 40 - 150 U/L    ALT 24 0 - 50 U/L    AST 24 0 - 45 U/L   Lipid panel reflex to direct LDL Non-fasting   Result Value Ref Range    Cholesterol 251 (H) <200 mg/dL      Comment:      Desirable:       <200 mg/dl    Triglycerides 115 <150 mg/dL      Comment:      Fasting specimen    HDL Cholesterol 56 >49 mg/dL    LDL Cholesterol Calculated 172 (H) <100 mg/dL      Comment:      Above desirable:  100-129 mg/dl  Borderline High:  130-159 mg/dL  High:             160-189 mg/dL  Very high:       >189 mg/dl      Non HDL Cholesterol 195 (H) <130 mg/dL      Comment:      Above Desirable:  130-159 mg/dl  Borderline high:  160-189 mg/dl  High:             190-219 mg/dl  Very high:       >219 mg/dl     Ferritin   Result Value Ref Range    Ferritin 25 8 - 252 ng/mL   Iron and iron binding capacity   Result Value Ref Range    Iron 110 35 - 180 ug/dL    Iron Binding Cap 392 240 - 430 ug/dL    Iron Saturation Index 28 15 - 46 %   TSH with free T4 reflex   Result Value Ref Range    TSH 0.93 0.40 - 4.00 mU/L   Hemoglobin A1c   Result Value Ref Range    Hemoglobin A1C 5.8 (H) 0 - 5.6 %      Comment:      Normal <5.7% Prediabetes 5.7-6.4%  Diabetes 6.5% or higher - adopted from ADA   consensus guidelines.

## 2018-08-31 NOTE — MR AVS SNAPSHOT
After Visit Summary   8/31/2018    Madonna Duque    MRN: [id number]           Patient Information     Date Of Birth          1950        Visit Information        Provider Department      8/31/2018 9:00 AM Jesse Tejada MD [facility]        Today's Diagnoses     Routine general medical examination at a health care facility    -  1    Coronary artery disease involving native coronary artery of native heart without angina pectoris        Ventricular fibrillation (H)        Vitamin D deficiency        Mixed hyperlipidemia        Other iron deficiency anemia        Polyp of colon, unspecified part of colon, unspecified type        Osteopenia, unspecified location        Left ventricular diastolic dysfunction        Elevated blood sugar        Gastroesophageal reflux disease without esophagitis          Care Instructions    I would recommend getting the new shingles shot called shingrix, but I would do it at your pharmacy as they can check with the insurance company to see if it is paid for.    Jesse Tejada M.D.              Preventive Health Recommendations    Female Ages 65 +    Yearly exam:     See your health care provider every year in order to  o Review health changes.   o Discuss preventive care.    o Review your medicines if your doctor has prescribed any.      You no longer need a yearly Pap test unless you've had an abnormal Pap test in the past 10 years. If you have vaginal symptoms, such as bleeding or discharge, be sure to talk with your provider about a Pap test.      Every 1 to 2 years, have a mammogram.  If you are over 69, talk with your health care provider about whether or not you want to continue having screening mammograms.      Every 10 years, have a colonoscopy. Or, have a yearly FIT test (stool test). These exams will check for colon cancer.       Have a cholesterol test every 5 years, or more often if your doctor advises it.       Have a diabetes  test (fasting glucose) every three years. If you are at risk for diabetes, you should have this test more often.       At age 65, have a bone density scan (DEXA) to check for osteoporosis (brittle bone disease).    Shots:    Get a flu shot each year.    Get a tetanus shot every 10 years.    Talk to your doctor about your pneumonia vaccines. There are now two you should receive - Pneumovax (PPSV 23) and Prevnar (PCV 13).    Talk to your pharmacist about the shingles vaccine.    Talk to your doctor about the hepatitis B vaccine.    Nutrition:     Eat at least 5 servings of fruits and vegetables each day.      Eat whole-grain bread, whole-wheat pasta and brown rice instead of white grains and rice.      Get adequate Calcium and Vitamin D.     Lifestyle    Exercise at least 150 minutes a week (30 minutes a day, 5 days a week). This will help you control your weight and prevent disease.      Limit alcohol to one drink per day.      No smoking.       Wear sunscreen to prevent skin cancer.       See your dentist twice a year for an exam and cleaning.      See your eye doctor every 1 to 2 years to screen for conditions such as glaucoma, macular degeneration and cataracts.          Follow-ups after your visit        Your next 10 appointments already scheduled     Aug 31, 2018  9:00 AM CDT   PHYSICAL with [facility]jada MD   [facility]    [address] [place] [id number] [phone]            Sep 06, 2018  7:00 AM CDT   MA SCREENING DIGITAL BILATERAL with SHBCMA2   [facility] ([facility])    [address] [place] [address] [phone]           Do not use any powder, lotion or deodorant under your arms or on your breast. If you do, we will ask you to remove it before your exam.  Wear comfortable, two-piece clothing.  If you have any allergies, tell your care team.  Bring any previous mammograms from  "other facilities or have them mailed to the breast center. Three-dimensional (3D) mammograms are available at [place] locations in [facility], [place], [place], [facility], [place], [place], and [place]. -[facility] locations include [place] and [facility] in [place]. Benefits of 3D mammograms include: - Improved rate of cancer detection - Decreases your chance of having to go back for more tests, which means fewer: - \"False-positive\" results (This means that there is an abnormal area but it isn't cancer.) - Invasive testing procedures, such as a biopsy or surgery - Can provide clearer images of the breast if you have dense breast tissue. 3D mammography is an optional exam that anyone can have with a 2D mammogram. It doesn't replace or take the place of a 2D mammogram. 2D mammograms remain an effective screening test for all women.  Not all insurance companies cover the cost of a 3D mammogram. Check with your insurance.              Future tests that were ordered for you today     Open Future Orders        Priority Expected Expires Ordered    DX Hip/Pelvis/Spine Routine 8/31/2018 8/31/2019 8/31/2018            Who to contact     If you have questions or need follow up information about today's clinic visit or your schedule please contact [facility] directly at [phone].  Normal or non-critical lab and imaging results will be communicated to you by MyChart, letter or phone within 4 business days after the clinic has received the results. If you do not hear from us within 7 days, please contact the clinic through MyChart or phone. If you have a critical or abnormal lab result, we will notify you by phone as soon as possible.  Submit refill requests through [facility] or call your pharmacy and they will forward the refill request to us. Please allow 3 business days for your refill to be completed.          Additional Information About Your Visit        Care EveryWhere ID  " "   This is your Care EveryWhere ID. This could be used by other organizations to access your [place] medical records  [id number]        Your Vitals Were     Pulse Temperature Height Pulse Oximetry BMI (Body Mass Index)       50 97.4  F (36.3  C) (Oral) 5' 5\" (1.651 m) 99% 24.63 kg/m2        Blood Pressure from Last 3 Encounters:   08/31/18 96/63   12/14/17 117/76   11/17/17 109/71    Weight from Last 3 Encounters:   08/31/18 148 lb (67.1 kg)   12/14/17 149 lb (67.6 kg)   11/17/17 149 lb 12.8 oz (67.9 kg)              We Performed the Following     CBC with platelets     Comprehensive metabolic panel     Ferritin     Hemoglobin A1c     Iron and iron binding capacity     Lipid panel reflex to direct LDL Non-fasting     TSH with free T4 reflex     Vitamin D Deficiency        Primary Care Provider Office Phone # Fax #    Jesse Eduardo Tejada [phone] [address]  [facility] [address]        Equal Access to Services     [facility]: Hadii aad ku hadasho Soomaali, waaxda luqadaha, qaybta kaalmada adeegyada, waxay lauren haybridgett osorio . So [facility] [phone].    ATENCIÓN: Si habla español, tiene a miller disposición servicios gratuitos de asistencia lingüística. Howardame al [phone].    We comply with applicable federal civil rights laws and [place] laws. We do not discriminate on the basis of race, color, national origin, age, disability, sex, sexual orientation, or gender identity.            Thank you!     Thank you for choosing [facility]  for your care. Our goal is always to provide you with excellent care. Hearing back from our patients is one way we can continue to improve our services. Please take a few minutes to complete the written survey that you may receive in the mail after your visit with us. Thank you!             Your Updated Medication List - Protect others around you: Learn how to safely use, store and throw away your medicines at " www.disposemymeds.org.          This list is accurate as of 8/31/18  8:45 AM.  Always use your most recent med list.                   Brand Name Dispense Instructions for use Diagnosis    alirocumab 75 MG/ML injectable pen    PRALUENT    2 mL    Inject 1 mL (75 mg) Subcutaneous every 14 days    Mixed hyperlipidemia, Atherosclerosis of native coronary artery of native heart without angina pectoris, History of ST elevation myocardial infarction (STEMI)       aspirin 81 MG EC tablet     90 tablet    Take 1 tablet (81 mg) by mouth daily        carvedilol 3.125 MG tablet    COREG     Take 3.125 mg by mouth 2 times daily (with meals)        nitroGLYcerin 0.4 MG sublingual tablet    NITROSTAT    25 tablet    Place 1 tablet (0.4 mg) under the tongue every 5 minutes as needed for chest pain If you are still having symptoms after 3 doses (15 minutes) call 911.        priLOSEC 20 MG CR capsule   Generic drug:  omeprazole      Take 20 mg by mouth daily.        STATIN NOT PRESCRIBED (INTENTIONAL)      Please choose reason not prescribed, below    ST elevation myocardial infarction involving left anterior descending (LAD) coronary artery (H)       VITAMIN D3 PO      Take 5,000 Units by mouth daily    Routine general medical examination at a health care facility

## 2018-09-01 LAB
ALBUMIN SERPL-MCNC: 4 G/DL (ref 3.4–5)
ALP SERPL-CCNC: 103 U/L (ref 40–150)
ALT SERPL W P-5'-P-CCNC: 24 U/L (ref 0–50)
ANION GAP SERPL CALCULATED.3IONS-SCNC: 9 MMOL/L (ref 3–14)
AST SERPL W P-5'-P-CCNC: 24 U/L (ref 0–45)
BILIRUB SERPL-MCNC: 0.7 MG/DL (ref 0.2–1.3)
BUN SERPL-MCNC: 17 MG/DL (ref 7–30)
CALCIUM SERPL-MCNC: 9 MG/DL (ref 8.5–10.1)
CHLORIDE SERPL-SCNC: 107 MMOL/L (ref 94–109)
CHOLEST SERPL-MCNC: 251 MG/DL
CO2 SERPL-SCNC: 26 MMOL/L (ref 20–32)
CREAT SERPL-MCNC: 0.65 MG/DL (ref 0.52–1.04)
FERRITIN SERPL-MCNC: 25 NG/ML (ref 8–252)
GFR SERPL CREATININE-BSD FRML MDRD: >90 ML/MIN/1.7M2
GLUCOSE SERPL-MCNC: 102 MG/DL (ref 70–99)
HDLC SERPL-MCNC: 56 MG/DL
IRON SATN MFR SERPL: 28 % (ref 15–46)
IRON SERPL-MCNC: 110 UG/DL (ref 35–180)
LDLC SERPL CALC-MCNC: 172 MG/DL
NONHDLC SERPL-MCNC: 195 MG/DL
POTASSIUM SERPL-SCNC: 4.1 MMOL/L (ref 3.4–5.3)
PROT SERPL-MCNC: 7.2 G/DL (ref 6.8–8.8)
SODIUM SERPL-SCNC: 142 MMOL/L (ref 133–144)
TIBC SERPL-MCNC: 392 UG/DL (ref 240–430)
TRIGL SERPL-MCNC: 115 MG/DL
TSH SERPL DL<=0.005 MIU/L-ACNC: 0.93 MU/L (ref 0.4–4)

## 2018-09-04 LAB — DEPRECATED CALCIDIOL+CALCIFEROL SERPL-MC: 90 UG/L (ref 20–75)

## 2018-09-04 NOTE — PROGRESS NOTES
It was a pleasure seeing you for your physical examination.  I wanted to get back to you with your test results.  I have enclosed a copy for your review.      I am happy to report that your cbc or complete blood count is normal with no signs of anemia, leukemia or platelet abnormalities. Your chemistry panel shows a slightly elevated blood sugar and hemoglobin A1c.  You do not have diabetes but certainly are at risk of developing it.  The best way to avoid this is through regular exercise, diet and keeping your weight down.  We should check this yearly.  Your blood salts, kidney tests, liver tests, thyroid test, iron studies, and proteins are all fine.    Your total cholesterol is not good at 251 with the normal range being below 200.  Your HDL or good cholesterol is 56 with the normal range being above 50.  Your LDL or bad cholesterol is 172 with the normal range being below 100.  These numbers are much too high.  Please contact cardiology asap and let's see what they want to do.    I am happy to bring you this overall excellent report.  Overall your health is good but please be sure to exercise, eat a healthy diet and speak with cardiology about the lipids.   If you have any questions please call me.

## 2018-09-07 ENCOUNTER — HOSPITAL ENCOUNTER (OUTPATIENT)
Dept: MAMMOGRAPHY | Facility: CLINIC | Age: 68
End: 2018-09-07
Attending: INTERNAL MEDICINE
Payer: MEDICARE

## 2018-09-07 DIAGNOSIS — Z12.31 VISIT FOR SCREENING MAMMOGRAM: ICD-10-CM

## 2018-09-07 PROCEDURE — 77067 SCR MAMMO BI INCL CAD: CPT

## 2018-09-17 ENCOUNTER — TELEPHONE (OUTPATIENT)
Dept: CARDIOLOGY | Facility: CLINIC | Age: 68
End: 2018-09-17

## 2018-09-17 DIAGNOSIS — E78.2 MIXED HYPERLIPIDEMIA: Primary | ICD-10-CM

## 2018-09-17 NOTE — TELEPHONE ENCOUNTER
Patient called with update, She has been off the Praulent since 7-24-18 due to statin side effects of muscle aches and weakness.  Side effects resolved shortly after stop[ping the medication  Patient had Lipids done 8-31-18 by PMD and Chol and LDL elevated.  Patient states she is intolerant to ALL statins due to side effects and wondering what she should do next?  Will message Dr. Bennett.

## 2018-09-18 NOTE — TELEPHONE ENCOUNTER
Contacted patient to review Dr. Bennett's recommendations. Patient is agreeable to plan. Patient connected with scheduling to set up OV with Agata to discuss zetia.

## 2018-10-04 ENCOUNTER — OFFICE VISIT (OUTPATIENT)
Dept: CARDIOLOGY | Facility: CLINIC | Age: 68
End: 2018-10-04
Attending: INTERNAL MEDICINE
Payer: COMMERCIAL

## 2018-10-04 VITALS
DIASTOLIC BLOOD PRESSURE: 74 MMHG | SYSTOLIC BLOOD PRESSURE: 113 MMHG | WEIGHT: 150 LBS | HEART RATE: 72 BPM | HEIGHT: 65 IN | BODY MASS INDEX: 24.99 KG/M2

## 2018-10-04 DIAGNOSIS — E78.2 MIXED HYPERLIPIDEMIA: ICD-10-CM

## 2018-10-04 DIAGNOSIS — I25.10 CORONARY ARTERY DISEASE INVOLVING NATIVE CORONARY ARTERY OF NATIVE HEART WITHOUT ANGINA PECTORIS: Primary | ICD-10-CM

## 2018-10-04 PROCEDURE — 99214 OFFICE O/P EST MOD 30 MIN: CPT | Performed by: NURSE PRACTITIONER

## 2018-10-04 RX ORDER — EZETIMIBE 10 MG/1
10 TABLET ORAL DAILY
Qty: 90 TABLET | Refills: 3 | Status: SHIPPED | OUTPATIENT
Start: 2018-10-04 | End: 2018-12-14

## 2018-10-04 NOTE — LETTER
10/4/2018    Jesse Tejada MD  [address]  [facility] [address]    RE: Madonna Duque       Dear Colleague,    I had the pleasure of seeing Madonna Duque in the [facility].    HPI and Plan:   I had the pleasure of seeing Madonna Duque today in cardiology clinic follow up for lipid management. She is a pleasant 67 year old patient of Dr. Bennett.    Ms. Duque has a history of coronary artery disease and known hyperlipidemia dating back to her childhood.  In December 2015 well vacationing in [place] she had a ST elevation MI with V. fib arrest.  2 drug-eluting stents were placed in the mid and proximal LAD which is 90% ulcerative plaque.  The STEMI occurred in the setting of hypokalemia and dehydration.  She has statin intolerances and was part of a Praulent study, she was on the medication for 2 years and had excellent lipid control but this summer she developed similar symptoms of statin intolerance, she describes having muscle aches in her thighs and lower legs and the  symptoms progressed to weakness and the sensation of collapse.  She subsequently stopped her Praulent  at Dr. Bennett's recommendation and her symptoms resolved.  She is an active person, she takes care of her grandchildren at times and she likes to walk with friends and exercise, the symptoms with the medication prevent her from doing that and she would rather not be confined because she has to take the medication.  Unfortunately her lipids today show that her cholesterol has returned to its baseline.  Her LDL went from 81 up to 172 and her total cholesterol went from 173 up to 251.  Her HDL and triglycerides have remained stable at 56, and 115 respectively.    Physical Exam  Please see Below     Assessment and Plan  1. Hyperlipdemia, familial. Intorlerant to statins. Developed statin like intolerances on praulent after being on it for two years. Her symptoms included pain in the thigh and  lower leg that progressed to weakness and collapse. Symptoms resovled off Praulent. Will trial zetia 10 mg. Stronly recommed diet and exercise. Follow up in December with lipids prior.  2.  Coronary artery disease.  Status post ST elevation MI and V. fib arrest in 2015.  She has not had any ischemic symptoms.  She continues on medical management with aspirin and beta blockade.  As previously discussed she has a statin intolerance not only to oral medications but also not to injectables, we will trial of Zetia and follow-up in the winter.    Thank you for allowing me to care for Madonna Duque today.    AARON Gomez, CNP  Cardiology    Voice recognition software was used for this note, I have reviewed this note, but errors may have been missed.    No orders of the defined types were placed in this encounter.    Orders Placed This Encounter   Medications     ezetimibe (ZETIA) 10 MG tablet     Sig: Take 1 tablet (10 mg) by mouth daily     Dispense:  90 tablet     Refill:  3     There are no discontinued medications.      CURRENT MEDICATIONS:  Current Outpatient Prescriptions   Medication Sig Dispense Refill     aspirin 81 MG EC tablet Take 1 tablet (81 mg) by mouth daily 90 tablet 3     carvedilol (COREG) 3.125 MG tablet Take 3.125 mg by mouth 2 times daily (with meals)       Cholecalciferol (VITAMIN D3 PO) Take 5,000 Units by mouth daily       ezetimibe (ZETIA) 10 MG tablet Take 1 tablet (10 mg) by mouth daily 90 tablet 3     nitroglycerin (NITROSTAT) 0.4 MG SL tablet Place 1 tablet (0.4 mg) under the tongue every 5 minutes as needed for chest pain If you are still having symptoms after 3 doses (15 minutes) call 911. 25 tablet 0     omeprazole (PRILOSEC) 20 MG capsule Take 20 mg by mouth daily.       alirocumab (PRALUENT) 75 MG/ML injectable pen Inject 1 mL (75 mg) Subcutaneous every 14 days (Patient not taking: Reported on 8/31/2018) 2 mL 9     STATIN NOT PRESCRIBED, INTENTIONAL, Please choose reason  not prescribed, below         ALLERGIES     Allergies   Allergen Reactions     Crestor [Rosuvastatin]      Muscle aches, debilitating     Codeine Nausea and Vomiting     Shrimp Nausea and Vomiting       PAST MEDICAL HISTORY:  Past Medical History:   Diagnosis Date     Abdominal discomfort 12/13    ct abd and pelvis no cause found     Chest pain 2/14    neg est echo     Colonic polyp 2005    fu 2011 nl and to fu 2021     Coronary artery disease involving native coronary artery of native heart without angina pectoris      Dizzy 2009    mri brain nl     DJD (degenerative joint disease)     neck     Elevated blood sugar      GERD (gastroesophageal reflux disease) 2007    egd, benign stricture     Hypercholesteremia 2010    crestor intolerant, lipitor intolerant     CORINA (iron deficiency anaemia) 2008    ugi and sbft nl, be nl     Left ventricular diastolic dysfunction 1/16    echo done for fu and nl ef, no wma     Osteopenia     fu dexa 2011 -1.9 fem neck and -1.2 spine, stable c/w 2007     PONV (postoperative nausea and vomiting)      STEMI (ST elevation myocardial infarction) (H) 12/2015    in [place] on cruise, lytics then ptca of lad, 2 stents, had vfib arrest, ef on angio 67%, 40% rca, no other dz; fu est echo 12/16 nl     Syncope     episode August 2017, possible vasovagal     Urethral strictures      Ventricular fibrillation (H) 12/15    during mi     Vitamin D deficiency        PAST SURGICAL HISTORY:  Past Surgical History:   Procedure Laterality Date     C LIGATE FALLOPIAN TUBE,POSTPARTUM  84    Tubal Ligation     HC REMOVAL OF OVARY/TUBE(S)  81    Salpingo-Oophorectomy, Unilateral(endometriosis)     LAPAROTOMY EXPLORATORY  79    due to endomet, removed ovary     REPAIR PTOSIS BILATERAL  2/7/2012    Procedure:REPAIR PTOSIS BILATERAL; BILATERAL UPPER LID MECHANICAL PTOSIS REPAIR,and ptosis repair; Surgeon:JOVANA BETANCUR; Location:[facility]       FAMILY HISTORY:  Family History   Problem Relation Age of Onset      "Diabetes Father      Cancer Father      [place]     Cancer Mother      CLL     Diabetes Paternal Aunt      Diabetes Paternal Aunt      Diabetes Paternal Aunt      Diabetes Paternal Uncle      Diabetes Paternal Uncle      Diabetes Paternal Uncle      Breast Cancer Paternal Aunt      Cancer - colorectal Paternal Aunt      Cancer - colorectal Paternal Aunt      Cancer Paternal Uncle      bladder       SOCIAL HISTORY:  Social History     Social History     Marital status:      Spouse name: N/A     Number of children: 1     Years of education: N/A     Occupational History     rn, retired 2015 [employer],[address]     Social History Main Topics     Smoking status: Former Smoker     Quit date: 1/28/1982     Smokeless tobacco: Never Used      Comment: quit 23 years ago     Alcohol use No     Drug use: No     Sexual activity: Yes     Partners: Male     Other Topics Concern     Special Diet No     heart healthy, weight watches      Exercise No     Social History Narrative       Review of Systems:  Skin:  Negative       Eyes:  Positive for glasses    ENT:  Negative      Respiratory:  Negative       Cardiovascular:  Negative for;palpitations;lightheadedness   upon standing (chronic r/t low BP per pt)   Gastroenterology: Positive for heartburn;reflux    Genitourinary:  Negative      Musculoskeletal:  Positive for   L hip spurs  Neurologic:  Negative      Psychiatric:  Positive for   worked NOC shift for years, occ issues sleeping   Heme/Lymph/Imm:  Positive for allergies    Endocrine:  Negative        Physical Exam:  Vitals: /74  Pulse 72  Ht 1.651 m (5' 5\")  Wt 68 kg (150 lb)  BMI 24.96 kg/m2    Constitutional:  cooperative        Skin:  warm and dry to the touch          Head:  normocephalic        Eyes:  pupils equal and round        Lymph:      ENT:  dentition good        Neck:  JVP normal        Respiratory:  clear to auscultation;normal symmetry         Cardiac: regular " rhythm;normal S1 and S2                                                         GI:           Extremities and Muscular Skeletal:  no edema              Neurological:  affect appropriate        Psych:  Alert and Oriented x 3    Encounter Diagnoses   Name Primary?     Mixed hyperlipidemia      Coronary artery disease involving native coronary artery of native heart without angina pectoris Yes       Recent Lab Results:  LIPID RESULTS:  Lab Results   Component Value Date    CHOL 251 (H) 08/31/2018    HDL 56 08/31/2018     (H) 08/31/2018    TRIG 115 08/31/2018    CHOLHDLRATIO 4.6 06/01/2015       LIVER ENZYME RESULTS:  Lab Results   Component Value Date    AST 24 08/31/2018    ALT 24 08/31/2018       CBC RESULTS:  Lab Results   Component Value Date    WBC 4.9 08/31/2018    RBC 4.47 08/31/2018    HGB 13.1 08/31/2018    HCT 40.1 08/31/2018    MCV 90 08/31/2018    MCH 29.3 08/31/2018    MCHC 32.7 08/31/2018    RDW 14.0 08/31/2018     08/31/2018       BMP RESULTS:  Lab Results   Component Value Date     08/31/2018    POTASSIUM 4.1 08/31/2018    CHLORIDE 107 08/31/2018    CO2 26 08/31/2018    ANIONGAP 9 08/31/2018     (H) 08/31/2018    BUN 17 08/31/2018    CR 0.65 08/31/2018    GFRESTIMATED >90 08/31/2018    GFRESTBLACK >90 08/31/2018    SHOAIB 9.0 08/31/2018        A1C RESULTS:  Lab Results   Component Value Date    A1C 5.8 (H) 08/31/2018       INR RESULTS:  No results found for: INR      Thank you for allowing me to participate in the care of your patient.    Sincerely,     AARON Escobar [facility]

## 2018-10-04 NOTE — MR AVS SNAPSHOT
After Visit Summary   10/4/2018    Madonna Duque    MRN: [id number]           Patient Information     Date Of Birth          1950        Visit Information        Provider Department      10/4/2018 3:50 PM Monika Enriquez APRN CNP [facility]        Today's Diagnoses     Coronary artery disease involving native coronary artery of native heart without angina pectoris    -  1    Mixed hyperlipidemia           Follow-ups after your visit        Your next 10 appointments already scheduled     Dec 14, 2018  7:30 AM CST   LAB with [facility]   [facility] ([facility])    [address] [place] [id number]   [phone]           Please do not eat 10-12 hours before your appointment if you are coming in fasting for labs on lipids, cholesterol, or glucose (sugar). This does not apply to pregnant women. Water, hot tea and black coffee (with nothing added) are okay. Do not drink other fluids, diet soda or chew gum.            Dec 14, 2018  8:30 AM CST   Return Visit with AARON Puckett CNP   [facility] ([facility])    [address] [place], [place] [id number]  [facility] [id number] [phone] OPT 2              Who to contact     If you have questions or need follow up information about today's clinic visit or your schedule please contact [facility] directly at [phone].  Normal or non-critical lab and imaging results will be communicated to you by MyChart, letter or phone within 4 business days after the clinic has received the results. If you do not hear from us within 7 days, please contact the clinic through MyChart or phone. If you have a critical or abnormal lab result, we will notify you by phone as soon as possible.  Submit refill requests through [employer] or call your  "pharmacy and they will forward the refill request to us. Please allow 3 business days for your refill to be completed.          Additional Information About Your Visit        Care EveryWhere ID     This is your Care EveryWhere ID. This could be used by other organizations to access your [facility] medical records  [id number]        Your Vitals Were     Pulse Height BMI (Body Mass Index)             72 1.651 m (5' 5\") 24.96 kg/m2          Blood Pressure from Last 3 Encounters:   10/04/18 113/74   08/31/18 96/63   12/14/17 117/76    Weight from Last 3 Encounters:   10/04/18 68 kg (150 lb)   08/31/18 67.1 kg (148 lb)   12/14/17 67.6 kg (149 lb)              We Performed the Following     Follow-Up with Cardiac Advanced Practice Provider          Today's Medication Changes          These changes are accurate as of 10/4/18 11:59 PM.  If you have any questions, ask your nurse or doctor.               Start taking these medicines.        Dose/Directions    ezetimibe 10 MG tablet   Commonly known as:  ZETIA   Used for:  Mixed hyperlipidemia   Started by:  Monika Enriquez APRN CNP        Dose:  10 mg   Take 1 tablet (10 mg) by mouth daily   Quantity:  90 tablet   Refills:  3         Stop taking these medicines if you haven't already. Please contact your care team if you have questions.     alirocumab 75 MG/ML injectable pen   Commonly known as:  PRALUENT   Stopped by:  Monika Enriquez APRN CNP                Where to get your medicines      These medications were sent to [facility] [place] - [address], [address]  [address], [address], [facility] [address]     Phone:  [phone]     ezetimibe 10 MG tablet                Primary Care Provider Office Phone # Fax #    Jsese Tejada [phone] [address]  [facility] [address]        Equal Access to Services     SORAIDA GARCIA AH: Hadii anselmo Roldan, nahomi jean, " hossein gravessandrinechris saucedamauriciostefanmikaela edmundomadelin polo. So [facility] [phone].    ATENCIÓN: Si vinny cosme, tiene a miller disposición servicios gratuitos de asistencia lingüística. Bridger al [phone].    We comply with applicable federal civil rights laws and [place] laws. We do not discriminate on the basis of race, color, national origin, age, disability, sex, sexual orientation, or gender identity.            Thank you!     Thank you for choosing [facility]  for your care. Our goal is always to provide you with excellent care. Hearing back from our patients is one way we can continue to improve our services. Please take a few minutes to complete the written survey that you may receive in the mail after your visit with us. Thank you!             Your Updated Medication List - Protect others around you: Learn how to safely use, store and throw away your medicines at www.disposemymeds.org.          This list is accurate as of 10/4/18 11:59 PM.  Always use your most recent med list.                   Brand Name Dispense Instructions for use Diagnosis    aspirin 81 MG EC tablet     90 tablet    Take 1 tablet (81 mg) by mouth daily        carvedilol 3.125 MG tablet    COREG     Take 3.125 mg by mouth 2 times daily (with meals)        ezetimibe 10 MG tablet    ZETIA    90 tablet    Take 1 tablet (10 mg) by mouth daily    Mixed hyperlipidemia       nitroGLYcerin 0.4 MG sublingual tablet    NITROSTAT    25 tablet    Place 1 tablet (0.4 mg) under the tongue every 5 minutes as needed for chest pain If you are still having symptoms after 3 doses (15 minutes) call 911.        priLOSEC 20 MG CR capsule   Generic drug:  omeprazole      Take 20 mg by mouth daily.        STATIN NOT PRESCRIBED (INTENTIONAL)      Please choose reason not prescribed, below    ST elevation myocardial infarction involving left anterior descending (LAD) coronary artery (H)       VITAMIN D3 PO       Take 5,000 Units by mouth daily    Routine general medical examination at a health care facility

## 2018-10-04 NOTE — PROGRESS NOTES
HPI and Plan:   I had the pleasure of seeing Madonna Duque today in cardiology clinic follow up for lipid management. She is a pleasant 67 year old patient of Dr. Bennett.    Ms. Duque has a history of coronary artery disease and known hyperlipidemia dating back to her childhood.  In December 2015 well vacationing in [place] she had a ST elevation MI with V. fib arrest.  2 drug-eluting stents were placed in the mid and proximal LAD which is 90% ulcerative plaque.  The STEMI occurred in the setting of hypokalemia and dehydration.  She has statin intolerances and was part of a Praulent study, she was on the medication for 2 years and had excellent lipid control but this summer she developed similar symptoms of statin intolerance, she describes having muscle aches in her thighs and lower legs and the  symptoms progressed to weakness and the sensation of collapse.  She subsequently stopped her Praulent  at Dr. Bennett's recommendation and her symptoms resolved.  She is an active person, she takes care of her grandchildren at times and she likes to walk with friends and exercise, the symptoms with the medication prevent her from doing that and she would rather not be confined because she has to take the medication.  Unfortunately her lipids today show that her cholesterol has returned to its baseline.  Her LDL went from 81 up to 172 and her total cholesterol went from 173 up to 251.  Her HDL and triglycerides have remained stable at 56, and 115 respectively.    Physical Exam  Please see Below     Assessment and Plan  1. Hyperlipdemia, familial. Intorlerant to statins. Developed statin like intolerances on praulent after being on it for two years. Her symptoms included pain in the thigh and lower leg that progressed to weakness and collapse. Symptoms resovled off Praulent. Will trial zetia 10 mg. Stronly recommed diet and exercise. Follow up in December with lipids prior.  2.  Coronary artery disease.  Status post ST  elevation MI and V. fib arrest in 2015.  She has not had any ischemic symptoms.  She continues on medical management with aspirin and beta blockade.  As previously discussed she has a statin intolerance not only to oral medications but also not to injectables, we will trial of Zetia and follow-up in the winter.    Thank you for allowing me to care for Madonna Duque today.    AARON Gomez, CNP  Cardiology    Voice recognition software was used for this note, I have reviewed this note, but errors may have been missed.    No orders of the defined types were placed in this encounter.    Orders Placed This Encounter   Medications     ezetimibe (ZETIA) 10 MG tablet     Sig: Take 1 tablet (10 mg) by mouth daily     Dispense:  90 tablet     Refill:  3     There are no discontinued medications.      CURRENT MEDICATIONS:  Current Outpatient Prescriptions   Medication Sig Dispense Refill     aspirin 81 MG EC tablet Take 1 tablet (81 mg) by mouth daily 90 tablet 3     carvedilol (COREG) 3.125 MG tablet Take 3.125 mg by mouth 2 times daily (with meals)       Cholecalciferol (VITAMIN D3 PO) Take 5,000 Units by mouth daily       ezetimibe (ZETIA) 10 MG tablet Take 1 tablet (10 mg) by mouth daily 90 tablet 3     nitroglycerin (NITROSTAT) 0.4 MG SL tablet Place 1 tablet (0.4 mg) under the tongue every 5 minutes as needed for chest pain If you are still having symptoms after 3 doses (15 minutes) call 911. 25 tablet 0     omeprazole (PRILOSEC) 20 MG capsule Take 20 mg by mouth daily.       alirocumab (PRALUENT) 75 MG/ML injectable pen Inject 1 mL (75 mg) Subcutaneous every 14 days (Patient not taking: Reported on 8/31/2018) 2 mL 9     STATIN NOT PRESCRIBED, INTENTIONAL, Please choose reason not prescribed, below         ALLERGIES     Allergies   Allergen Reactions     Crestor [Rosuvastatin]      Muscle aches, debilitating     Codeine Nausea and Vomiting     Shrimp Nausea and Vomiting       PAST MEDICAL HISTORY:  Past  Medical History:   Diagnosis Date     Abdominal discomfort 12/13    ct abd and pelvis no cause found     Chest pain 2/14    neg est echo     Colonic polyp 2005    fu 2011 nl and to fu 2021     Coronary artery disease involving native coronary artery of native heart without angina pectoris      Dizzy 2009    mri brain nl     DJD (degenerative joint disease)     neck     Elevated blood sugar      GERD (gastroesophageal reflux disease) 2007    egd, benign stricture     Hypercholesteremia 2010    crestor intolerant, lipitor intolerant     CORINA (iron deficiency anaemia) 2008    ugi and sbft nl, be nl     Left ventricular diastolic dysfunction 1/16    echo done for fu and nl ef, no wma     Osteopenia     fu dexa 2011 -1.9 fem neck and -1.2 spine, stable c/w 2007     PONV (postoperative nausea and vomiting)      STEMI (ST elevation myocardial infarction) (H) 12/2015    in [place] on cruise, lytics then ptca of lad, 2 stents, had vfib arrest, ef on angio 67%, 40% rca, no other dz; fu est echo 12/16 nl     Syncope     episode August 2017, possible vasovagal     Urethral strictures      Ventricular fibrillation (H) 12/15    during mi     Vitamin D deficiency        PAST SURGICAL HISTORY:  Past Surgical History:   Procedure Laterality Date     C LIGATE FALLOPIAN TUBE,POSTPARTUM  85    Tubal Ligation     HC REMOVAL OF OVARY/TUBE(S)  81    Salpingo-Oophorectomy, Unilateral(endometriosis)     LAPAROTOMY EXPLORATORY  79    due to endomet, removed ovary     REPAIR PTOSIS BILATERAL  2/7/2012    Procedure:REPAIR PTOSIS BILATERAL; BILATERAL UPPER LID MECHANICAL PTOSIS REPAIR,and ptosis repair; Surgeon:JOVANA BETANCUR; Location:[facility]       FAMILY HISTORY:  Family History   Problem Relation Age of Onset     Diabetes Father      Cancer Father      [place]     Cancer Mother      CLL     Diabetes Paternal Aunt      Diabetes Paternal Aunt      Diabetes Paternal Aunt      Diabetes Paternal Uncle      Diabetes Paternal Uncle       "Diabetes Paternal Uncle      Breast Cancer Paternal Aunt      Cancer - colorectal Paternal Aunt      Cancer - colorectal Paternal Aunt      Cancer Paternal Uncle      bladder       SOCIAL HISTORY:  Social History     Social History     Marital status:      Spouse name: N/A     Number of children: 1     Years of education: N/A     Occupational History     rn, retired 2015 [employer],[address]     Social History Main Topics     Smoking status: Former Smoker     Quit date: 1/28/1982     Smokeless tobacco: Never Used      Comment: quit 23 years ago     Alcohol use No     Drug use: No     Sexual activity: Yes     Partners: Male     Other Topics Concern     Special Diet No     heart healthy, weight watches      Exercise No     Social History Narrative       Review of Systems:  Skin:  Negative       Eyes:  Positive for glasses    ENT:  Negative      Respiratory:  Negative       Cardiovascular:  Negative for;palpitations;lightheadedness   upon standing (chronic r/t low BP per pt)   Gastroenterology: Positive for heartburn;reflux    Genitourinary:  Negative      Musculoskeletal:  Positive for   L hip spurs  Neurologic:  Negative      Psychiatric:  Positive for   worked NOC shift for years, occ issues sleeping   Heme/Lymph/Imm:  Positive for allergies    Endocrine:  Negative        Physical Exam:  Vitals: /74  Pulse 72  Ht 1.651 m (5' 5\")  Wt 68 kg (150 lb)  BMI 24.96 kg/m2    Constitutional:  cooperative        Skin:  warm and dry to the touch          Head:  normocephalic        Eyes:  pupils equal and round        Lymph:      ENT:  dentition good        Neck:  JVP normal        Respiratory:  clear to auscultation;normal symmetry         Cardiac: regular rhythm;normal S1 and S2                                                         GI:           Extremities and Muscular Skeletal:  no edema              Neurological:  affect appropriate        Psych:  Alert and Oriented x 3  "   Encounter Diagnoses   Name Primary?     Mixed hyperlipidemia      Coronary artery disease involving native coronary artery of native heart without angina pectoris Yes       Recent Lab Results:  LIPID RESULTS:  Lab Results   Component Value Date    CHOL 251 (H) 08/31/2018    HDL 56 08/31/2018     (H) 08/31/2018    TRIG 115 08/31/2018    CHOLHDLRATIO 4.6 06/01/2015       LIVER ENZYME RESULTS:  Lab Results   Component Value Date    AST 24 08/31/2018    ALT 24 08/31/2018       CBC RESULTS:  Lab Results   Component Value Date    WBC 4.9 08/31/2018    RBC 4.47 08/31/2018    HGB 13.1 08/31/2018    HCT 40.1 08/31/2018    MCV 90 08/31/2018    MCH 29.3 08/31/2018    MCHC 32.7 08/31/2018    RDW 14.0 08/31/2018     08/31/2018       BMP RESULTS:  Lab Results   Component Value Date     08/31/2018    POTASSIUM 4.1 08/31/2018    CHLORIDE 107 08/31/2018    CO2 26 08/31/2018    ANIONGAP 9 08/31/2018     (H) 08/31/2018    BUN 17 08/31/2018    CR 0.65 08/31/2018    GFRESTIMATED >90 08/31/2018    GFRESTBLACK >90 08/31/2018    SHOAIB 9.0 08/31/2018        A1C RESULTS:  Lab Results   Component Value Date    A1C 5.8 (H) 08/31/2018       INR RESULTS:  No results found for: INR        CC  Radha Bennett MD  [address]  [place] [id number]

## 2018-12-14 ENCOUNTER — OFFICE VISIT (OUTPATIENT)
Dept: CARDIOLOGY | Facility: CLINIC | Age: 68
End: 2018-12-14
Payer: COMMERCIAL

## 2018-12-14 VITALS
DIASTOLIC BLOOD PRESSURE: 70 MMHG | HEIGHT: 65 IN | SYSTOLIC BLOOD PRESSURE: 110 MMHG | WEIGHT: 152.5 LBS | BODY MASS INDEX: 25.41 KG/M2 | HEART RATE: 60 BPM

## 2018-12-14 DIAGNOSIS — E78.2 MIXED HYPERLIPIDEMIA: ICD-10-CM

## 2018-12-14 DIAGNOSIS — I25.10 CORONARY ARTERY DISEASE INVOLVING NATIVE HEART WITHOUT ANGINA PECTORIS, UNSPECIFIED VESSEL OR LESION TYPE: ICD-10-CM

## 2018-12-14 DIAGNOSIS — I25.10 CORONARY ARTERY DISEASE INVOLVING NATIVE CORONARY ARTERY OF NATIVE HEART WITHOUT ANGINA PECTORIS: ICD-10-CM

## 2018-12-14 DIAGNOSIS — I25.10 CORONARY ARTERY DISEASE INVOLVING NATIVE CORONARY ARTERY OF NATIVE HEART WITHOUT ANGINA PECTORIS: Primary | ICD-10-CM

## 2018-12-14 LAB
ALT SERPL W P-5'-P-CCNC: 20 U/L (ref 5–30)
CHOLEST SERPL-MCNC: 258 MG/DL
HDLC SERPL-MCNC: 66 MG/DL
LDLC SERPL CALC-MCNC: 162 MG/DL
NONHDLC SERPL-MCNC: 192 MG/DL
TRIGL SERPL-MCNC: 151 MG/DL

## 2018-12-14 PROCEDURE — 84460 ALANINE AMINO (ALT) (SGPT): CPT | Performed by: INTERNAL MEDICINE

## 2018-12-14 PROCEDURE — 99214 OFFICE O/P EST MOD 30 MIN: CPT | Performed by: NURSE PRACTITIONER

## 2018-12-14 PROCEDURE — 80061 LIPID PANEL: CPT | Performed by: INTERNAL MEDICINE

## 2018-12-14 PROCEDURE — 36415 COLL VENOUS BLD VENIPUNCTURE: CPT | Performed by: INTERNAL MEDICINE

## 2018-12-14 RX ORDER — EZETIMIBE 10 MG/1
10 TABLET ORAL DAILY
Qty: 90 TABLET | Refills: 3 | Status: SHIPPED | OUTPATIENT
Start: 2018-12-14 | End: 2018-12-26

## 2018-12-14 RX ORDER — CARVEDILOL 3.12 MG/1
3.12 TABLET ORAL 2 TIMES DAILY WITH MEALS
Qty: 180 TABLET | Refills: 3 | Status: SHIPPED | OUTPATIENT
Start: 2018-12-14 | End: 2019-10-28

## 2018-12-14 RX ORDER — ESTRADIOL 0.1 MG/G
2 CREAM VAGINAL
COMMUNITY
End: 2019-12-13

## 2018-12-14 ASSESSMENT — MIFFLIN-ST. JEOR: SCORE: 1217.62

## 2018-12-14 NOTE — LETTER
12/14/2018    Jesse Tejada MD  [address]  [facility] [address]    RE: Madonna Duque       Dear Colleague,    I had the pleasure of seeing Madonna Duque in the [facility].    HPI and Plan:   I had the pleasure of seeing Madonna Duque today in cardiology clinic follow up. She is a pleasant 68 year old patient of Dr. Bennett with familial hyperlipidemia and coronary artery disease.    In December 2015 while vacationing in [place] she had a ST elevation MI and V. fib arrest.  Subsequently 2 drug-eluting stents were placed in the mid and proximal LAD which had a 90% ulcerative plaque.  Her STEMI occurred in the setting of hypokalemia and dehydration.  She has done well since and has not had recurrent ischemic symptoms.  Her last stress test was in 2016, she had a normal stress echocardiogram with no evidence of stress-induced ischemia and she had a EF of 60-65%, there were no baseline wall motion abnormalities.    She has had hyperlipidemia since she was a child.  She was in a prior wound study at the [facility] and did well on that medication for a while until she developed myalgias similar to the sensation she had on other statins.  With the discontinuation of prior limit her LDL went from 81 up to 172 and her total cholesterol jumped from 173 up to 251.  At her last visit I started her on Zetia 10 mg.  Repeat cholesterol labs do not look improved.  Since her last visit she has been back to the [facility] and is participating in a new cholesterol study using Bempedoic Acid, obviously she does not know if she is getting the placebo or the drug.    Madonna has no concerns today.  She continues to be active with her grandchildren.  She has gained 3 pounds in the last year and she is cognizant of that and wants to reverse it, she may go back to weight watchers.    Labs Reviewed: Total cholesterol 258, HDL 66, , triglycerides  151    Physical Exam  Please see Below coronary artery disease.    Assessment and Plan  1.  Coronary artery disease.  She continues on good medical management and is without ischemic symptoms.  She is intolerant to most statins but is doing okay on the Zetia despite the fact that it made no change in her lab values.  She is also in a study at the [facility] for cholesterol medications but does not know if she is on the placebo or the actual drug.  She continues on beta-blocker and aspirin therapy.She is aware that she is gained 3 pounds and does not want to continue on that trend.    2.  Hyperlipidemia.  She has many statin intolerances and has been a participant in multiple studies now for cholesterol.  Initially she did well on prior Lint but ultimately developed myalgias similar to the one she experienced on Crestor.  She is tolerating Zetia just fine but  it has not really made a significant change in her lab values.  I would be okay if she wanted to stop it but she is going to talk to the people involved in her study before she considers that.        Thank you for allowing me to care for Madonna Duque today.  She should follow-up with Dr. Bennett in 1 years time with lipids prior.    AARON Gomez, CNP  Cardiology    Voice recognition software was used for this note, I have reviewed this note, but errors may have been missed.    Orders Placed This Encounter   Procedures     Follow-Up with Cardiologist     Orders Placed This Encounter   Medications     Cranberry 1000 MG CAPS     Sig: Take 42,000 mg by mouth 2 times daily     estradiol (ESTRACE) 0.1 MG/GM vaginal cream     Sig: Place 2 g vaginally twice a week     ezetimibe (ZETIA) 10 MG tablet     Sig: Take 1 tablet (10 mg) by mouth daily     Dispense:  90 tablet     Refill:  3     carvedilol (COREG) 3.125 MG tablet     Sig: Take 1 tablet (3.125 mg) by mouth 2 times daily (with meals)     Dispense:  180 tablet     Refill:  3     Medications Discontinued  During This Encounter   Medication Reason     carvedilol (COREG) 3.125 MG tablet      ezetimibe (ZETIA) 10 MG tablet          CURRENT MEDICATIONS:  Current Outpatient Medications   Medication Sig Dispense Refill     aspirin 81 MG EC tablet Take 1 tablet (81 mg) by mouth daily 90 tablet 3     carvedilol (COREG) 3.125 MG tablet Take 1 tablet (3.125 mg) by mouth 2 times daily (with meals) 180 tablet 3     Cholecalciferol (VITAMIN D3 PO) Take 5,000 Units by mouth daily       Cranberry 1000 MG CAPS Take 42,000 mg by mouth 2 times daily       estradiol (ESTRACE) 0.1 MG/GM vaginal cream Place 2 g vaginally twice a week       ezetimibe (ZETIA) 10 MG tablet Take 1 tablet (10 mg) by mouth daily 90 tablet 3     omeprazole (PRILOSEC) 20 MG capsule Take 20 mg by mouth daily.       nitroglycerin (NITROSTAT) 0.4 MG SL tablet Place 1 tablet (0.4 mg) under the tongue every 5 minutes as needed for chest pain If you are still having symptoms after 3 doses (15 minutes) call 911. 25 tablet 0     STATIN NOT PRESCRIBED, INTENTIONAL, Please choose reason not prescribed, below         ALLERGIES     Allergies   Allergen Reactions     Crestor [Rosuvastatin]      Muscle aches, debilitating     Codeine Nausea and Vomiting     Shrimp Nausea and Vomiting       PAST MEDICAL HISTORY:  Past Medical History:   Diagnosis Date     Abdominal discomfort 12/13    ct abd and pelvis no cause found     Chest pain 2/14    neg est echo     Colonic polyp 2005    fu 2011 nl and to fu 2021     Coronary artery disease involving native coronary artery of native heart without angina pectoris      Dizzy 2009    mri brain nl     DJD (degenerative joint disease)     neck     Elevated blood sugar      GERD (gastroesophageal reflux disease) 2007    egd, benign stricture     Hypercholesteremia 2010    crestor intolerant, lipitor intolerant     CORINA (iron deficiency anaemia) 2008    ugi and sbft nl, be nl     Left ventricular diastolic dysfunction 1/16    echo done for fu  and nl ef, no wma     Osteopenia     fu dexa 2011 -1.9 fem neck and -1.2 spine, stable c/w 2007     PONV (postoperative nausea and vomiting)      STEMI (ST elevation myocardial infarction) (H) 12/2015    in [place] on cruise, lytics then ptca of lad, 2 stents, had vfib arrest, ef on angio 67%, 40% rca, no other dz; fu est echo 12/16 nl     Syncope     episode August 2017, possible vasovagal     Urethral strictures      Ventricular fibrillation (H) 12/15    during mi     Vitamin D deficiency        PAST SURGICAL HISTORY:  Past Surgical History:   Procedure Laterality Date     C LIGATE FALLOPIAN TUBE,POSTPARTUM  85    Tubal Ligation     HC REMOVAL OF OVARY/TUBE(S)  81    Salpingo-Oophorectomy, Unilateral(endometriosis)     LAPAROTOMY EXPLORATORY  80    due to endomet, removed ovary     REPAIR PTOSIS BILATERAL  2/7/2012    Procedure:REPAIR PTOSIS BILATERAL; BILATERAL UPPER LID MECHANICAL PTOSIS REPAIR,and ptosis repair; Surgeon:JOVANA BETANCUR; Location:[facility]       FAMILY HISTORY:  Family History   Problem Relation Age of Onset     Diabetes Father      Cancer Father         [place]     Cancer Mother         CLL     Diabetes Paternal Aunt      Diabetes Paternal Aunt      Diabetes Paternal Aunt      Diabetes Paternal Uncle      Diabetes Paternal Uncle      Diabetes Paternal Uncle      Breast Cancer Paternal Aunt      Cancer - colorectal Paternal Aunt      Cancer - colorectal Paternal Aunt      Cancer Paternal Uncle         bladder       SOCIAL HISTORY:  Social History     Socioeconomic History     Marital status:      Spouse name: None     Number of children: 1     Years of education: None     Highest education level: None   Social Needs     Financial resource strain: None     Food insecurity - worry: None     Food insecurity - inability: None     Transportation needs - medical: None     Transportation needs - non-medical: None   Occupational History     Occupation: rn, retired 2015     Employer: [employer]  "[employer],[address]   Tobacco Use     Smoking status: Former Smoker     Last attempt to quit: 1982     Years since quittin.9     Smokeless tobacco: Never Used     Tobacco comment: quit 23 years ago   Substance and Sexual Activity     Alcohol use: No     Alcohol/week: 0.0 oz     Drug use: No     Sexual activity: Yes     Partners: Male   Other Topics Concern     Parent/sibling w/ CABG, MI or angioplasty before 65F 55M? Not Asked      Service Not Asked     Blood Transfusions Not Asked     Caffeine Concern Not Asked     Occupational Exposure Not Asked     Hobby Hazards Not Asked     Sleep Concern Not Asked     Stress Concern Not Asked     Weight Concern Not Asked     Special Diet No     Comment: heart healthy, weight watches      Back Care Not Asked     Exercise No     Bike Helmet Not Asked     Seat Belt Not Asked     Self-Exams Not Asked   Social History Narrative     None       Review of Systems:  Skin:  Negative       Eyes:  Positive for glasses    ENT:  Negative      Respiratory:  Negative       Cardiovascular:    Positive for;lightheadedness;dizziness(when standing too fast) upon standing (chronic r/t low BP per pt)   Gastroenterology: Positive for heartburn;reflux(medication is working)    Genitourinary:  Negative      Musculoskeletal:  Negative      Neurologic:  Negative      Psychiatric:  Positive for sleep disturbances    Heme/Lymph/Imm:  Negative      Endocrine:  Negative        Physical Exam:  Vitals: /70   Pulse 60   Ht 1.651 m (5' 5\")   Wt 69.2 kg (152 lb 8 oz)   BMI 25.38 kg/m       Constitutional:  cooperative        Skin:  warm and dry to the touch          Head:  normocephalic        Eyes:  pupils equal and round        Lymph:      ENT:  dentition good        Neck:  JVP normal;no carotid bruit        Respiratory:  clear to auscultation;normal symmetry         Cardiac: regular rhythm;normal S1 and S2                                                         GI:  " abdomen soft        Extremities and Muscular Skeletal:  no edema              Neurological:  affect appropriate        Psych:  Alert and Oriented x 3    Encounter Diagnoses   Name Primary?     Coronary artery disease involving native coronary artery of native heart without angina pectoris Yes     Mixed hyperlipidemia      Coronary artery disease involving native heart without angina pectoris, unspecified vessel or lesion type        Recent Lab Results:  LIPID RESULTS:  Lab Results   Component Value Date    CHOL 258 (H) 12/14/2018    HDL 66 12/14/2018     (H) 12/14/2018    TRIG 151 (H) 12/14/2018    CHOLHDLRATIO 4.6 06/01/2015       LIVER ENZYME RESULTS:  Lab Results   Component Value Date    AST 24 08/31/2018    ALT 20 12/14/2018       CBC RESULTS:  Lab Results   Component Value Date    WBC 4.9 08/31/2018    RBC 4.47 08/31/2018    HGB 13.1 08/31/2018    HCT 40.1 08/31/2018    MCV 90 08/31/2018    MCH 29.3 08/31/2018    MCHC 32.7 08/31/2018    RDW 14.0 08/31/2018     08/31/2018       BMP RESULTS:  Lab Results   Component Value Date     08/31/2018    POTASSIUM 4.1 08/31/2018    CHLORIDE 107 08/31/2018    CO2 26 08/31/2018    ANIONGAP 9 08/31/2018     (H) 08/31/2018    BUN 17 08/31/2018    CR 0.65 08/31/2018    GFRESTIMATED >90 08/31/2018    GFRESTBLACK >90 08/31/2018    SHOAIB 9.0 08/31/2018        A1C RESULTS:  Lab Results   Component Value Date    A1C 5.8 (H) 08/31/2018       INR RESULTS:  No results found for: INR        CC  Jesse Tejada MD  [address]  [place] [address]                    Thank you for allowing me to participate in the care of your patient.      Sincerely,     AARON Escobar [facility]

## 2018-12-14 NOTE — PROGRESS NOTES
HPI and Plan:   I had the pleasure of seeing Madonna Duque today in cardiology clinic follow up. She is a pleasant 68 year old patient of Dr. Bennett with familial hyperlipidemia and coronary artery disease.    In December 2015 while vacationing in [place] she had a ST elevation MI and V. fib arrest.  Subsequently 2 drug-eluting stents were placed in the mid and proximal LAD which had a 90% ulcerative plaque.  Her STEMI occurred in the setting of hypokalemia and dehydration.  She has done well since and has not had recurrent ischemic symptoms.  Her last stress test was in 2016, she had a normal stress echocardiogram with no evidence of stress-induced ischemia and she had a EF of 60-65%, there were no baseline wall motion abnormalities.    She has had hyperlipidemia since she was a child.  She was in a prior wound study at the [facility] and did well on that medication for a while until she developed myalgias similar to the sensation she had on other statins.  With the discontinuation of prior limit her LDL went from 81 up to 172 and her total cholesterol jumped from 173 up to 251.  At her last visit I started her on Zetia 10 mg.  Repeat cholesterol labs do not look improved.  Since her last visit she has been back to the [facility] and is participating in a new cholesterol study using Bempedoic Acid, obviously she does not know if she is getting the placebo or the drug.    Madonna has no concerns today.  She continues to be active with her grandchildren.  She has gained 3 pounds in the last year and she is cognizant of that and wants to reverse it, she may go back to weight watchers.    Labs Reviewed: Total cholesterol 258, HDL 66, , triglycerides 151    Physical Exam  Please see Below coronary artery disease.    Assessment and Plan  1.  Coronary artery disease.  She continues on good medical management and is without ischemic symptoms.  She is intolerant to most statins but is doing  okay on the Zetia despite the fact that it made no change in her lab values.  She is also in a study at the [facility] for cholesterol medications but does not know if she is on the placebo or the actual drug.  She continues on beta-blocker and aspirin therapy.She is aware that she is gained 3 pounds and does not want to continue on that trend.    2.  Hyperlipidemia.  She has many statin intolerances and has been a participant in multiple studies now for cholesterol.  Initially she did well on prior Lint but ultimately developed myalgias similar to the one she experienced on Crestor.  She is tolerating Zetia just fine but  it has not really made a significant change in her lab values.  I would be okay if she wanted to stop it but she is going to talk to the people involved in her study before she considers that.        Thank you for allowing me to care for Madonna Duque today.  She should follow-up with Dr. Bennett in 1 years time with lipids prior.    AARON Gomez, CNP  Cardiology    Voice recognition software was used for this note, I have reviewed this note, but errors may have been missed.    Orders Placed This Encounter   Procedures     Follow-Up with Cardiologist     Orders Placed This Encounter   Medications     Cranberry 1000 MG CAPS     Sig: Take 42,000 mg by mouth 2 times daily     estradiol (ESTRACE) 0.1 MG/GM vaginal cream     Sig: Place 2 g vaginally twice a week     ezetimibe (ZETIA) 10 MG tablet     Sig: Take 1 tablet (10 mg) by mouth daily     Dispense:  90 tablet     Refill:  3     carvedilol (COREG) 3.125 MG tablet     Sig: Take 1 tablet (3.125 mg) by mouth 2 times daily (with meals)     Dispense:  180 tablet     Refill:  3     Medications Discontinued During This Encounter   Medication Reason     carvedilol (COREG) 3.125 MG tablet      ezetimibe (ZETIA) 10 MG tablet          CURRENT MEDICATIONS:  Current Outpatient Medications   Medication Sig Dispense Refill     aspirin 81 MG EC tablet  Take 1 tablet (81 mg) by mouth daily 90 tablet 3     carvedilol (COREG) 3.125 MG tablet Take 1 tablet (3.125 mg) by mouth 2 times daily (with meals) 180 tablet 3     Cholecalciferol (VITAMIN D3 PO) Take 5,000 Units by mouth daily       Cranberry 1000 MG CAPS Take 42,000 mg by mouth 2 times daily       estradiol (ESTRACE) 0.1 MG/GM vaginal cream Place 2 g vaginally twice a week       ezetimibe (ZETIA) 10 MG tablet Take 1 tablet (10 mg) by mouth daily 90 tablet 3     omeprazole (PRILOSEC) 20 MG capsule Take 20 mg by mouth daily.       nitroglycerin (NITROSTAT) 0.4 MG SL tablet Place 1 tablet (0.4 mg) under the tongue every 5 minutes as needed for chest pain If you are still having symptoms after 3 doses (15 minutes) call 911. 25 tablet 0     STATIN NOT PRESCRIBED, INTENTIONAL, Please choose reason not prescribed, below         ALLERGIES     Allergies   Allergen Reactions     Crestor [Rosuvastatin]      Muscle aches, debilitating     Codeine Nausea and Vomiting     Shrimp Nausea and Vomiting       PAST MEDICAL HISTORY:  Past Medical History:   Diagnosis Date     Abdominal discomfort 12/13    ct abd and pelvis no cause found     Chest pain 2/14    neg est echo     Colonic polyp 2005    fu 2011 nl and to fu 2021     Coronary artery disease involving native coronary artery of native heart without angina pectoris      Dizzy 2009    mri brain nl     DJD (degenerative joint disease)     neck     Elevated blood sugar      GERD (gastroesophageal reflux disease) 2007    egd, benign stricture     Hypercholesteremia 2010    crestor intolerant, lipitor intolerant     CORINA (iron deficiency anaemia) 2008    ugi and sbft nl, be nl     Left ventricular diastolic dysfunction 1/16    echo done for fu and nl ef, no wma     Osteopenia     fu dexa 2011 -1.9 fem neck and -1.2 spine, stable c/w 2007     PONV (postoperative nausea and vomiting)      STEMI (ST elevation myocardial infarction) (H) 12/2015    in [place] on cruise, lytics then ptca  of lad, 2 stents, had vfib arrest, ef on angio 67%, 40% rca, no other dz; fu est echo  nl     Syncope     episode 2017, possible vasovagal     Urethral strictures      Ventricular fibrillation (H) 12/15    during mi     Vitamin D deficiency        PAST SURGICAL HISTORY:  Past Surgical History:   Procedure Laterality Date     C LIGATE FALLOPIAN TUBE,POSTPARTUM  85    Tubal Ligation     HC REMOVAL OF OVARY/TUBE(S)  80    Salpingo-Oophorectomy, Unilateral(endometriosis)     LAPAROTOMY EXPLORATORY  79    due to endomet, removed ovary     REPAIR PTOSIS BILATERAL  2012    Procedure:REPAIR PTOSIS BILATERAL; BILATERAL UPPER LID MECHANICAL PTOSIS REPAIR,and ptosis repair; Surgeon:JOVANA BETANCUR; Location:[facility]       FAMILY HISTORY:  Family History   Problem Relation Age of Onset     Diabetes Father      Cancer Father         [place]     Cancer Mother         CLL     Diabetes Paternal Aunt      Diabetes Paternal Aunt      Diabetes Paternal Aunt      Diabetes Paternal Uncle      Diabetes Paternal Uncle      Diabetes Paternal Uncle      Breast Cancer Paternal Aunt      Cancer - colorectal Paternal Aunt      Cancer - colorectal Paternal Aunt      Cancer Paternal Uncle         bladder       SOCIAL HISTORY:  Social History     Socioeconomic History     Marital status:      Spouse name: None     Number of children: 1     Years of education: None     Highest education level: None   Social Needs     Financial resource strain: None     Food insecurity - worry: None     Food insecurity - inability: None     Transportation needs - medical: None     Transportation needs - non-medical: None   Occupational History     Occupation: rn, retired      Employer: [employer]   Tobacco Use     Smoking status: Former Smoker     Last attempt to quit: 1982     Years since quittin.9     Smokeless tobacco: Never Used     Tobacco comment: quit 23 years ago   Substance and  "Sexual Activity     Alcohol use: No     Alcohol/week: 0.0 oz     Drug use: No     Sexual activity: Yes     Partners: Male   Other Topics Concern     Parent/sibling w/ CABG, MI or angioplasty before 65F 55M? Not Asked      Service Not Asked     Blood Transfusions Not Asked     Caffeine Concern Not Asked     Occupational Exposure Not Asked     Hobby Hazards Not Asked     Sleep Concern Not Asked     Stress Concern Not Asked     Weight Concern Not Asked     Special Diet No     Comment: heart healthy, weight watches      Back Care Not Asked     Exercise No     Bike Helmet Not Asked     Seat Belt Not Asked     Self-Exams Not Asked   Social History Narrative     None       Review of Systems:  Skin:  Negative       Eyes:  Positive for glasses    ENT:  Negative      Respiratory:  Negative       Cardiovascular:    Positive for;lightheadedness;dizziness(when standing too fast) upon standing (chronic r/t low BP per pt)   Gastroenterology: Positive for heartburn;reflux(medication is working)    Genitourinary:  Negative      Musculoskeletal:  Negative      Neurologic:  Negative      Psychiatric:  Positive for sleep disturbances    Heme/Lymph/Imm:  Negative      Endocrine:  Negative        Physical Exam:  Vitals: /70   Pulse 60   Ht 1.651 m (5' 5\")   Wt 69.2 kg (152 lb 8 oz)   BMI 25.38 kg/m      Constitutional:  cooperative        Skin:  warm and dry to the touch          Head:  normocephalic        Eyes:  pupils equal and round        Lymph:      ENT:  dentition good        Neck:  JVP normal;no carotid bruit        Respiratory:  clear to auscultation;normal symmetry         Cardiac: regular rhythm;normal S1 and S2                                                         GI:  abdomen soft        Extremities and Muscular Skeletal:  no edema              Neurological:  affect appropriate        Psych:  Alert and Oriented x 3    Encounter Diagnoses   Name Primary?     Coronary artery disease involving native " coronary artery of native heart without angina pectoris Yes     Mixed hyperlipidemia      Coronary artery disease involving native heart without angina pectoris, unspecified vessel or lesion type        Recent Lab Results:  LIPID RESULTS:  Lab Results   Component Value Date    CHOL 258 (H) 12/14/2018    HDL 66 12/14/2018     (H) 12/14/2018    TRIG 151 (H) 12/14/2018    CHOLHDLRATIO 4.6 06/01/2015       LIVER ENZYME RESULTS:  Lab Results   Component Value Date    AST 24 08/31/2018    ALT 20 12/14/2018       CBC RESULTS:  Lab Results   Component Value Date    WBC 4.9 08/31/2018    RBC 4.47 08/31/2018    HGB 13.1 08/31/2018    HCT 40.1 08/31/2018    MCV 90 08/31/2018    MCH 29.3 08/31/2018    MCHC 32.7 08/31/2018    RDW 14.0 08/31/2018     08/31/2018       BMP RESULTS:  Lab Results   Component Value Date     08/31/2018    POTASSIUM 4.1 08/31/2018    CHLORIDE 107 08/31/2018    CO2 26 08/31/2018    ANIONGAP 9 08/31/2018     (H) 08/31/2018    BUN 17 08/31/2018    CR 0.65 08/31/2018    GFRESTIMATED >90 08/31/2018    GFRESTBLACK >90 08/31/2018    SHOAIB 9.0 08/31/2018        A1C RESULTS:  Lab Results   Component Value Date    A1C 5.8 (H) 08/31/2018       INR RESULTS:  No results found for: INR        CC  Jesse Tejada MD  [address]  [place] [address]

## 2018-12-26 ENCOUNTER — TELEPHONE (OUTPATIENT)
Dept: CARDIOLOGY | Facility: CLINIC | Age: 68
End: 2018-12-26

## 2018-12-26 NOTE — TELEPHONE ENCOUNTER
Patient left  advising that she reviewed with the research team that is in charge of the study she is involved in whether or not she could take herself off of zetia as discussed at her last OV with Agata. She was informed that they are ok with this. Patient called and left giving FYI she has stopped her zetia. RN will send to [facility] as FYI.. Patient's med list updated to reflect changes.         12/14/18 OV [facility]    Assessment and Plan  1.  Coronary artery disease.  She continues on good medical management and is without ischemic symptoms.  She is intolerant to most statins but is doing okay on the Zetia despite the fact that it made no change in her lab values.  She is also in a study at the [facility] for cholesterol medications but does not know if she is on the placebo or the actual drug.  She continues on beta-blocker and aspirin therapy.She is aware that she is gained 3 pounds and does not want to continue on that trend.     2.  Hyperlipidemia.  She has many statin intolerances and has been a participant in multiple studies now for cholesterol.  Initially she did well on prior Lint but ultimately developed myalgias similar to the one she experienced on Crestor.  She is tolerating Zetia just fine but  it has not really made a significant change in her lab values.  I would be okay if she wanted to stop it but she is going to talk to the people involved in her study before she considers that.          Thank you for allowing me to care for Madonna Duque today.  She should follow-up with Dr. Bennett in 1 years time with lipids prior.

## 2019-03-25 ENCOUNTER — OFFICE VISIT (OUTPATIENT)
Dept: FAMILY MEDICINE | Facility: CLINIC | Age: 69
End: 2019-03-25
Payer: COMMERCIAL

## 2019-03-25 ENCOUNTER — TELEPHONE (OUTPATIENT)
Dept: FAMILY MEDICINE | Facility: CLINIC | Age: 69
End: 2019-03-25

## 2019-03-25 VITALS
BODY MASS INDEX: 25.16 KG/M2 | HEART RATE: 60 BPM | TEMPERATURE: 97.7 F | OXYGEN SATURATION: 100 % | SYSTOLIC BLOOD PRESSURE: 120 MMHG | HEIGHT: 65 IN | WEIGHT: 151 LBS | DIASTOLIC BLOOD PRESSURE: 70 MMHG

## 2019-03-25 DIAGNOSIS — I21.02 ST ELEVATION MYOCARDIAL INFARCTION INVOLVING LEFT ANTERIOR DESCENDING (LAD) CORONARY ARTERY (H): ICD-10-CM

## 2019-03-25 DIAGNOSIS — R30.0 DYSURIA: Primary | ICD-10-CM

## 2019-03-25 DIAGNOSIS — R82.90 NONSPECIFIC FINDING ON EXAMINATION OF URINE: ICD-10-CM

## 2019-03-25 LAB
ALBUMIN UR-MCNC: NEGATIVE MG/DL
APPEARANCE UR: CLEAR
BACTERIA #/AREA URNS HPF: ABNORMAL /HPF
BILIRUB UR QL STRIP: NEGATIVE
COLOR UR AUTO: YELLOW
GLUCOSE UR STRIP-MCNC: NEGATIVE MG/DL
HGB UR QL STRIP: NEGATIVE
KETONES UR STRIP-MCNC: NEGATIVE MG/DL
LEUKOCYTE ESTERASE UR QL STRIP: ABNORMAL
NITRATE UR QL: POSITIVE
NON-SQ EPI CELLS #/AREA URNS LPF: ABNORMAL /LPF
PH UR STRIP: 6 PH (ref 5–7)
RBC #/AREA URNS AUTO: ABNORMAL /HPF
SOURCE: ABNORMAL
SP GR UR STRIP: 1.01 (ref 1–1.03)
UROBILINOGEN UR STRIP-ACNC: 0.2 EU/DL (ref 0.2–1)
WBC #/AREA URNS AUTO: ABNORMAL /HPF

## 2019-03-25 PROCEDURE — 99213 OFFICE O/P EST LOW 20 MIN: CPT | Performed by: INTERNAL MEDICINE

## 2019-03-25 PROCEDURE — 87086 URINE CULTURE/COLONY COUNT: CPT | Performed by: INTERNAL MEDICINE

## 2019-03-25 PROCEDURE — 81001 URINALYSIS AUTO W/SCOPE: CPT | Performed by: INTERNAL MEDICINE

## 2019-03-25 PROCEDURE — 87088 URINE BACTERIA CULTURE: CPT | Performed by: INTERNAL MEDICINE

## 2019-03-25 PROCEDURE — 87186 SC STD MICRODIL/AGAR DIL: CPT | Performed by: INTERNAL MEDICINE

## 2019-03-25 RX ORDER — SULFAMETHOXAZOLE/TRIMETHOPRIM 800-160 MG
1 TABLET ORAL 2 TIMES DAILY
Qty: 6 TABLET | Refills: 0 | Status: SHIPPED | OUTPATIENT
Start: 2019-03-25 | End: 2019-10-28

## 2019-03-25 RX ORDER — NITROGLYCERIN 0.4 MG/1
0.4 TABLET SUBLINGUAL EVERY 5 MIN PRN
Qty: 25 TABLET | Refills: 0 | Status: SHIPPED | OUTPATIENT
Start: 2019-03-25 | End: 2022-01-03

## 2019-03-25 ASSESSMENT — MIFFLIN-ST. JEOR: SCORE: 1210.81

## 2019-03-25 NOTE — TELEPHONE ENCOUNTER
Reason for call:  Patient reporting a symptom    Symptom or request: UTI    Duration (how long have symptoms been present): 1 month    Have you been treated for this before? positive test results but no treatment    Additional comments: Pt is in a study at the  that confirmed she has a UTI. Wondering if she can acquire Rx or needs to come in    Phone Number patient can be reached at:  Cell number on file:    Telephone Information:   Mobile [phone]     Best Time:  any    Can we leave a detailed message on this number:  YES    Call taken on 3/25/2019 at 8:13 AM by Keyanna Noriega

## 2019-03-25 NOTE — PROGRESS NOTES
The patient presents for urinary symptoms.  Flow long time, probably over a year, she always can feel her bladder.  It is not terribly uncomfortable but she does feel like she has to go often.  There is no burning blood or discharge.  No fevers.  No recent acute changes.  The reason she wanted to come in is because she is in a study regarding a cholesterol medication at the year at which time we checked her urine and noted that it suggested a UTI.    The patient does have a history of irritation of the vulvar area.  For this she was on hormone cream and it has helped significantly.    Past Medical History:   Diagnosis Date     Abdominal discomfort 12/13    ct abd and pelvis no cause found     Chest pain 2/14    neg est echo     Colonic polyp 2005    fu 2011 nl and to fu 2021     Coronary artery disease involving native coronary artery of native heart without angina pectoris      Dizzy 2009    mri brain nl     DJD (degenerative joint disease)     neck     Elevated blood sugar      GERD (gastroesophageal reflux disease) 2007    egd, benign stricture     Hypercholesteremia 2010    crestor intolerant, lipitor intolerant     CORINA (iron deficiency anaemia) 2008    ugi and sbft nl, be nl     Left ventricular diastolic dysfunction 1/16    echo done for fu and nl ef, no wma     Osteopenia     fu dexa 2011 -1.9 fem neck and -1.2 spine, stable c/w 2007     PONV (postoperative nausea and vomiting)      STEMI (ST elevation myocardial infarction) (H) 12/2015    in [place] on cruise, lytics then ptca of lad, 2 stents, had vfib arrest, ef on angio 67%, 40% rca, no other dz; fu est echo 12/16 nl     Syncope     episode August 2017, possible vasovagal     Urethral strictures      Ventricular fibrillation (H) 12/15    during mi     Vitamin D deficiency      Past Surgical History:   Procedure Laterality Date     C LIGATE FALLOPIAN TUBE,POSTPARTUM  85    Tubal Ligation     HC REMOVAL OF OVARY/TUBE(S)  81    Salpingo-Oophorectomy,  Unilateral(endometriosis)     LAPAROTOMY EXPLORATORY  80    due to endomet, removed ovary     REPAIR PTOSIS BILATERAL  2012    Procedure:REPAIR PTOSIS BILATERAL; BILATERAL UPPER LID MECHANICAL PTOSIS REPAIR,and ptosis repair; Surgeon:JOVANA BETANCUR; [facility]     Social History     Socioeconomic History     Marital status:      Spouse name: Not on file     Number of children: 1     Years of education: Not on file     Highest education level: Not on file   Occupational History     Occupation: rn, retired      Employer: [employer] [address]   Social Needs     Financial resource strain: Not on file     Food insecurity:     Worry: Not on file     Inability: Not on file     Transportation needs:     Medical: Not on file     Non-medical: Not on file   Tobacco Use     Smoking status: Former Smoker     Last attempt to quit: 1982     Years since quittin.1     Smokeless tobacco: Never Used     Tobacco comment: quit 23 years ago   Substance and Sexual Activity     Alcohol use: No     Alcohol/week: 0.0 oz     Drug use: No     Sexual activity: Yes     Partners: Male   Lifestyle     Physical activity:     Days per week: Not on file     Minutes per session: Not on file     Stress: Not on file   Relationships     Social connections:     Talks on phone: Not on file     Gets together: Not on file     Attends Holiness service: Not on file     Active member of club or organization: Not on file     Attends meetings of clubs or organizations: Not on file     Relationship status: Not on file     Intimate partner violence:     Fear of current or ex partner: Not on file     Emotionally abused: Not on file     Physically abused: Not on file     Forced sexual activity: Not on file   Other Topics Concern     Parent/sibling w/ CABG, MI or angioplasty before 65F 55M? Not Asked      Service Not Asked     Blood Transfusions Not Asked     Caffeine Concern Not Asked     Occupational  "Exposure Not Asked     Hobby Hazards Not Asked     Sleep Concern Not Asked     Stress Concern Not Asked     Weight Concern Not Asked     Special Diet No     Comment: heart healthy, weight watches      Back Care Not Asked     Exercise No     Bike Helmet Not Asked     Seat Belt Not Asked     Self-Exams Not Asked   Social History Narrative     Not on file     Current Outpatient Medications   Medication Sig Dispense Refill     aspirin 81 MG EC tablet Take 1 tablet (81 mg) by mouth daily 90 tablet 3     carvedilol (COREG) 3.125 MG tablet Take 1 tablet (3.125 mg) by mouth 2 times daily (with meals) 180 tablet 3     Cholecalciferol (VITAMIN D3 PO) Take 5,000 Units by mouth daily       Cranberry 1000 MG CAPS Take 42,000 mg by mouth 2 times daily       estradiol (ESTRACE) 0.1 MG/GM vaginal cream Place 2 g vaginally twice a week       nitroGLYcerin (NITROSTAT) 0.4 MG sublingual tablet Place 1 tablet (0.4 mg) under the tongue every 5 minutes as needed for chest pain If you are still having symptoms after 3 doses (15 minutes) call 911. 25 tablet 0     omeprazole (PRILOSEC) 20 MG capsule Take 20 mg by mouth daily.       STATIN NOT PRESCRIBED, INTENTIONAL, Please choose reason not prescribed, below       sulfamethoxazole-trimethoprim (BACTRIM DS/SEPTRA DS) 800-160 MG tablet Take 1 tablet by mouth 2 times daily 6 tablet 0     sulfamethoxazole-trimethoprim (BACTRIM DS/SEPTRA DS) 800-160 MG tablet Take 1 tablet by mouth 2 times daily 6 tablet 0     Allergies   Allergen Reactions     Crestor [Rosuvastatin]      Muscle aches, debilitating     Codeine Nausea and Vomiting     Shrimp Nausea and Vomiting     FAMILY HISTORY NOTED AND REVIEWED    REVIEW OF SYSTEMS: above    PHYSICAL EXAM    /70 (BP Location: Right arm, Patient Position: Sitting, Cuff Size: Adult Regular)   Pulse 60   Temp 97.7  F (36.5  C) (Tympanic)   Ht 1.651 m (5' 5\")   Wt 68.5 kg (151 lb)   SpO2 100%   Breastfeeding? No   BMI 25.13 kg/m      Patient appears " non toxic  Abdomen normal active bowel sounds, soft non-tender    Urine noted, uc sent    ASSESSMENT:  Probable cystitis    PLAN:  Bactrim ds bid for 3 days  Follow up urol if more issues    Jesse Tejada M.D.

## 2019-03-28 LAB
BACTERIA SPEC CULT: ABNORMAL
SPECIMEN SOURCE: ABNORMAL

## 2019-05-10 ENCOUNTER — TRANSFERRED RECORDS (OUTPATIENT)
Dept: HEALTH INFORMATION MANAGEMENT | Facility: CLINIC | Age: 69
End: 2019-05-10

## 2019-07-14 ENCOUNTER — HOSPITAL ENCOUNTER (EMERGENCY)
Facility: CLINIC | Age: 69
Discharge: HOME | End: 2019-07-14
Attending: EMERGENCY MEDICINE | Admitting: EMERGENCY MEDICINE
Payer: COMMERCIAL

## 2019-07-14 VITALS
HEART RATE: 68 BPM | OXYGEN SATURATION: 98 % | DIASTOLIC BLOOD PRESSURE: 85 MMHG | TEMPERATURE: 97.8 F | SYSTOLIC BLOOD PRESSURE: 137 MMHG | WEIGHT: 149.8 LBS | HEIGHT: 64 IN | RESPIRATION RATE: 14 BRPM | BODY MASS INDEX: 25.57 KG/M2

## 2019-07-14 DIAGNOSIS — S01.01XA LACERATION OF SCALP, INITIAL ENCOUNTER: ICD-10-CM

## 2019-07-14 PROCEDURE — 99283 EMERGENCY DEPT VISIT LOW MDM: CPT

## 2019-07-14 PROCEDURE — 12002 RPR S/N/AX/GEN/TRNK2.6-7.5CM: CPT

## 2019-07-14 ASSESSMENT — ENCOUNTER SYMPTOMS
VOMITING: 0
EYES NEGATIVE: 1
NAUSEA: 0
WOUND: 1

## 2019-07-14 ASSESSMENT — MIFFLIN-ST. JEOR: SCORE: 1189.49

## 2019-07-14 NOTE — ED AVS SNAPSHOT
Emergency Department  [address] [facility] [address]  Phone:  [phone]  Fax:  [phone]                                    Madonna Duque   MRN: [id number]    Department:   Emergency Department   Date of Visit:  7/14/2019           After Visit Summary Signature Page    I have received my discharge instructions, and my questions have been answered. I have discussed any challenges I see with this plan with the nurse or doctor.    ..........................................................................................................................................  Patient/Patient Representative Signature      ..........................................................................................................................................  Patient Representative Print Name and Relationship to Patient    ..................................................               ................................................  Date                                   Time    ..........................................................................................................................................  Reviewed by Signature/Title    ...................................................              ..............................................  Date                                               Time          22EPIC Rev 08/18

## 2019-07-14 NOTE — ED PROVIDER NOTES
History     Chief Complaint:  Fall      HPI   Madonna Duque is a 68 year old female who presents to the emergency department today for evaluation of fall. The patient reports 20 minutes prior to arrival she was in her sewing room, and upon going to sit down she missed her chair causing a mechanical fall. She hit her posterior head while falling causing a laceration. There was no loss of consciousness, and she wasn't lightheaded prior to the fall. She had no immediate headache. Due to this fall she presented to the emergency department today. Upon arrival she says the area around the laceration is sore, but no other concerns. Of note, she does take a daily aspirin. She denies nausea, vomiting, and vision changes.       Allergies:  Crestor [Rosuvastatin]  Codeine  Shrimp        Medications:    aspirin 81 MG EC tablet  carvedilol (COREG) 3.125 MG tablet  Cholecalciferol (VITAMIN D3 PO)  estradiol (ESTRACE) 0.1 MG/GM vaginal cream  nitroGLYcerin (NITROSTAT) 0.4 MG sublingual tablet  omeprazole (PRILOSEC) 20 MG capsule  sulfamethoxazole-trimethoprim (BACTRIM DS/SEPTRA DS) 800-160 MG tablet      Past Medical History:    Abdominal discomfort  Chest pain  Colonic polyp  CAD  Dizzy   DJD  Elevated blood sugar  GERD  Hypercholesterolemia  CORINA  Left ventricular diastolic dysfunction  Osteopenia  PONV  STEM  Syncope  Urethral strictures  Ventricular fibrillation  Vitamin D deficiency      Past Surgical History:    Tubal Ligation  Salpingo-Oophorectomy, Unilateral   Laparotomy exploratory  Repair ptsosis bilateral      Family History:    Diabetes  Castleman's cancer  CLL      Social History:  The patient was accompanied to the ED by herself.  Smoking Status: Former Smoker  Smokeless Tobacco: Never used  Alcohol Use: no   Marital Status:   [2]       Review of Systems   Eyes: Negative.    Gastrointestinal: Negative for nausea and vomiting.   Skin: Positive for wound (posterior head laceration).   All other systems  "reviewed and are negative.        Physical Exam   First Vitals:  BP: 169/88  Pulse: 64  Temp: 97.8  F (36.6  C)  Resp: 14  Height: 162.6 cm (5' 4\")  Weight: 67.9 kg (149 lb 12.8 oz)  SpO2: 99 %      Physical Exam  General: Resting comfortably on the gurney  Head:  Patient has a 1.5 cm laceration to the midline posterior occipital region.  No bony step-offs are detected.  Eyes:  The pupils are normal    Conjunctivae and sclera appear normal  ENT:    The nose is normal    Ears/pinnae are normal  Neck:  Normal range of motion  Skin:  No rash or lesions noted.  Neuro: Speech is normal and fluent.  GCS 15.  Psych:  Awake. Alert.  Normal affect.      Appropriate interactions          Emergency Department Course       Procedures:    Laceration Repair        LACERATION:  A simple and superficial minimally Contaminated 3 cm laceration.      LOCATION:  Posterior Forehead      FUNCTION:  Distally sensation and circulation are intact.      ANESTHESIA:  Bupivacaine with 1/2% Epi total of 7 CCs      PREPARATION:  Irrigation and Scrubbing with Normal Saline and Shur Clens      DEBRIDEMENT:  wound explored, no foreign body found      CLOSURE:  Wound was closed with 3 Staples         Emergency Department Course:  Nursing notes and vitals reviewed.  1437  I performed an exam of the patient as documented above.     1530 I performed the above procedure.    Findings and plan explained to the Patient. Patient discharged home with instructions regarding supportive care, medications, and reasons to return. The importance of close follow-up was reviewed.     Impression & Plan      Medical Decision Making:  Patient presents after striking the back of her head on a solid surface as noted above.  No loss of responsiveness.  No powerful anticoagulation use.  No headache nausea vomiting or other neurologic symptoms.  Patient came in for laceration repair.  There is a 1.5 cm posterior scalp laceration.  This was anesthetized without difficulty " and closed with skin staples.  A total of 3 staples were used these should remain in place for 10 to 14 days.  Head injury sheets are provided.  There is no indication for CT scan at this time.      Diagnosis:    ICD-10-CM    1. Laceration of scalp, initial encounter S01.01XA        Disposition:  Discharged to home.       Scribe Disclosure:  Lorelei ZAVALA, am serving as a scribe at 2:43 PM on 7/14/2019 to document services personally performed by Kenrick Gauthier MD based on my observations and the provider's statements to me.    Lorelei Dumont  7/14/2019    EMERGENCY DEPARTMENT       Kenrick Gauthier MD  07/22/19 [id number]

## 2019-07-24 ENCOUNTER — TRANSFERRED RECORDS (OUTPATIENT)
Dept: HEALTH INFORMATION MANAGEMENT | Facility: CLINIC | Age: 69
End: 2019-07-24

## 2019-09-20 ENCOUNTER — HOSPITAL ENCOUNTER (OUTPATIENT)
Dept: MAMMOGRAPHY | Facility: CLINIC | Age: 69
Discharge: HOME | End: 2019-09-20
Attending: INTERNAL MEDICINE | Admitting: INTERNAL MEDICINE
Payer: COMMERCIAL

## 2019-09-20 DIAGNOSIS — Z12.31 SCREENING MAMMOGRAM, ENCOUNTER FOR: ICD-10-CM

## 2019-09-20 PROCEDURE — 77063 BREAST TOMOSYNTHESIS BI: CPT

## 2019-10-03 ENCOUNTER — HEALTH MAINTENANCE LETTER (OUTPATIENT)
Age: 69
End: 2019-10-03

## 2019-10-27 ASSESSMENT — ACTIVITIES OF DAILY LIVING (ADL): CURRENT_FUNCTION: NO ASSISTANCE NEEDED

## 2019-10-28 ENCOUNTER — OFFICE VISIT (OUTPATIENT)
Dept: FAMILY MEDICINE | Facility: CLINIC | Age: 69
End: 2019-10-28
Payer: COMMERCIAL

## 2019-10-28 VITALS
SYSTOLIC BLOOD PRESSURE: 131 MMHG | BODY MASS INDEX: 25.1 KG/M2 | WEIGHT: 147 LBS | DIASTOLIC BLOOD PRESSURE: 74 MMHG | TEMPERATURE: 97.9 F | HEIGHT: 64 IN | HEART RATE: 64 BPM | OXYGEN SATURATION: 98 %

## 2019-10-28 DIAGNOSIS — D50.8 OTHER IRON DEFICIENCY ANEMIA: ICD-10-CM

## 2019-10-28 DIAGNOSIS — M85.80 OSTEOPENIA, UNSPECIFIED LOCATION: ICD-10-CM

## 2019-10-28 DIAGNOSIS — E55.9 VITAMIN D DEFICIENCY: ICD-10-CM

## 2019-10-28 DIAGNOSIS — I25.10 CORONARY ARTERY DISEASE INVOLVING NATIVE CORONARY ARTERY OF NATIVE HEART WITHOUT ANGINA PECTORIS: ICD-10-CM

## 2019-10-28 DIAGNOSIS — K21.9 GASTROESOPHAGEAL REFLUX DISEASE WITHOUT ESOPHAGITIS: ICD-10-CM

## 2019-10-28 DIAGNOSIS — Z00.00 ROUTINE GENERAL MEDICAL EXAMINATION AT A HEALTH CARE FACILITY: ICD-10-CM

## 2019-10-28 DIAGNOSIS — I49.01 VENTRICULAR FIBRILLATION (H): ICD-10-CM

## 2019-10-28 DIAGNOSIS — Z00.00 ROUTINE GENERAL MEDICAL EXAMINATION AT A HEALTH CARE FACILITY: Primary | ICD-10-CM

## 2019-10-28 DIAGNOSIS — M89.9 DISORDER OF BONE, UNSPECIFIED: ICD-10-CM

## 2019-10-28 DIAGNOSIS — K63.5 POLYP OF COLON, UNSPECIFIED PART OF COLON, UNSPECIFIED TYPE: ICD-10-CM

## 2019-10-28 DIAGNOSIS — E78.2 MIXED HYPERLIPIDEMIA: ICD-10-CM

## 2019-10-28 DIAGNOSIS — I25.10 CORONARY ARTERY DISEASE INVOLVING NATIVE HEART WITHOUT ANGINA PECTORIS, UNSPECIFIED VESSEL OR LESION TYPE: ICD-10-CM

## 2019-10-28 DIAGNOSIS — R73.9 ELEVATED BLOOD SUGAR: ICD-10-CM

## 2019-10-28 DIAGNOSIS — R10.13 EPIGASTRIC PAIN: ICD-10-CM

## 2019-10-28 LAB
ALBUMIN SERPL-MCNC: 4.2 G/DL (ref 3.4–5)
ALP SERPL-CCNC: 69 U/L (ref 40–150)
ALT SERPL W P-5'-P-CCNC: 23 U/L (ref 0–50)
ANION GAP SERPL CALCULATED.3IONS-SCNC: 7 MMOL/L (ref 3–14)
AST SERPL W P-5'-P-CCNC: 16 U/L (ref 0–45)
BILIRUB SERPL-MCNC: 0.4 MG/DL (ref 0.2–1.3)
BUN SERPL-MCNC: 29 MG/DL (ref 7–30)
CALCIUM SERPL-MCNC: 9.6 MG/DL (ref 8.5–10.1)
CHLORIDE SERPL-SCNC: 107 MMOL/L (ref 94–109)
CHOLEST SERPL-MCNC: 231 MG/DL
CO2 SERPL-SCNC: 26 MMOL/L (ref 20–32)
CREAT SERPL-MCNC: 0.66 MG/DL (ref 0.52–1.04)
DEPRECATED CALCIDIOL+CALCIFEROL SERPL-MC: 69 UG/L (ref 20–75)
ERYTHROCYTE [DISTWIDTH] IN BLOOD BY AUTOMATED COUNT: 14.2 % (ref 10–15)
GFR SERPL CREATININE-BSD FRML MDRD: 90 ML/MIN/{1.73_M2}
GLUCOSE SERPL-MCNC: 105 MG/DL (ref 70–99)
HBA1C MFR BLD: 5.8 % (ref 0–5.6)
HCT VFR BLD AUTO: 39.5 % (ref 35–47)
HDLC SERPL-MCNC: 61 MG/DL
HGB BLD-MCNC: 13 G/DL (ref 11.7–15.7)
LDLC SERPL CALC-MCNC: 151 MG/DL
MCH RBC QN AUTO: 30.1 PG (ref 26.5–33)
MCHC RBC AUTO-ENTMCNC: 32.9 G/DL (ref 31.5–36.5)
MCV RBC AUTO: 91 FL (ref 78–100)
NONHDLC SERPL-MCNC: 170 MG/DL
PLATELET # BLD AUTO: 246 10E9/L (ref 150–450)
POTASSIUM SERPL-SCNC: 4.4 MMOL/L (ref 3.4–5.3)
PROT SERPL-MCNC: 7.5 G/DL (ref 6.8–8.8)
RBC # BLD AUTO: 4.32 10E12/L (ref 3.8–5.2)
SODIUM SERPL-SCNC: 140 MMOL/L (ref 133–144)
TRIGL SERPL-MCNC: 93 MG/DL
WBC # BLD AUTO: 5.4 10E9/L (ref 4–11)

## 2019-10-28 PROCEDURE — 85027 COMPLETE CBC AUTOMATED: CPT | Performed by: INTERNAL MEDICINE

## 2019-10-28 PROCEDURE — 36415 COLL VENOUS BLD VENIPUNCTURE: CPT | Performed by: INTERNAL MEDICINE

## 2019-10-28 PROCEDURE — 82306 VITAMIN D 25 HYDROXY: CPT | Performed by: INTERNAL MEDICINE

## 2019-10-28 PROCEDURE — 80053 COMPREHEN METABOLIC PANEL: CPT | Performed by: INTERNAL MEDICINE

## 2019-10-28 PROCEDURE — G0439 PPPS, SUBSEQ VISIT: HCPCS | Performed by: INTERNAL MEDICINE

## 2019-10-28 PROCEDURE — 83036 HEMOGLOBIN GLYCOSYLATED A1C: CPT | Performed by: INTERNAL MEDICINE

## 2019-10-28 PROCEDURE — 80061 LIPID PANEL: CPT | Performed by: INTERNAL MEDICINE

## 2019-10-28 PROCEDURE — 99207 C PAF COMPLETED  NO CHARGE: CPT | Performed by: INTERNAL MEDICINE

## 2019-10-28 PROCEDURE — 99213 OFFICE O/P EST LOW 20 MIN: CPT | Mod: 25 | Performed by: INTERNAL MEDICINE

## 2019-10-28 RX ORDER — CARVEDILOL 3.12 MG/1
3.12 TABLET ORAL 2 TIMES DAILY WITH MEALS
Qty: 180 TABLET | Refills: 3 | Status: SHIPPED | OUTPATIENT
Start: 2019-10-28 | End: 2020-12-31

## 2019-10-28 ASSESSMENT — MIFFLIN-ST. JEOR: SCORE: 1171.79

## 2019-10-28 ASSESSMENT — ACTIVITIES OF DAILY LIVING (ADL): CURRENT_FUNCTION: NO ASSISTANCE NEEDED

## 2019-10-28 NOTE — PROGRESS NOTES
SUBJECTIVE:   Madonna Duque is a 69 year old female who presents for Preventive Visit.    The patient overall is doing well but does have issues to go over today.    For last 2 months she has had intermittent discomfort in the epigastric region.  It started actually with pain in the left abdomen but she has not had that.  It will come and go and can last hours to days.  She can go a week and a half in between.  There is no real pattern to it.  Due to the cramping, hardness, soreness that radiates around to the back.  She does have acid reflux for which she takes Prilosec.  She still gets some antiacids help.  No dysphasia.  No chest pain or shortness of breath.  No nausea or vomiting.  No bowel changes or bloody or black stools.  No fevers or night sweats or weight loss.  No  symptoms.    She otherwise feels well.  She works out.               Past Medical History:      Past Medical History:   Diagnosis Date     Abdominal discomfort 12/13    ct abd and pelvis no cause found     Chest pain 2/14    neg est echo     Colonic polyp 2005    fu 2011 nl and to fu 2021     Coronary artery disease involving native coronary artery of native heart without angina pectoris      Dizzy 2009    mri brain nl     DJD (degenerative joint disease)     neck     Elevated blood sugar      GERD (gastroesophageal reflux disease) 2007    egd, benign stricture     Hypercholesteremia 2010    crestor intolerant, lipitor intolerant     CORINA (iron deficiency anaemia) 2008    ugi and sbft nl, be nl     Left ventricular diastolic dysfunction 1/16    echo done for fu and nl ef, no wma     Osteopenia     fu dexa 2011 -1.9 fem neck and -1.2 spine, stable c/w 2007     PONV (postoperative nausea and vomiting)      STEMI (ST elevation myocardial infarction) (H) 12/2015    in [place] on cruise, lytics then ptca of lad, 2 stents, had vfib arrest, ef on angio 67%, 40% rca, no other dz; fu est echo 12/16 nl     Syncope     episode August 2017,  possible vasovagal     Urethral strictures      Ventricular fibrillation (H) 12/15    during mi     Vitamin D deficiency              Past Surgical History:      Past Surgical History:   Procedure Laterality Date     C LIGATE FALLOPIAN TUBE,POSTPARTUM  85    Tubal Ligation     HC REMOVAL OF OVARY/TUBE(S)  81    Salpingo-Oophorectomy, Unilateral(endometriosis)     LAPAROTOMY EXPLORATORY  80    due to endomet, removed ovary     REPAIR PTOSIS BILATERAL  2012    Procedure:REPAIR PTOSIS BILATERAL; BILATERAL UPPER LID MECHANICAL PTOSIS REPAIR,and ptosis repair; Surgeon:JOVANA BETANCUR; Location:[facility]             Social History:     Social History     Socioeconomic History     Marital status:      Spouse name: Not on file     Number of children: 1     Years of education: Not on file     Highest education level: Not on file   Occupational History     Occupation: rn, retired      Employer: [employer] [address]   Social Needs     Financial resource strain: Not on file     Food insecurity:     Worry: Not on file     Inability: Not on file     Transportation needs:     Medical: Not on file     Non-medical: Not on file   Tobacco Use     Smoking status: Former Smoker     Last attempt to quit: 1982     Years since quittin.7     Smokeless tobacco: Never Used     Tobacco comment: quit 23 years ago   Substance and Sexual Activity     Alcohol use: No     Alcohol/week: 0.0 standard drinks     Drug use: No     Sexual activity: Yes     Partners: Male   Lifestyle     Physical activity:     Days per week: Not on file     Minutes per session: Not on file     Stress: Not on file   Relationships     Social connections:     Talks on phone: Not on file     Gets together: Not on file     Attends Jew service: Not on file     Active member of club or organization: Not on file     Attends meetings of clubs or organizations: Not on file     Relationship status: Not on file     Intimate  partner violence:     Fear of current or ex partner: Not on file     Emotionally abused: Not on file     Physically abused: Not on file     Forced sexual activity: Not on file   Other Topics Concern     Parent/sibling w/ CABG, MI or angioplasty before 65F 55M? Not Asked      Service Not Asked     Blood Transfusions Not Asked     Caffeine Concern Not Asked     Occupational Exposure Not Asked     Hobby Hazards Not Asked     Sleep Concern Not Asked     Stress Concern Not Asked     Weight Concern Not Asked     Special Diet No     Comment: heart healthy, weight watches      Back Care Not Asked     Exercise No     Bike Helmet Not Asked     Seat Belt Not Asked     Self-Exams Not Asked   Social History Narrative     Not on file             Family History:   reviewed         Allergies:     Allergies   Allergen Reactions     Crestor [Rosuvastatin]      Muscle aches, debilitating     Codeine Nausea and Vomiting     Shrimp Nausea and Vomiting             Medications:     Current Outpatient Medications   Medication Sig Dispense Refill     aspirin 81 MG EC tablet Take 1 tablet (81 mg) by mouth daily 90 tablet 3     carvedilol (COREG) 3.125 MG tablet Take 1 tablet (3.125 mg) by mouth 2 times daily (with meals) 180 tablet 3     Cholecalciferol (VITAMIN D3 PO) Take 5,000 Units by mouth daily       Cranberry 1000 MG CAPS Take 42,000 mg by mouth 2 times daily       estradiol (ESTRACE) 0.1 MG/GM vaginal cream Place 2 g vaginally twice a week       nitroGLYcerin (NITROSTAT) 0.4 MG sublingual tablet Place 1 tablet (0.4 mg) under the tongue every 5 minutes as needed for chest pain If you are still having symptoms after 3 doses (15 minutes) call 911. 25 tablet 0     omeprazole (PRILOSEC) 20 MG capsule Take 20 mg by mouth daily.       STATIN NOT PRESCRIBED, INTENTIONAL, Please choose reason not prescribed, below                 Review of Systems:   The 10 point Review of Systems is negative other than noted in the HPI            "Physical Exam:   Blood pressure 131/74, pulse 64, temperature 97.9  F (36.6  C), temperature source Oral, height 1.626 m (5' 4\"), weight 66.7 kg (147 lb), SpO2 98 %, not currently breastfeeding.    Exam:  Constitutional: healthy appearing, alert and in no distress  Heent: Normocephalic. Head without obvious masses or lesions. PERRLDC, EOMI. Mouth exam within normal limits: tongue, mucous membranes, posterior pharynx all normal, no lesions or abnormalities seen.  Tm's and canals within normal limits bilaterally. Neck supple, no nuchal rigidity or masses. No supraclavicular, or cervical adenopathy. Thyroid symmetric, no masses.  Cardiovascular: Regular rate and rhythm, no murmer, rub or gallops.  JVP not elevated, no edema.  Carotids within normal limits bilaterally, no bruits.  Respiratory: Normal respiratory effort.  Lungs clear, normal flow, no wheezing or crackles.  Breasts: Normal bilaterally.  No masses or lesions.  Nipples within normal limits.  No axillary lesions or nodes.  My M.A. Was present during this part of the examination.  Gastrointestinal: Normal active bowel sounds.   Soft, not tender, no masses, guarding or rebound.  No hepatosplenomegaly.   Musculoskeletal: extremities normal, no gross deformities noted.  Skin: no suspicious lesions or rashes   Neurologic: Mental status within normal limits.  Speech fluent.  No gross motor abnormalities and gait intact.  Psychiatric: mentation appears normal and affect normal.         Data:   Labs sent        Assessment:   1. Normal complete physical exam  2. epig discomfort of unclear cause, ddx includes pud, less likely pancreatitis, panc lesion, gallstones, doubt colonic, perf, tumor  3. Ascvd, stable  4. Vtach, no issues  5. Statin intol  6. Colon polyp, up to date on follow up  7. Cici, follow up labs  8. Low vit d, follow up labs  9. Osteopenia, to get dexa  10. Elevated sugar, follow up labs         Plan:   Letter with labs  Try prilosec 40mg, but if not not " "gone then call and do ct  Exercsise, diet  dexa  Up to date colon  Up to date immunizations        Jesse Tejada M.D.            Are you in the first 12 months of your Medicare coverage?  No    Healthy Habits:     In general, how would you rate your overall health?  Excellent    Frequency of exercise:  4-5 days/week    Duration of exercise:  30-45 minutes    Do you usually eat at least 4 servings of fruit and vegetables a day, include whole grains    & fiber and avoid regularly eating high fat or \"junk\" foods?  Yes    Taking medications regularly:  Yes    Barriers to taking medications:  None    Medication side effects:  None    Ability to successfully perform activities of daily living:  No assistance needed    Home Safety:  No safety concerns identified    Hearing Impairment:  No hearing concerns    In the past 6 months, have you been bothered by leaking of urine? Yes    In general, how would you rate your overall mental or emotional health?  Excellent      PHQ-2 Total Score: 0    Additional concerns today:  Yes    Do you feel safe in your environment? YES    Do you have a Health Care Directive? Yes: Patient states has Advance Directive and will bring in a copy to clinic.      Fall risk  Fallen 2 or more times in the past year?: No  Any fall with injury in the past year?: Yes(1 fall, hit head, x3 staples)    Cognitive Screening   1) Repeat 3 items (Leader, Season, Table)    2) Clock draw: NORMAL  3) 3 item recall: Recalls 3 objects  Results: 3 items recalled: COGNITIVE IMPAIRMENT LESS LIKELY    Mini-CogTM Copyright SINCERE Dubois. Licensed by the author for use in [facility]; reprinted with permission (queta@.[facility]). All rights reserved.      Do you have sleep apnea, excessive snoring or daytime drowsiness?: no    Reviewed and updated as needed this visit by clinical staff  Tobacco  Allergies  Meds         Reviewed and updated as needed this visit by Provider        Social History     Tobacco Use     " "Smoking status: Former Smoker     Last attempt to quit: 1982     Years since quittin.7     Smokeless tobacco: Never Used     Tobacco comment: quit 23 years ago   Substance Use Topics     Alcohol use: No     Alcohol/week: 0.0 standard drinks     If you drink alcohol do you typically have >3 drinks per day or >7 drinks per week? No    Alcohol Use 10/27/2019   Prescreen: >3 drinks/day or >7 drinks/week? Not Applicable   Prescreen: >3 drinks/day or >7 drinks/week? -       Current providers sharing in care for this patient include:   Patient Care Team:  Jesse Tejada MD as PCP - General  Jesse Tejada MD as Assigned PCP    The following health maintenance items are reviewed in Epic and correct as of today:  Health Maintenance   Topic Date Due     ADVANCE CARE PLANNING  2017     MEDICARE ANNUAL WELLNESS VISIT  2019     INFLUENZA VACCINE (1) 2019     FALL RISK ASSESSMENT  10/28/2020     COLONOSCOPY  2021     MAMMO SCREENING  2021     DTAP/TDAP/TD IMMUNIZATION (3 - Td) 2022     LIPID  2023     DEXA  Completed     HEPATITIS C SCREENING  Completed     PHQ-2  Completed     PNEUMOCOCCAL IMMUNIZATION 65+ LOW/MEDIUM RISK  Completed     ZOSTER IMMUNIZATION  Completed     IPV IMMUNIZATION  Aged Out     MENINGITIS IMMUNIZATION  Aged Out         Review of Systems      OBJECTIVE:   Ht 1.626 m (5' 4\")   Wt 66.7 kg (147 lb)   Breastfeeding? No   BMI 25.23 kg/m   Estimated body mass index is 25.23 kg/m  as calculated from the following:    Height as of this encounter: 1.626 m (5' 4\").    Weight as of this encounter: 66.7 kg (147 lb).  Physical Exam          ASSESSMENT / PLAN:       End of Life Planning:  Patient currently has an advanced directive: yes    COUNSELING:  Reviewed preventive health counseling, as reflected in patient instructions       Regular exercise       Healthy diet/nutrition    Estimated body mass index is 25.23 kg/m  as calculated from the " "following:    Height as of this encounter: 1.626 m (5' 4\").    Weight as of this encounter: 66.7 kg (147 lb).         reports that she quit smoking about 37 years ago. She has never used smokeless tobacco.      Appropriate preventive services were discussed with this patient, including applicable screening as appropriate for cardiovascular disease, diabetes, osteopenia/osteoporosis, and glaucoma.  As appropriate for age/gender, discussed screening for colorectal cancer, prostate cancer, breast cancer, and cervical cancer. Checklist reviewing preventive services available has been given to the patient.    Reviewed patients plan of care and provided an AVS. The Basic Care Plan (routine screening as documented in Health Maintenance) for Madonna meets the Care Plan requirement. This Care Plan has been established and reviewed with the Patient.    Counseling Resources:  ATP IV Guidelines  Pooled Cohorts Equation Calculator  Breast Cancer Risk Calculator  FRAX Risk Assessment  ICSI Preventive Guidelines  Dietary Guidelines for Americans, 2010  USDA's MyPlate  ASA Prophylaxis  Lung CA Screening    Jesse Tejada MD  [facility]    Identified Health Risks:  "

## 2019-10-29 NOTE — RESULT ENCOUNTER NOTE
It was nice to see you for your physical.  You should be able to review all of the test results.    I am happy report that your CBC your complete blood count is normal with no signs of any abnormalities.  Your vitamin D level is normal as well.    Your blood salts, kidney tests, liver tests, and proteins are all fine.  Your blood sugar and hemoglobin A1c are slightly elevated so please be sure to exercise, eat a healthy diet, and keep your weight down.    Your total cholesterol is high at 231 which is no big surprise.  I wish there is something I could do to lower it and I would encourage you to follow-up with cardiology on this.    I am happy to bring you this overall excellent report.  If your abdominal discomfort is not gone over the next few weeks let me know.  If you have any questions let me know.    Jesse Tejada M.D.

## 2019-10-31 ENCOUNTER — HOSPITAL ENCOUNTER (OUTPATIENT)
Dept: BONE DENSITY | Facility: CLINIC | Age: 69
Discharge: HOME | End: 2019-10-31
Attending: INTERNAL MEDICINE | Admitting: INTERNAL MEDICINE
Payer: COMMERCIAL

## 2019-10-31 DIAGNOSIS — M85.80 OSTEOPENIA, UNSPECIFIED LOCATION: ICD-10-CM

## 2019-10-31 DIAGNOSIS — M89.9 DISORDER OF BONE, UNSPECIFIED: ICD-10-CM

## 2019-10-31 PROCEDURE — 77080 DXA BONE DENSITY AXIAL: CPT

## 2019-11-03 PROBLEM — M85.851 OSTEOPENIA OF BOTH HIPS: Status: ACTIVE | Noted: 2019-10-01

## 2019-11-03 PROBLEM — M85.852 OSTEOPENIA OF BOTH HIPS: Status: ACTIVE | Noted: 2019-10-01

## 2019-11-04 NOTE — RESULT ENCOUNTER NOTE
I am happy to report that your bone density test does not show osteoporosis.  He did have some bone loss or osteopenia but nothing severe.  The best way to avoid further loss is regular exercise.  I would encourage you to make sure you get 1200 mg daily of calcium in your diet as well.    Let me know if you have any questions.    Jesse Tejada M.D.

## 2019-12-12 DIAGNOSIS — E78.00 HYPERCHOLESTEREMIA: Primary | ICD-10-CM

## 2019-12-13 ENCOUNTER — OFFICE VISIT (OUTPATIENT)
Dept: CARDIOLOGY | Facility: CLINIC | Age: 69
End: 2019-12-13
Payer: COMMERCIAL

## 2019-12-13 VITALS
BODY MASS INDEX: 25.27 KG/M2 | WEIGHT: 148 LBS | DIASTOLIC BLOOD PRESSURE: 75 MMHG | HEART RATE: 62 BPM | HEIGHT: 64 IN | SYSTOLIC BLOOD PRESSURE: 119 MMHG

## 2019-12-13 DIAGNOSIS — I25.10 CORONARY ARTERY DISEASE INVOLVING NATIVE CORONARY ARTERY OF NATIVE HEART WITHOUT ANGINA PECTORIS: ICD-10-CM

## 2019-12-13 DIAGNOSIS — E78.00 HYPERCHOLESTEREMIA: ICD-10-CM

## 2019-12-13 DIAGNOSIS — I25.10 CORONARY ARTERY DISEASE INVOLVING NATIVE HEART WITHOUT ANGINA PECTORIS, UNSPECIFIED VESSEL OR LESION TYPE: ICD-10-CM

## 2019-12-13 DIAGNOSIS — E78.2 MIXED HYPERLIPIDEMIA: ICD-10-CM

## 2019-12-13 LAB
CHOLEST SERPL-MCNC: 214 MG/DL
HDLC SERPL-MCNC: 60 MG/DL
LDLC SERPL CALC-MCNC: 133 MG/DL
NONHDLC SERPL-MCNC: 154 MG/DL
TRIGL SERPL-MCNC: 103 MG/DL

## 2019-12-13 PROCEDURE — 36415 COLL VENOUS BLD VENIPUNCTURE: CPT | Performed by: NURSE PRACTITIONER

## 2019-12-13 PROCEDURE — 99214 OFFICE O/P EST MOD 30 MIN: CPT | Performed by: INTERNAL MEDICINE

## 2019-12-13 PROCEDURE — 80061 LIPID PANEL: CPT | Performed by: NURSE PRACTITIONER

## 2019-12-13 RX ORDER — ROSUVASTATIN CALCIUM 5 MG/1
5 TABLET, COATED ORAL DAILY
Qty: 30 TABLET | Refills: 11 | Status: SHIPPED | OUTPATIENT
Start: 2019-12-13 | End: 2020-07-07 | Stop reason: DRUGHIGH

## 2019-12-13 ASSESSMENT — MIFFLIN-ST. JEOR: SCORE: 1176.32

## 2019-12-13 NOTE — PROGRESS NOTES
HPI and Plan:     Ms. Madonna Duque was seen in followup at [facility] in [place].  She is now 69 years old and is followed for history of coronary disease, prior coronary intervention and prior myocardial infarct.      Five years ago, she was in [place].  She experienced an acute anterior ST elevation myocardial infarct complicated by ventricular fibrillatory arrest.  She was evacuated by air and eventually underwent coronary angiography which resulted in placement of 2 stents in the LAD.  She has done well since that time without recurrent symptoms.      She has dyslipidemia but developed myalgias with 40 mg of rosuvastatin.  She then was in a cholesterol therapy study at [facility] run by Dr. Surjit Perez that was discontinued, then was on therapy with Praluent.  She developed leg aching and weakness with Praluent and it necessitated the discontinuation of that medication though her LDL had fallen to 81 mg/dl. She has been significantly intolerant of statins due to severe myalgias with Zetia Mitty, rosuvastatin, atorvastatin and simvastatin.  She is again in a study at [facility] but her lipids remain abnormal.     She continues on aspirin and beta blockade.  She completed a stress echocardiogram in 2016 with an excellent exercise time and no evidence of ischemia.      Her carvedilol was decreased due to fatigue and light-headedness and she feels much better. She is feeling well and denies any anginal symptoms.  She denies any exertional tolerance or shortness breath.  Each morning, she arises and drives to her daughter and son-in-law's home and [place] to get the children off to school.  During the summer months, she is with him full-time and the days are very active as a meet with her sister and a friend each of whom have 2 children to care for of the same ages as her grandchildren.     Exam:  This is a healthy woman in no apparent distress.    The blood pressure was 117/76 mmHg, heart rate 75 beats per  minute and regular and respiratory rate 14-18 per minute.    The chest was clear to auscultation.    On cardiac auscultation, there was an S1 and S2 without extra sounds or murmur.      Assessment/Plan:  Ms. Duque is doing very well without evidence of recurrent ischemia.  She is physically very active and denies any exertional intolerance or anginal symptoms.    I am concerned with the poor control of her dyslipidemia and we discussed that in detail today.  She notes that she has been told by the  that her health is always a primary importance and the study is apparently approaching in and.  As such, we discussed reinstituting rosuvastatin but at a much lower dose of 5 mg nightly.  I explained that besides lowering the LDL, rosuvastatin has a presumed direct effect on reducing inflammation in the coronary endothelium.  We may not reach the goal of 70 mg/dL or less for the LDL but some therapy is better than none and I am hopeful that the dose can gradually be increased.  I believe she will benefit from aggressive lipid-lowering therapy.      In the absence of symptoms, I have recommended a return with myself at 6 months.   Lipids will be reevaluated at 3 months.    No orders of the defined types were placed in this encounter.      No orders of the defined types were placed in this encounter.      Medications Discontinued During This Encounter   Medication Reason     estradiol (ESTRACE) 0.1 MG/GM vaginal cream Stopped by Patient         Encounter Diagnoses   Name Primary?     Coronary artery disease involving native coronary artery of native heart without angina pectoris      Mixed hyperlipidemia      Coronary artery disease involving native heart without angina pectoris, unspecified vessel or lesion type        CURRENT MEDICATIONS:  Current Outpatient Medications   Medication Sig Dispense Refill     aspirin 81 MG EC tablet Take 1 tablet (81 mg) by mouth daily 90 tablet 3     carvedilol (COREG)  3.125 MG tablet Take 1 tablet (3.125 mg) by mouth 2 times daily (with meals) 180 tablet 3     Cholecalciferol (VITAMIN D3 PO) Take 5,000 Units by mouth daily       Cranberry 1000 MG CAPS Take 42,000 mg by mouth 2 times daily       omeprazole (PRILOSEC) 20 MG capsule Take 20 mg by mouth daily.       nitroGLYcerin (NITROSTAT) 0.4 MG sublingual tablet Place 1 tablet (0.4 mg) under the tongue every 5 minutes as needed for chest pain If you are still having symptoms after 3 doses (15 minutes) call 911. (Patient not taking: Reported on 12/13/2019) 25 tablet 0     STATIN NOT PRESCRIBED, INTENTIONAL, Please choose reason not prescribed, below         ALLERGIES     Allergies   Allergen Reactions     Crestor [Rosuvastatin]      Muscle aches, debilitating     Codeine Nausea and Vomiting     Shrimp Nausea and Vomiting       PAST MEDICAL HISTORY:  Past Medical History:   Diagnosis Date     Abdominal discomfort 12/13    ct abd and pelvis no cause found     Chest pain 2/14    neg est echo     Colonic polyp 2005    fu 2011 nl and to fu 2021     Coronary artery disease involving native coronary artery of native heart without angina pectoris      Dizzy 2009    mri brain nl     DJD (degenerative joint disease)     neck     Elevated blood sugar      GERD (gastroesophageal reflux disease) 2007    egd, benign stricture     Hypercholesteremia 2010    crestor intolerant, lipitor intolerant     CORINA (iron deficiency anaemia) 2008    ugi and sbft nl, be nl     Left ventricular diastolic dysfunction 1/16    echo done for fu and nl ef, no wma     Osteopenia     fu dexa 2011 -1.9 fem neck and -1.2 spine, stable c/w 2007     Osteopenia of both hips 10/2019    screening dexa     PONV (postoperative nausea and vomiting)      STEMI (ST elevation myocardial infarction) (H) 12/2015    in [place] on cruise, lytics then ptca of lad, 2 stents, had vfib arrest, ef on angio 67%, 40% rca, no other dz; fu est echo 12/16 nl     Syncope     episode August 2017,  possible vasovagal     Urethral strictures      Ventricular fibrillation (H) 2015    during mi     Vitamin D deficiency        PAST SURGICAL HISTORY:  Past Surgical History:   Procedure Laterality Date     C LIGATE FALLOPIAN TUBE,POSTPARTUM  85    Tubal Ligation     HC REMOVAL OF OVARY/TUBE(S)  81    Salpingo-Oophorectomy, Unilateral(endometriosis)     LAPAROTOMY EXPLORATORY  79    due to endomet, removed ovary     REPAIR PTOSIS BILATERAL  2012    Procedure:REPAIR PTOSIS BILATERAL; BILATERAL UPPER LID MECHANICAL PTOSIS REPAIR,and ptosis repair; Surgeon:JOVANA BETANCUR; Location:[facility]       FAMILY HISTORY:  Family History   Problem Relation Age of Onset     Diabetes Father      Cancer Father         [place]     Cancer Mother         CLL     Diabetes Paternal Aunt      Diabetes Paternal Aunt      Diabetes Paternal Aunt      Diabetes Paternal Uncle      Diabetes Paternal Uncle      Diabetes Paternal Uncle      Breast Cancer Paternal Aunt      Cancer - colorectal Paternal Aunt      Cancer - colorectal Paternal Aunt      Cancer Paternal Uncle         bladder       SOCIAL HISTORY:  Social History     Socioeconomic History     Marital status:      Spouse name: None     Number of children: 1     Years of education: None     Highest education level: None   Occupational History     Occupation: rn, retired      Employer: [employer] [id number]   Social Needs     Financial resource strain: None     Food insecurity:     Worry: None     Inability: None     Transportation needs:     Medical: None     Non-medical: None   Tobacco Use     Smoking status: Former Smoker     Last attempt to quit: 1982     Years since quittin.8     Smokeless tobacco: Never Used     Tobacco comment: quit 23 years ago   Substance and Sexual Activity     Alcohol use: No     Alcohol/week: 0.0 standard drinks     Drug use: No     Sexual activity: Yes     Partners: Male   Lifestyle     Physical activity:  "    Days per week: None     Minutes per session: None     Stress: None   Relationships     Social connections:     Talks on phone: None     Gets together: None     Attends Sabianism service: None     Active member of club or organization: None     Attends meetings of clubs or organizations: None     Relationship status: None     Intimate partner violence:     Fear of current or ex partner: None     Emotionally abused: None     Physically abused: None     Forced sexual activity: None   Other Topics Concern     Parent/sibling w/ CABG, MI or angioplasty before 65F 55M? Not Asked      Service Not Asked     Blood Transfusions Not Asked     Caffeine Concern Not Asked     Occupational Exposure Not Asked     Hobby Hazards Not Asked     Sleep Concern Not Asked     Stress Concern Not Asked     Weight Concern Not Asked     Special Diet No     Comment: heart healthy, weight watches      Back Care Not Asked     Exercise No     Bike Helmet Not Asked     Seat Belt Not Asked     Self-Exams Not Asked   Social History Narrative     None       Review of Systems:  Skin:  Negative       Eyes:  Positive for glasses    ENT:  Negative      Respiratory:  Negative       Cardiovascular:  Negative      Gastroenterology: Negative      Genitourinary:  Negative      Musculoskeletal:  Negative   L hip spurs  Neurologic:  Negative      Psychiatric:  Negative      Heme/Lymph/Imm:  Negative      Endocrine:  Negative        Physical Exam:  Vitals: /75   Pulse 62   Ht 1.626 m (5' 4\")   Wt 67.1 kg (148 lb)   BMI 25.40 kg/m      Constitutional:  cooperative, alert and oriented, well developed, well nourished, in no acute distress        Skin:  warm and dry to the touch          Head:  normocephalic        Eyes:  sclera white        Lymph:      ENT:           Neck:           Respiratory:  normal breath sounds, clear to auscultation, normal A-P diameter, normal symmetry, normal respiratory excursion, no use of accessory muscles     "     Cardiac: regular rhythm, normal S1/S2, no S3 or S4, apical impulse not displaced, no murmurs, gallops or rubs                                                         GI:           Extremities and Muscular Skeletal:                 Neurological:  no gross motor deficits;affect appropriate        Psych:  Alert and Oriented x 3;affect appropriate, oriented to time, person and place          CC  Jesse Tejada MD  [address]  [place] [address]

## 2019-12-13 NOTE — PATIENT INSTRUCTIONS
"[facility] HEART CARE    Medication Changes:  1. Crestor (rosuvastatin) at 5 mg nightly.  2. CoQ 10 at 100 mg daily.  Recommendations:  Call if problems.  Follow-up:  Cholesterol check in 3 months.    To schedule a future appointment, we kindly ask that you call cardiology scheduling at [phone] three months prior to requested revisit date.  [facility] cardiology clinic is staffed with \"Advance Practice Providers\". These are our cardiology Physician Assistants and Nurse Practitioners.   Please call cardiology scheduling if you feel you need clinical evaluation with them at any time for any cardiac reason.   Reminder:  For your safety, we ask that you bring in your current medication(s) or an updated list of your medications with you to EACH office visit. Include the medication name, dose of pill on bottle and how you are taking it. Include over-the-counter medications or supplements. Your provider will review this at each visit and plan your care based on your current information.   ~~~~~~~~~~~~~~~~~~~~~~~~~~~~~~~~~~~~~~~  \"[facility]\" [place] telephone numbers for reference:  Cardiology Scheduling~[phone]    Cardiac Rehabilitation~[phone]  [facility] RN's~[phone] (at [facility])  Cardiology Clinic RN's~[phone]  ~~~~~~~~~~~~~~~~~~~~~~~~~~~~~~~~~~~~~~~~    "

## 2019-12-13 NOTE — LETTER
12/13/2019    Jesse Tejada MD  [address]  [facility] [address]    RE: Madonna Beatrissheila Duque       Dear Colleague,    I had the pleasure of seeing Madonna Duque in the [facility].    HPI and Plan:   See dictation    No orders of the defined types were placed in this encounter.      No orders of the defined types were placed in this encounter.      Medications Discontinued During This Encounter   Medication Reason     estradiol (ESTRACE) 0.1 MG/GM vaginal cream Stopped by Patient         Encounter Diagnoses   Name Primary?     Coronary artery disease involving native coronary artery of native heart without angina pectoris      Mixed hyperlipidemia      Coronary artery disease involving native heart without angina pectoris, unspecified vessel or lesion type        CURRENT MEDICATIONS:  Current Outpatient Medications   Medication Sig Dispense Refill     aspirin 81 MG EC tablet Take 1 tablet (81 mg) by mouth daily 90 tablet 3     carvedilol (COREG) 3.125 MG tablet Take 1 tablet (3.125 mg) by mouth 2 times daily (with meals) 180 tablet 3     Cholecalciferol (VITAMIN D3 PO) Take 5,000 Units by mouth daily       Cranberry 1000 MG CAPS Take 42,000 mg by mouth 2 times daily       omeprazole (PRILOSEC) 20 MG capsule Take 20 mg by mouth daily.       nitroGLYcerin (NITROSTAT) 0.4 MG sublingual tablet Place 1 tablet (0.4 mg) under the tongue every 5 minutes as needed for chest pain If you are still having symptoms after 3 doses (15 minutes) call 911. (Patient not taking: Reported on 12/13/2019) 25 tablet 0     STATIN NOT PRESCRIBED, INTENTIONAL, Please choose reason not prescribed, below         ALLERGIES     Allergies   Allergen Reactions     Crestor [Rosuvastatin]      Muscle aches, debilitating     Codeine Nausea and Vomiting     Shrimp Nausea and Vomiting       PAST MEDICAL HISTORY:  Past Medical History:   Diagnosis Date     Abdominal discomfort 12/13    ct abd and pelvis no  cause found     Chest pain 2/14    neg est echo     Colonic polyp 2005    fu 2011 nl and to fu 2021     Coronary artery disease involving native coronary artery of native heart without angina pectoris      Dizzy 2009    mri brain nl     DJD (degenerative joint disease)     neck     Elevated blood sugar      GERD (gastroesophageal reflux disease) 2007    egd, benign stricture     Hypercholesteremia 2010    crestor intolerant, lipitor intolerant     CORINA (iron deficiency anaemia) 2008    ugi and sbft nl, be nl     Left ventricular diastolic dysfunction 1/16    echo done for fu and nl ef, no wma     Osteopenia     fu dexa 2011 -1.9 fem neck and -1.2 spine, stable c/w 2007     Osteopenia of both hips 10/2019    screening dexa     PONV (postoperative nausea and vomiting)      STEMI (ST elevation myocardial infarction) (H) 12/2015    in [place] on cruise, lytics then ptca of lad, 2 stents, had vfib arrest, ef on angio 67%, 40% rca, no other dz; fu est echo 12/16 nl     Syncope     episode August 2017, possible vasovagal     Urethral strictures      Ventricular fibrillation (H) 12/2015    during mi     Vitamin D deficiency        PAST SURGICAL HISTORY:  Past Surgical History:   Procedure Laterality Date     C LIGATE FALLOPIAN TUBE,POSTPARTUM  85    Tubal Ligation     HC REMOVAL OF OVARY/TUBE(S)  81    Salpingo-Oophorectomy, Unilateral(endometriosis)     LAPAROTOMY EXPLORATORY  80    due to endomet, removed ovary     REPAIR PTOSIS BILATERAL  2/7/2012    Procedure:REPAIR PTOSIS BILATERAL; BILATERAL UPPER LID MECHANICAL PTOSIS REPAIR,and ptosis repair; Surgeon:JOVANA BETANCUR; Location:[facility]       FAMILY HISTORY:  Family History   Problem Relation Age of Onset     Diabetes Father      Cancer Father         [place]     Cancer Mother         CLL     Diabetes Paternal Aunt      Diabetes Paternal Aunt      Diabetes Paternal Aunt      Diabetes Paternal Uncle      Diabetes Paternal Uncle      Diabetes Paternal Uncle       Breast Cancer Paternal Aunt      Cancer - colorectal Paternal Aunt      Cancer - colorectal Paternal Aunt      Cancer Paternal Uncle         bladder       SOCIAL HISTORY:  Social History     Socioeconomic History     Marital status:      Spouse name: None     Number of children: 1     Years of education: None     Highest education level: None   Occupational History     Occupation: rn, retired      Employer: [employer]   Social Needs     Financial resource strain: None     Food insecurity:     Worry: None     Inability: None     Transportation needs:     Medical: None     Non-medical: None   Tobacco Use     Smoking status: Former Smoker     Last attempt to quit: 1982     Years since quittin.8     Smokeless tobacco: Never Used     Tobacco comment: quit 23 years ago   Substance and Sexual Activity     Alcohol use: No     Alcohol/week: 0.0 standard drinks     Drug use: No     Sexual activity: Yes     Partners: Male   Lifestyle     Physical activity:     Days per week: None     Minutes per session: None     Stress: None   Relationships     Social connections:     Talks on phone: None     Gets together: None     Attends Quaker service: None     Active member of club or organization: None     Attends meetings of clubs or organizations: None     Relationship status: None     Intimate partner violence:     Fear of current or ex partner: None     Emotionally abused: None     Physically abused: None     Forced sexual activity: None   Other Topics Concern     Parent/sibling w/ CABG, MI or angioplasty before 65F 55M? Not Asked      Service Not Asked     Blood Transfusions Not Asked     Caffeine Concern Not Asked     Occupational Exposure Not Asked     Hobby Hazards Not Asked     Sleep Concern Not Asked     Stress Concern Not Asked     Weight Concern Not Asked     Special Diet No     Comment: heart healthy, weight watches      Back Care Not Asked     Exercise No      "Bike Helmet Not Asked     Seat Belt Not Asked     Self-Exams Not Asked   Social History Narrative     None       Review of Systems:  Skin:  Negative       Eyes:  Positive for glasses    ENT:  Negative      Respiratory:  Negative       Cardiovascular:  Negative      Gastroenterology: Negative      Genitourinary:  Negative      Musculoskeletal:  Negative   L hip spurs  Neurologic:  Negative      Psychiatric:  Negative      Heme/Lymph/Imm:  Negative      Endocrine:  Negative        Physical Exam:  Vitals: /75   Pulse 62   Ht 1.626 m (5' 4\")   Wt 67.1 kg (148 lb)   BMI 25.40 kg/m       Constitutional:  cooperative, alert and oriented, well developed, well nourished, in no acute distress        Skin:  warm and dry to the touch          Head:  normocephalic        Eyes:  sclera white        Lymph:      ENT:           Neck:           Respiratory:  normal breath sounds, clear to auscultation, normal A-P diameter, normal symmetry, normal respiratory excursion, no use of accessory muscles         Cardiac: regular rhythm, normal S1/S2, no S3 or S4, apical impulse not displaced, no murmurs, gallops or rubs                                                         GI:           Extremities and Muscular Skeletal:                 Neurological:  no gross motor deficits;affect appropriate        Psych:  Alert and Oriented x 3;affect appropriate, oriented to time, person and place          CC  Jesse Tejada MD  [address]  [place] [address]                    Thank you for allowing me to participate in the care of your patient.      Sincerely,     Radha Bennett MD     [facility]    cc:   Jesse Tejada MD  [address]  [place] [address]        "

## 2020-05-18 ENCOUNTER — PRE VISIT (OUTPATIENT)
Dept: CARDIOLOGY | Facility: CLINIC | Age: 70
End: 2020-05-18

## 2020-05-21 ENCOUNTER — TELEPHONE (OUTPATIENT)
Dept: LAB | Facility: CLINIC | Age: 70
End: 2020-05-21

## 2020-05-21 NOTE — TELEPHONE ENCOUNTER
Wellness Screening Tool    Symptom Screening:    Do you have one of the following new symptoms:      Fever or reported chills?  No    A new cough (started within the past 14 days)?  No    Shortness of breath (started within the past 14 days)?  No    Nausea, vomiting or diarrhea?  No    Within the past 3 weeks, have you been exposed to someone with a known positive illness below?      COVID - 19 (known or suspected)  no    Chicken pox?  no    Measles? no    Pertussis?No          Patient notified of visitor restriction:Yes   Patient informed to wear a mask:YES     Patient's appointment status: Patient will be seen in clinic as scheduled on 05/22/20

## 2020-05-22 DIAGNOSIS — E78.2 MIXED HYPERLIPIDEMIA: ICD-10-CM

## 2020-05-22 LAB
ALT SERPL W P-5'-P-CCNC: <5 U/L (ref 5–30)
CHOLEST SERPL-MCNC: 173 MG/DL
HDLC SERPL-MCNC: 58 MG/DL
LDLC SERPL CALC-MCNC: 91 MG/DL
NONHDLC SERPL-MCNC: 115 MG/DL
TRIGL SERPL-MCNC: 122 MG/DL

## 2020-05-22 PROCEDURE — 84460 ALANINE AMINO (ALT) (SGPT): CPT | Performed by: INTERNAL MEDICINE

## 2020-05-22 PROCEDURE — 36415 COLL VENOUS BLD VENIPUNCTURE: CPT | Performed by: INTERNAL MEDICINE

## 2020-05-22 PROCEDURE — 80061 LIPID PANEL: CPT | Performed by: INTERNAL MEDICINE

## 2020-06-03 ENCOUNTER — VIRTUAL VISIT (OUTPATIENT)
Dept: CARDIOLOGY | Facility: CLINIC | Age: 70
End: 2020-06-03
Attending: INTERNAL MEDICINE
Payer: COMMERCIAL

## 2020-06-03 VITALS
HEIGHT: 64 IN | SYSTOLIC BLOOD PRESSURE: 118 MMHG | HEART RATE: 62 BPM | WEIGHT: 154 LBS | BODY MASS INDEX: 26.29 KG/M2 | DIASTOLIC BLOOD PRESSURE: 84 MMHG

## 2020-06-03 DIAGNOSIS — E78.2 MIXED HYPERLIPIDEMIA: ICD-10-CM

## 2020-06-03 DIAGNOSIS — I25.10 CORONARY ARTERY DISEASE INVOLVING NATIVE CORONARY ARTERY OF NATIVE HEART WITHOUT ANGINA PECTORIS: Primary | ICD-10-CM

## 2020-06-03 PROCEDURE — 99213 OFFICE O/P EST LOW 20 MIN: CPT | Mod: 95 | Performed by: INTERNAL MEDICINE

## 2020-06-03 ASSESSMENT — MIFFLIN-ST. JEOR: SCORE: 1207.51

## 2020-06-03 NOTE — PROGRESS NOTES
"Madonna Duque is a 69 year old female who is being evaluated via a billable video visit.      The patient has been notified of following:     \"This video visit will be conducted via a call between you and your physician/provider. We have found that certain health care needs can be provided without the need for an in-person physical exam.  This service lets us provide the care you need with a video conversation.  If a prescription is necessary we can send it directly to your pharmacy.  If lab work is needed we can place an order for that and you can then stop by our lab to have the test done at a later time.    Video visits are billed at different rates depending on your insurance coverage.  Please reach out to your insurance provider with any questions.    If during the course of the call the physician/provider feels a video visit is not appropriate, you will not be charged for this service.\"    Patient has given verbal consent for Video visit? Yes    How would you like to obtain your AVS? MarlaBoscobel    Patient would like the video invitation sent by: Text to cell phone: [phone]    Will anyone else be joining your video visit? No      Patient reported vital signs:  B/P- 118/84  P- 62  Weight- 154    Review Of Systems  Skin: NEGATIVE  Eyes:Ears/Nose/Throat: NEGATIVE  Respiratory: NEGATIVE  Cardiovascular:  Dizziness  Gastrointestinal: NEGATIVE  Genitourinary:NEGATIVE   Musculoskeletal: Muscle pain comes and goes  Neurologic: NEGATIVE  Psychiatric: NEGATIVE  Hematologic/Lymphatic/Immunologic: NEGATIVE  Endocrine:  NEGATIVE      Lory Phillip LPN      Video-Visit Details    Type of service:  Video Visit    Video Start Time: 8:26 AM  Video End Time: 8:43 AM    Ms. Madonna Duque  is 69 years old and is followed for history of coronary disease, prior coronary intervention and prior myocardial infarct.      Five years ago, she was in [place].  She experienced an acute anterior ST elevation myocardial infarct " "complicated by ventricular fibrillatory arrest.  She was evacuated by air and eventually underwent coronary angiography which resulted in placement of 2 stents in the LAD.  She has done well since that time without recurrent symptoms.      She has dyslipidemia but developed severe myalgias with 40 mg of rosuvastatin.  She then was in a cholesterol therapy study at [facility] run by Dr. Surjit Perez that was discontinued, then was on therapy with Praluent.  She developed leg aching and weakness with Praluent and it necessitated the discontinuation of the medication though her LDL had fallen to 81 mg/dl. She has been significantly intolerant of statins due to severe myalgias with Zetia Mitty, rosuvastatin, atorvastatin and simvastatin.  With her last visit, rosuvastatin 5 mg daily was started with concurrent use of coenzyme Q 10 and her LDL has returned at 91 mg/dL (HDL 58 mg/dL liter) as measured last week.     She continues on aspirin and beta blockade.  She completed a stress echocardiogram in 2016 with an excellent exercise time and no evidence of ischemia.      Her carvedilol was decreased due to fatigue and light-headedness and she feels much better. She is feeling well and denies any anginal symptoms.  She denies any exertional tolerance or shortness breath.  She has remained well during the COVID pandemic.  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  She will now be helping care for the  and third grade aged grandchildren well their parents continue to work from home.  The family is trying to social distance as a group.  She has been outdoors gardening and weeding while her  does the mowing.  She notes aching only when she significantly exerts herself with walking and then is sore for a day.  He denies any chest, neck, arm or back discomfort.  Exam:  This is a healthy woman in " no apparent distress.  He was alert and oriented to person place and time.  The blood pressure was reported as 118/84 mmHg, heart rate 75 beats per minute and regular and respiratory rate 14-18 per minute.         Assessment/Plan:  With regard to her history of coronary artery disease, prior myocardial infarction and prior coronary intervention, Ms. Duque is doing very well without evidence of recurrent coronary ischemia.  She is physically very active and denies any exertional intolerance or anginal symptoms.  Her blood pressure is controlled and her lipids have improved.  Last stress study was in 2016 and I have recommended repeating a stress study later this year.  It should be performed on all medications including beta-blockade and will be symptom-limited.    With regard to her lipids, she is fortunately tolerating rosuvastatin this time at a much lower dose.  She is also on coenzyme Q 10.  Her lipids have again improved significantly on the rosuvastatin.  I reassured her that the aching she develops with increased activity is not due to the rosuvastatin and have encouraged her to pursue activities which increase her exertional tolerance.  The rosuvastatin will be continued at 5 mg daily and lipids will be rechecked in 4 to 5 months.  At that time, if the LDL fraction remains above 70 mg/dL, the rosuvastatin can be increased to 10 mg daily.  I am hoping at that level, she will be free of myalgias (particularly with the con current use of coenzyme Q 10) and will attain her goal of 70 mg/dL or less for the LDL cholesterol.  Certainly, she is deriving significant benefit on statin therapy at this time.    In the absence of symptoms, I have recommended a return at 1 year.  If I am not available, I have recommended follow-up with Dr. Mccurdy or another of my partners in this clinic.   Lipids will be reevaluated at 5months.        Medications Discontinued During This Encounter   Medication Reason     estradiol  (ESTRACE) 0.1 MG/GM vaginal cream Stopped by Patient         Originating Location (pt. Location): Home    Distant Location (provider location):  [facility]     Platform used for Video Visit: Nela Bennett MD

## 2020-06-03 NOTE — LETTER
6/3/2020    Jesse Tejada MD  [address]  [facility] [address]    RE: Madonna Duque       Dear Colleague,    I had the pleasure of seeing Madonna Duque in the [facility].    Madonna Duque is a 69 year old female who is being evaluated via a billable video visit.      Ms. Madonna Duque  is 69 years old and is followed for history of coronary disease, prior coronary intervention and prior myocardial infarct.      Five years ago, she was in [place].  She experienced an acute anterior ST elevation myocardial infarct complicated by ventricular fibrillatory arrest.  She was evacuated by air and eventually underwent coronary angiography which resulted in placement of 2 stents in the LAD.  She has done well since that time without recurrent symptoms.      She has dyslipidemia but developed severe myalgias with 40 mg of rosuvastatin.  She then was in a cholesterol therapy study at [facility] run by Dr. Surjit Perez that was discontinued, then was on therapy with Praluent.  She developed leg aching and weakness with Praluent and it necessitated the discontinuation of the medication though her LDL had fallen to 81 mg/dl. She has been significantly intolerant of statins due to severe myalgias with Zetia Mitty, rosuvastatin, atorvastatin and simvastatin.  With her last visit, rosuvastatin 5 mg daily was started with concurrent use of coenzyme Q 10 and her LDL has returned at 91 mg/dL (HDL 58 mg/dL liter) as measured last week.     She continues on aspirin and beta blockade.  She completed a stress echocardiogram in 2016 with an excellent exercise time and no evidence of ischemia.      Her carvedilol was decreased due to fatigue and light-headedness and she feels much better. She is feeling well and denies any anginal symptoms.  She denies any exertional tolerance or shortness breath.  She has remained well during the COVID pandemic.  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  She will now be helping care for the  and third grade aged grandchildren well their parents continue to work from home.  The family is trying to social distance as a group.  She has been outdoors gardening and weeding while her  does the mowing.  She notes aching only when she significantly exerts herself with walking and then is sore for a day.  He denies any chest, neck, arm or back discomfort.  Exam:  This is a healthy woman in no apparent distress.  He was alert and oriented to person place and time.  The blood pressure was reported as 118/84 mmHg, heart rate 75 beats per minute and regular and respiratory rate 14-18 per minute.         Assessment/Plan:  With regard to her history of coronary artery disease, prior myocardial infarction and prior coronary intervention, Ms. Duque is doing very well without evidence of recurrent coronary ischemia.  She is physically very active and denies any exertional intolerance or anginal symptoms.  Her blood pressure is controlled and her lipids have improved.  Last stress study was in 2016 and I have recommended repeating a stress study later this year.  It should be performed on all medications including beta-blockade and will be symptom-limited.    With regard to her lipids, she is fortunately tolerating rosuvastatin this time at a much lower dose.  She is also on coenzyme Q 10.  Her lipids have again improved significantly on the rosuvastatin.  I reassured her that the aching she develops with increased activity is not due to the rosuvastatin and have encouraged her to pursue activities which increase her exertional tolerance.  The rosuvastatin will be continued at 5 mg daily and lipids will be rechecked in 4 to 5 months.  At that time, if the LDL fraction remains above 70 mg/dL, the rosuvastatin can be increased " to 10 mg daily.  I am hoping at that level, she will be free of myalgias (particularly with the con current use of coenzyme Q 10) and will attain her goal of 70 mg/dL or less for the LDL cholesterol.  Certainly, she is deriving significant benefit on statin therapy at this time.    In the absence of symptoms, I have recommended a return at 1 year.  If I am not available, I have recommended follow-up with Dr. Mccurdy or another of my partners in this clinic.   Lipids will be reevaluated at 5months.        Medications Discontinued During This Encounter   Medication Reason     estradiol (ESTRACE) 0.1 MG/GM vaginal cream Stopped by Patient       Thank you for allowing me to participate in the care of your patient.    Sincerely,     Radha Bennett MD     [facility]

## 2020-07-02 ENCOUNTER — TELEPHONE (OUTPATIENT)
Dept: CARDIOLOGY | Facility: CLINIC | Age: 70
End: 2020-07-02

## 2020-07-02 NOTE — TELEPHONE ENCOUNTER

## 2020-07-06 ENCOUNTER — DOCUMENTATION ONLY (OUTPATIENT)
Dept: CARDIOLOGY | Facility: CLINIC | Age: 70
End: 2020-07-06

## 2020-07-06 DIAGNOSIS — E78.00 HYPERCHOLESTEREMIA: Primary | ICD-10-CM

## 2020-07-06 DIAGNOSIS — I25.10 CORONARY ARTERY DISEASE INVOLVING NATIVE CORONARY ARTERY OF NATIVE HEART WITHOUT ANGINA PECTORIS: ICD-10-CM

## 2020-07-06 LAB
ALT SERPL W P-5'-P-CCNC: <5 U/L (ref 5–30)
CHOLEST SERPL-MCNC: 187 MG/DL
HDLC SERPL-MCNC: 63 MG/DL
LDLC SERPL CALC-MCNC: 101 MG/DL
NONHDLC SERPL-MCNC: 124 MG/DL
TRIGL SERPL-MCNC: 113 MG/DL

## 2020-07-06 PROCEDURE — 80061 LIPID PANEL: CPT | Performed by: INTERNAL MEDICINE

## 2020-07-06 PROCEDURE — 36415 COLL VENOUS BLD VENIPUNCTURE: CPT | Performed by: INTERNAL MEDICINE

## 2020-07-06 PROCEDURE — 84460 ALANINE AMINO (ALT) (SGPT): CPT | Performed by: INTERNAL MEDICINE

## 2020-07-06 NOTE — PROGRESS NOTES
Alt 7/6/2020 noted  Message to lab team requesting an flp be run on labs drawn 7/6/2020.    Flp/alt 7/6/2020 noted.   Per Dr. Bennett's dictation 6/3/2020, if LDL >70 then plan to increase statin.    Component      Latest Ref Rng & Units 7/6/2020   Cholesterol      <200 mg/dL 187   Triglycerides      <150 mg/dL 113   HDL Cholesterol      >49 mg/dL 63   LDL Cholesterol Calculated      <100 mg/dL 101 (H)   Non HDL Cholesterol      <130 mg/dL 124   ALT      5 - 30 U/L <5 (L)     Will message Dr. Bennett to verify if patient should try crestor 10mg daily    7/7/2020 reply from Dr. Bennett:  Recommend increase in rosuvastatin to 10 mg daily.   FLP/ALT in 3 months,   If she does not tolerate 10 mg, will decrease to 5 mg daily.   Thanks.   CD    Attempted to contact patient to review lipid panel and Dr. Bennett's recommendation. Left message for patient to call back   Order placed for 3 month flp/alt    1100 spoke with patient, she would like to have 10mg crestor ordered for 90 days. She will call if side effects return and is willing to cut the pills in half if she can only take 5mg. She is aware of plan for 3 month lab recheck.  Rx escripted.

## 2020-07-07 RX ORDER — ROSUVASTATIN CALCIUM 10 MG/1
10 TABLET, COATED ORAL DAILY
Qty: 90 TABLET | Refills: 3 | Status: SHIPPED | OUTPATIENT
Start: 2020-07-07 | End: 2020-12-31

## 2020-10-15 ENCOUNTER — TELEPHONE (OUTPATIENT)
Dept: FAMILY MEDICINE | Facility: CLINIC | Age: 70
End: 2020-10-15

## 2020-10-15 ENCOUNTER — APPOINTMENT (OUTPATIENT)
Dept: CT IMAGING | Facility: CLINIC | Age: 70
End: 2020-10-15
Attending: EMERGENCY MEDICINE
Payer: COMMERCIAL

## 2020-10-15 ENCOUNTER — HOSPITAL ENCOUNTER (EMERGENCY)
Facility: CLINIC | Age: 70
Discharge: HOME | End: 2020-10-15
Attending: EMERGENCY MEDICINE | Admitting: EMERGENCY MEDICINE
Payer: COMMERCIAL

## 2020-10-15 VITALS
HEIGHT: 64 IN | RESPIRATION RATE: 16 BRPM | HEART RATE: 60 BPM | SYSTOLIC BLOOD PRESSURE: 132 MMHG | OXYGEN SATURATION: 99 % | DIASTOLIC BLOOD PRESSURE: 70 MMHG | TEMPERATURE: 97 F | BODY MASS INDEX: 26.43 KG/M2

## 2020-10-15 DIAGNOSIS — R10.9 FLANK PAIN: ICD-10-CM

## 2020-10-15 DIAGNOSIS — N30.00 ACUTE CYSTITIS WITHOUT HEMATURIA: ICD-10-CM

## 2020-10-15 LAB
ALBUMIN UR-MCNC: NEGATIVE MG/DL
ANION GAP SERPL CALCULATED.3IONS-SCNC: 4 MMOL/L (ref 3–14)
APPEARANCE UR: CLEAR
BASOPHILS # BLD AUTO: 0 10E9/L (ref 0–0.2)
BASOPHILS NFR BLD AUTO: 0.3 %
BILIRUB UR QL STRIP: NEGATIVE
BUN SERPL-MCNC: 16 MG/DL (ref 7–30)
CALCIUM SERPL-MCNC: 9.1 MG/DL (ref 8.5–10.1)
CHLORIDE SERPL-SCNC: 106 MMOL/L (ref 94–109)
CO2 SERPL-SCNC: 29 MMOL/L (ref 20–32)
COLOR UR AUTO: ABNORMAL
CREAT SERPL-MCNC: 0.72 MG/DL (ref 0.52–1.04)
DIFFERENTIAL METHOD BLD: NORMAL
EOSINOPHIL # BLD AUTO: 0.1 10E9/L (ref 0–0.7)
EOSINOPHIL NFR BLD AUTO: 1.7 %
ERYTHROCYTE [DISTWIDTH] IN BLOOD BY AUTOMATED COUNT: 13.3 % (ref 10–15)
GFR SERPL CREATININE-BSD FRML MDRD: 85 ML/MIN/{1.73_M2}
GLUCOSE SERPL-MCNC: 96 MG/DL (ref 70–99)
GLUCOSE UR STRIP-MCNC: NEGATIVE MG/DL
HCT VFR BLD AUTO: 39.1 % (ref 35–47)
HGB BLD-MCNC: 13.1 G/DL (ref 11.7–15.7)
HGB UR QL STRIP: NEGATIVE
IMM GRANULOCYTES # BLD: 0 10E9/L (ref 0–0.4)
IMM GRANULOCYTES NFR BLD: 0.1 %
KETONES UR STRIP-MCNC: NEGATIVE MG/DL
LACTATE BLD-SCNC: 0.6 MMOL/L (ref 0.7–2)
LEUKOCYTE ESTERASE UR QL STRIP: ABNORMAL
LYMPHOCYTES # BLD AUTO: 2 10E9/L (ref 0.8–5.3)
LYMPHOCYTES NFR BLD AUTO: 28.8 %
MCH RBC QN AUTO: 29.9 PG (ref 26.5–33)
MCHC RBC AUTO-ENTMCNC: 33.5 G/DL (ref 31.5–36.5)
MCV RBC AUTO: 89 FL (ref 78–100)
MONOCYTES # BLD AUTO: 0.5 10E9/L (ref 0–1.3)
MONOCYTES NFR BLD AUTO: 6.8 %
MUCOUS THREADS #/AREA URNS LPF: PRESENT /LPF
NEUTROPHILS # BLD AUTO: 4.4 10E9/L (ref 1.6–8.3)
NEUTROPHILS NFR BLD AUTO: 62.3 %
NITRATE UR QL: NEGATIVE
NRBC # BLD AUTO: 0 10*3/UL
NRBC BLD AUTO-RTO: 0 /100
PH UR STRIP: 6.5 PH (ref 5–7)
PLATELET # BLD AUTO: 259 10E9/L (ref 150–450)
POTASSIUM SERPL-SCNC: 3.9 MMOL/L (ref 3.4–5.3)
RBC # BLD AUTO: 4.38 10E12/L (ref 3.8–5.2)
RBC #/AREA URNS AUTO: 1 /HPF (ref 0–2)
SODIUM SERPL-SCNC: 139 MMOL/L (ref 133–144)
SOURCE: ABNORMAL
SP GR UR STRIP: 1 (ref 1–1.03)
SQUAMOUS #/AREA URNS AUTO: <1 /HPF (ref 0–1)
UROBILINOGEN UR STRIP-MCNC: NORMAL MG/DL (ref 0–2)
WBC # BLD AUTO: 7.1 10E9/L (ref 4–11)
WBC #/AREA URNS AUTO: 40 /HPF (ref 0–5)

## 2020-10-15 PROCEDURE — 80048 BASIC METABOLIC PNL TOTAL CA: CPT | Performed by: EMERGENCY MEDICINE

## 2020-10-15 PROCEDURE — 99285 EMERGENCY DEPT VISIT HI MDM: CPT | Mod: 25

## 2020-10-15 PROCEDURE — 85025 COMPLETE CBC W/AUTO DIFF WBC: CPT | Performed by: EMERGENCY MEDICINE

## 2020-10-15 PROCEDURE — 81001 URINALYSIS AUTO W/SCOPE: CPT | Performed by: EMERGENCY MEDICINE

## 2020-10-15 PROCEDURE — 74176 CT ABD & PELVIS W/O CONTRAST: CPT

## 2020-10-15 PROCEDURE — 83605 ASSAY OF LACTIC ACID: CPT | Performed by: EMERGENCY MEDICINE

## 2020-10-15 PROCEDURE — 87086 URINE CULTURE/COLONY COUNT: CPT | Performed by: EMERGENCY MEDICINE

## 2020-10-15 RX ORDER — SULFAMETHOXAZOLE/TRIMETHOPRIM 800-160 MG
1 TABLET ORAL 2 TIMES DAILY
Qty: 14 TABLET | Refills: 0 | Status: SHIPPED | OUTPATIENT
Start: 2020-10-15 | End: 2020-10-22

## 2020-10-15 NOTE — TELEPHONE ENCOUNTER
She needs to get evaluation done due to flank pain  I agree with your recommendation  Dr.Nasima Joel MD

## 2020-10-15 NOTE — TELEPHONE ENCOUNTER
Reason for Call:  Other call back    Detailed comments: patient was treated for UTI From Urgent care 10 days ago is still feeling symptoms flank pain , Urgency and burning sensation, please advise    Phone Number Patient can be reached at: Home number on file [phone] (home)    Best Time: today    Can we leave a detailed message on this number? YES    Call taken on 10/15/2020 at 11:22 AM by Fanny Samano

## 2020-10-15 NOTE — ED TRIAGE NOTES
Pt was seen recently and had a bladder infection, on abx that finished yesterday. Pt still having pain and L flank pain now. Sent here for more tests.

## 2020-10-15 NOTE — ED PROVIDER NOTES
"  History   Chief Complaint:  Flank Pain    HPI   Madonna Duque is a 69 year old female with history of UTI due to Citrobacter braakii resistant to cefazolin on culture started on Keflex on 10/3 then Cefdinir 10/5 who presents with increasing dysuria and left flank pain. The patient reports she finished her Cefdinir course two days ago, but had recurrent symptoms beginning yesterday. She notes she has not had any severe abdominal pain, but has a \"twinge\" of left flank pain. She has not had a history of kidney stones in the past. The patient denies any nausea, vomiting, fever, or cough. She has not had any vaginal discharge or bleeding. She denies any chest pain or shortness of breath. The patient says she did have increased blood pressure, but may have been because of her symptoms.    Allergies:  Crestor [Rosuvastatin]  Codeine    Medications:   Aspirin 81 mg   Coreg  Nitroglycerin PRN  Omeprazole  Crestor     Past Medical History:    Colonic polyp  Coronary artery disease   Degenerative disc disease   Gastroesophageal reflux disease  Hyperlipidemia  Iron deficiency anemia  Left ventricular diastolic dysfunction  STEMI  Osteopenia   Ventricular fibrillation   Ureteral stricture   UTI due to Citrobacter braakii resistant to cefazolin on culture    Past Surgical History:    Tubal ligation  Salpingo oophorectomy unilateral  Laparotomy  Stents to LAD x2  Repair ptosis bilateral     Family History:    Diabetes mellitus  Cancer    Social History:  Smoking Status: former smoker, quit 1982  Smokeless Tobacco: Never Used  Alcohol Use: Yes  Drug Use: No  PCP: Jesse Tejada     Review of Systems   All other systems reviewed and are negative.    Physical Exam     Patient Vitals for the past 24 hrs:   BP Temp Temp src Pulse Resp SpO2 Height   10/15/20 2011 -- -- -- -- -- 99 % --   10/15/20 2009 132/70 -- -- 60 -- -- --   10/15/20 1723 (!) 159/60 97  F (36.1  C) Temporal 60 16 100 % 1.626 m (5' 4\")       Physical " Exam  General: Resting on the bed.  Head: No obvious trauma to head.  Ears, Nose, Throat:  External ears normal.  Nose normal.    Eyes:  Conjunctivae clear.  Pupils are equal, round, and reactive.   Neck: Normal range of motion.  Neck supple.   CV: Regular rate and rhythm.  No murmurs.      Respiratory: Effort normal and breath sounds normal.  No wheezing or crackles.   Gastrointestinal: Soft.  No distension. There is suprapubic tenderness.  There is no rigidity, no rebound and no guarding.   Musculoskeletal: No cva tenderness  Neuro: Alert. Moving all extremities appropriately.  Normal speech.    Skin: Skin is warm and dry.  No rash noted.     Emergency Department Course   Imaging:  Radiology findings were communicated with the patient who voiced understanding of the findings.    CT Abd/pelvis without contrast:  1.  No urolithiasis or hydronephrosis. No specific acute abnormality   identified.   2.  Mildly distended and fecalized distal small bowel loop may relate   to constipation.     Imaging independently reviewed and agree with radiologist interpretation.     Laboratory:  Laboratory findings were communicated with the patient who voiced understanding of the findings.    CBC: WNL (WBC 7.1, HGB 13.1, )    BMP: WNL (Creatinine 0.72)    Lactic Acid (1954): 0.6 (L)    UA: Leukocyte Esterase Large, WBC/HPF 40 (H), Mucous Present, o/w Negative    Urine Culture Aerobic Bacterial: Pending     Emergency Department Course:  Past medical records, nursing notes, and vitals reviewed.    1853 I performed an exam of the patient as documented above.     IV was inserted and blood was drawn for laboratory testing, results above.  The patient provided a urine sample here in the emergency department. This was sent for laboratory testing, findings above.  The patient was sent for an abdominal CT while in the emergency department, results above.     2010 I rechecked the patient and discussed the results of her workup thus far.  The patient feels comfortable with the plan for discharge.    Findings and plan explained to the Patient. Patient discharged home with instructions regarding supportive care, medications, and reasons to return. The importance of close follow-up was reviewed.    I personally reviewed the laboratory and imaging results with the Patient and answered all related questions prior to discharge.     Impression & Plan   Medical Decision Making:  Madonna Duque is a 69 year old female who presents for evaluation of dysuria and left flank pain. The patient has been treated for a urinary tract since October 3rd, for which she was prescribed Keflex. However, upon return of the urine culture, she was switched to Cefdinir on October 5th. She had complete resolution of her symptoms upon the completion of her antibiotic course two days ago. However, yesterday she had the onset of dysuria and mild left flank pain, which continued into today and prompted her emergency department visit. Urinalysis confirms the infection.  UC sent. Given that her UTI has not been completely cleared and she has the onset of mild left flank pain twinges, I had concern for possible infected kidney stone, which may be contributing to her symptoms. Thankfully, the CT demonstrated no kidney stone. There has been no fever or significant abdominal pain.  There is no clinical evidence of pyelonephritis, appendicitis, colitis, diverticulitis or any intraabdominal catastrophe.  Lactate is normal not concerning for severe sepsis or septic shock.  CBC shows no ptosis or anemia.  BMP shows no acute electrolyte, metabolic or renal dysfunction.  I did consult with pharmacy regarding medication and urine culture, after discussion we opted for Bactrim.  The patient will be started on Bactrim as this is a different class and the previous culture was sensitive.  Patient has no clear CVA tenderness, fever, I do not suspect pyelonephritis.  Will treat as a complicated UTI  with a seven-day course of Bactrim.  Return if increasing pain, vomiting, fever, or inability to tolerate the oral antibiotic.  Follow up with primary physician is indicated if not improving in 2-3 days.     Diagnosis:    ICD-10-CM    1. Flank pain  R10.9 Basic metabolic panel     Urine Culture   2. Acute cystitis without hematuria  N30.00        Disposition:  Discharged to home.    Discharge Medications:  New Prescriptions    No medications on file       Scribe Disclosure:  Vijay ZAVALA, am serving as a scribe at 6:53 PM on 10/15/2020 to document services personally performed by Aicha Alonso MD based on my observations and the provider's statements to me.        Aicha Alonso MD  10/16/20 0008

## 2020-10-15 NOTE — TELEPHONE ENCOUNTER
"PCP,    Pt went to UC at [facility] on 10/3 for UTI. She was treated with Keflex.  UC revealed growth of Citrobacter braakii that was resistant to the keflex . They had her stop keflex and start Cefdinir for 7 days. Helped with the symptoms almost all the way gone. Once you finished the abx symptoms came back \"full force\". She is still having the same urgency, frequency, and burning.   She is pushing fluids and takes cranberry tablets daily  No fever  Intermittent left flank pain, she states its more like a \"twinge\"    Do you want to refill the Cefdinir or should she go to the ED for flank pain? She wanted me to run it by you before she went to ED.  Please advise    Thank you,  Jonathon LLAMAS RN    "

## 2020-10-15 NOTE — TELEPHONE ENCOUNTER
Patient calling and has Not recvd a Call Back Yet Please call her ASAP [phone] Her Symptoms are bothering her  Shanti Samano  Care Unit Coordinator

## 2020-10-15 NOTE — ED AVS SNAPSHOT
[facility]  [address] [address]  Phone: [phone]  Fax: [phone]                                    Madonna Duque   MRN: [id number]    Department: [facility]   Date of Visit: 10/15/2020           After Visit Summary Signature Page    I have received my discharge instructions, and my questions have been answered. I have discussed any challenges I see with this plan with the nurse or doctor.    ..........................................................................................................................................  Patient/Patient Representative Signature      ..........................................................................................................................................  Patient Representative Print Name and Relationship to Patient    ..................................................               ................................................  Date                                   Time    ..........................................................................................................................................  Reviewed by Signature/Title    ...................................................              ..............................................  Date                                               Time          22EPIC Rev 08/18

## 2020-10-16 LAB
BACTERIA SPEC CULT: NO GROWTH
Lab: NORMAL
SPECIMEN SOURCE: NORMAL

## 2020-10-16 NOTE — DISCHARGE INSTRUCTIONS
Return to the ED if you are unable to tolerate fluids, intractable nausea or vomiting, severe abdominal pain, fevers >101 or other acute changes.  Please follow up with your PCP in 2-3 days.      Discharge Instructions  Urinary Tract Infection  You or your child have been diagnosed with a urinary tract infection, or UTI. The urinary tract includes the kidneys (which make urine/pee), ureters (the tubes that carry urine/pee from the kidneys to the bladder), the bladder (which stores urine/pee), and urethra (the tube that carries urine/pee out of the bladder). Urinary tract infections occur when bacteria travel up the urethra into the bladder (bladder infection) and, in some cases, from there into the kidneys (kidney infection).  Generally, every Emergency Department visit should have a follow-up clinic visit with either a primary or a specialty clinic/provider. Please follow-up as instructed by your emergency provider today.  Return to the Emergency Department if:  You or your child have severe back pain.  You or your child are vomiting (throwing up) so that you cannot take your medicine.  You or your child have a new fever (had not previously had a fever) over 101 F.  You or your child have confusion or are very weak, or feel very ill.  Your child seems much more ill, will not wake up, will not respond right, or is crying for a long time and will not calm down.  You or your child are showing signs of dehydration. These signs may include decreased urination (pee), dry mouth/gums/tongue, or decreased activity.    Follow-up with your provider:   Children under 24 months need to be seen by their regular provider within one week after a diagnosis of a UTI. It may be necessary to do some more tests to look at the child s kidney or bladder.  You should begin to feel better within 24 - 48 hours of starting your antibiotic; follow-up with your regular clinic/doctor/provider if this is not the case.    Treatment:   You will be  treated with an antibiotic to kill the bacteria. We have to make an educated guess, based on what we know about common bacteria and antibiotics, as to which antibiotic will work for your infection. We will be correct most times but there will be some cases where the antibiotic chosen is not correct (see urine cultures below).  Take a pain medication such as acetaminophen (Tylenol ) or ibuprofen (Advil , Motrin , Nuprin ).  Phenazopyridine (Pyridium , Uristat ) is a prescription medication that numbs the bladder to reduce the burning pain of some UTIs.  The same medication is available in a non-prescription version (Azo-Standard , Urodol ). This medication will change the color of the urine and tears (usually blue or orange). If you wear contacts, do not wear them while taking this medication as they may be stained by the medication.    Urine Cultures:  If indicated, a urine culture may have been performed today. This test generally takes 24-48 hours to complete so the results are not known at this time. The results can confirm that an infection is present but also determine which antibiotic is effective for the specific bacteria that is causing the infection. If your urine culture shows that the antibiotic you were given today will not work to treat your infection, we will attempt to contact you to make arrangements to change the antibiotic. If the culture confirms that the antibiotic is effective for your infection, you will not be contacted. We often recommend follow-up with your regular physician/provider on the culture results regardless of this process.    Antibiotic Warning:   If you have been placed on antibiotics - watch for signs of allergic reaction.  These include rash, lip swelling, difficulty breathing, wheezing, and dizziness.  If you develop any of these symptoms, stop the antibiotic immediately and go to an emergency room or urgent care for evaluation.    Probiotics: If you have been given an  "antibiotic, you may want to also take a probiotic pill or eat yogurt with live cultures. Probiotics have \"good bacteria\" to help your intestines stay healthy. Studies have shown that probiotics help prevent diarrhea and other intestine problems (including C. diff infection) when you take antibiotics. You can buy these without a prescription in the pharmacy section of the store.   If you were given a prescription for medicine here today, be sure to read all of the information (including the package insert) that comes with your prescription.  This will include important information about the medicine, its side effects, and any warnings that you need to know about.  The pharmacist who fills the prescription can provide more information and answer questions you may have about the medicine.  If you have questions or concerns that the pharmacist cannot address, please call or return to the Emergency Department.   Remember that you can always come back to the Emergency Department if you are not able to see your regular provider in the amount of time listed above, if you get any new symptoms, or if there is anything that worries you.    "

## 2020-10-16 NOTE — RESULT ENCOUNTER NOTE
Emergency Dept/Urgent Care discharge antibiotic (if prescribed): Sulfamethoxazole-Trimethoprim (Bactrim DS, Septra DS) 800-160 mg PO tablet,  1 tablet by mouth 2 times daily for 7 days.  Date of Rx (if applicable):  10/15/20  No changes in treatment per Urine culture protocol.

## 2020-12-28 ASSESSMENT — ACTIVITIES OF DAILY LIVING (ADL): CURRENT_FUNCTION: NO ASSISTANCE NEEDED

## 2020-12-30 ASSESSMENT — ACTIVITIES OF DAILY LIVING (ADL): CURRENT_FUNCTION: NO ASSISTANCE NEEDED

## 2020-12-30 NOTE — PROGRESS NOTES
SUBJECTIVE:   Madonna Duque is a 70 year old female who presents for Preventive Visit.    She is doing quite well and does workout.  She has no complaints.  Occasional urine infection.               Past Medical History:      Past Medical History:   Diagnosis Date     Abdominal discomfort 12/13    ct abd and pelvis no cause found     Chest pain 2/14    neg est echo     Colonic polyp 2005    fu 2011 nl and to fu 2021     Coronary artery disease involving native coronary artery of native heart without angina pectoris     NSTEMI/Vfib arrest/stents x2 2015     Dizzy 2009    mri brain nl     DJD (degenerative joint disease)     neck     Elevated blood sugar      GERD (gastroesophageal reflux disease) 2007    egd, benign stricture     Hypercholesteremia 2010    crestor intolerant, lipitor intolerant     CORINA (iron deficiency anaemia) 2008    ugi and sbft nl, be nl     Left ventricular diastolic dysfunction 1/16    echo done for fu and nl ef, no wma     Osteopenia     fu dexa 2011 -1.9 fem neck and -1.2 spine, stable c/w 2007     Osteopenia of both hips 10/2019    screening dexa     PONV (postoperative nausea and vomiting)      STEMI (ST elevation myocardial infarction) (H) 12/2015    in [place] on cruise, lytics then ptca of lad, 2 stents, had vfib arrest, ef on angio 67%, 40% rca, no other dz; fu est echo 12/16 nl     Syncope     episode August 2017, possible vasovagal     Urethral strictures      Ventricular fibrillation (H) 12/2015    during mi     Vitamin D deficiency              Past Surgical History:      Past Surgical History:   Procedure Laterality Date     C LIGATE FALLOPIAN TUBE,POSTPARTUM  84    Tubal Ligation     HC REMOVAL OF OVARY/TUBE(S)  80    Salpingo-Oophorectomy, Unilateral(endometriosis)     LAPAROTOMY EXPLORATORY  79    due to endomet, removed ovary     OTHER SURGICAL HISTORY  2015    stents x2 to LAD     REPAIR PTOSIS BILATERAL  2/7/2012    Procedure:REPAIR PTOSIS BILATERAL; BILATERAL UPPER  LID MECHANICAL PTOSIS REPAIR,and ptosis repair; Surgeon:JOVANA BETANCUR; Location[facility]             Social History:     Social History     Socioeconomic History     Marital status:      Spouse name: Not on file     Number of children: 1     Years of education: Not on file     Highest education level: Not on file   Occupational History     Occupation: rn, retired      Employer: [employer],[address]   Social Needs     Financial resource strain: Not on file     Food insecurity     Worry: Not on file     Inability: Not on file     Transportation needs     Medical: Not on file     Non-medical: Not on file   Tobacco Use     Smoking status: Former Smoker     Quit date: 1982     Years since quittin.9     Smokeless tobacco: Never Used     Tobacco comment: quit 23 years ago   Substance and Sexual Activity     Alcohol use: No     Alcohol/week: 0.0 standard drinks     Drug use: No     Sexual activity: Yes     Partners: Male   Lifestyle     Physical activity     Days per week: Not on file     Minutes per session: Not on file     Stress: Not on file   Relationships     Social connections     Talks on phone: Not on file     Gets together: Not on file     Attends Christianity service: Not on file     Active member of club or organization: Not on file     Attends meetings of clubs or organizations: Not on file     Relationship status: Not on file     Intimate partner violence     Fear of current or ex partner: Not on file     Emotionally abused: Not on file     Physically abused: Not on file     Forced sexual activity: Not on file   Other Topics Concern     Parent/sibling w/ CABG, MI or angioplasty before 65F 55M? Not Asked      Service Not Asked     Blood Transfusions Not Asked     Caffeine Concern Not Asked     Occupational Exposure Not Asked     Hobby Hazards Not Asked     Sleep Concern Not Asked     Stress Concern Not Asked     Weight Concern Not Asked     Special Diet No      "Comment: heart healthy, weight watches      Back Care Not Asked     Exercise No     Bike Helmet Not Asked     Seat Belt Not Asked     Self-Exams Not Asked   Social History Narrative     Not on file             Family History:   reviewed         Allergies:     Allergies   Allergen Reactions     Crestor [Rosuvastatin]      Muscle aches, debilitating     Codeine Nausea and Vomiting     Shrimp Nausea and Vomiting             Medications:     Current Outpatient Medications   Medication Sig Dispense Refill     aspirin 81 MG EC tablet Take 1 tablet (81 mg) by mouth daily 90 tablet 3     carvedilol (COREG) 3.125 MG tablet Take 1 tablet (3.125 mg) by mouth 2 times daily (with meals) 180 tablet 3     Cholecalciferol (VITAMIN D3 PO) Take 5,000 Units by mouth daily       coenzyme Q-10 (CO-Q10) 50 MG capsule Take 2 capsules (100 mg) by mouth daily 100 capsule      Cranberry 1000 MG CAPS Take 42,000 mg by mouth 2 times daily       nitroGLYcerin (NITROSTAT) 0.4 MG sublingual tablet Place 1 tablet (0.4 mg) under the tongue every 5 minutes as needed for chest pain If you are still having symptoms after 3 doses (15 minutes) call 911. 25 tablet 0     omeprazole (PRILOSEC) 20 MG capsule Take 20 mg by mouth 2 times daily        rosuvastatin (CRESTOR) 10 MG tablet Take 1 tablet (10 mg) by mouth daily 90 tablet 3               Review of Systems:   The 10 point Review of Systems is negative other than noted in the HPI           Physical Exam:   Blood pressure 136/76, pulse 62, temperature 97  F (36.1  C), temperature source Oral, height 1.626 m (5' 4\"), weight 69.9 kg (154 lb), SpO2 100 %, not currently breastfeeding.    Exam:  Constitutional: healthy appearing, alert and in no distress  Heent: Normocephalic. Head without obvious masses or lesions. PERRLDC, EOMI. Mouth exam within normal limits: tongue, mucous membranes, posterior pharynx all normal, no lesions or abnormalities seen.  Tm's and canals within normal limits bilaterally. Neck " "supple, no nuchal rigidity or masses. No supraclavicular, or cervical adenopathy. Thyroid symmetric, no masses.  Cardiovascular: Regular rate and rhythm, no murmer, rub or gallops.  JVP not elevated, no edema.  Carotids within normal limits bilaterally, no bruits.  Respiratory: Normal respiratory effort.  Lungs clear, normal flow, no wheezing or crackles.  Breasts: Normal bilaterally.  No masses or lesions.  Nipples within normal limits.  No axillary lesions or nodes.  My M.A. Was present during this part of the examination.  Gastrointestinal: Normal active bowel sounds.   Soft, not tender, no masses, guarding or rebound.  No hepatosplenomegaly.   Musculoskeletal: extremities normal, no gross deformities noted.  Skin: no suspicious lesions or rashes   Neurologic: Mental status within normal limits.  Speech fluent.  No gross motor abnormalities and gait intact.  Psychiatric: mentation appears normal and affect normal.         Data:   Labs sent        Assessment:   1. Normal complete physical exam  2. Cad, vfib, no issues  3. Colon polyp, to get follow up  4. Elevated sugar, follow up labs  5. Gerd, controlled  6. Uti, follow up urol  7. Elevated cholesterol, on statin  8. Cici, follow up hemoglobin  9. Osteopenia, calcium and vit d  10. hcm         Plan:   Up to date immunizations  Mammogram when due  Colon  Letter with labs  Exercise, diet      Jesse Tejada M.D.              Patient has been advised of split billing requirements and indicates understanding: Yes   Are you in the first 12 months of your Medicare coverage?  No    Healthy Habits:     In general, how would you rate your overall health?  Good    Frequency of exercise:  2-3 days/week    Duration of exercise:  15-30 minutes    Do you usually eat at least 4 servings of fruit and vegetables a day, include whole grains    & fiber and avoid regularly eating high fat or \"junk\" foods?  Yes    Taking medications regularly:  Yes    Medication side effects:  Muscle " aches    Ability to successfully perform activities of daily living:  No assistance needed    Home Safety:  No safety concerns identified    Hearing Impairment:  No hearing concerns    In the past 6 months, have you been bothered by leaking of urine? Yes    In general, how would you rate your overall mental or emotional health?  Excellent      PHQ-2 Total Score: 0    Additional concerns today:  Yes    Do you feel safe in your environment? Yes    Have you ever done Advance Care Planning? (For example, a Health Directive, POLST, or a discussion with a medical provider or your loved ones about your wishes): Yes, advance care planning is on file.      Fall risk  Fallen 2 or more times in the past year?: No  Any fall with injury in the past year?: No    Cognitive Screening   1) Repeat 3 items (Leader, Season, Table)    2) Clock draw: NORMAL  3) 3 item recall: Recalls 3 objects  Results: 3 items recalled: COGNITIVE IMPAIRMENT LESS LIKELY    Mini-CogTM Copyright SINCERE Dubois. Licensed by the author for use in [facility]; reprinted with permission (soob@[facility]). All rights reserved.      Do you have sleep apnea, excessive snoring or daytime drowsiness?: no    Reviewed and updated as needed this visit by clinical staff                 Reviewed and updated as needed this visit by Provider                Social History     Tobacco Use     Smoking status: Former Smoker     Quit date: 1982     Years since quittin.9     Smokeless tobacco: Never Used     Tobacco comment: quit 23 years ago   Substance Use Topics     Alcohol use: No     Alcohol/week: 0.0 standard drinks     If you drink alcohol do you typically have >3 drinks per day or >7 drinks per week? No    Alcohol Use 2020   Prescreen: >3 drinks/day or >7 drinks/week? Not Applicable   Prescreen: >3 drinks/day or >7 drinks/week? -               Current providers sharing in care for this patient include:   Patient Care Team:  Jesse Tejada MD as  "PCP - General  Jesse Tejada MD as Assigned PCP    The following health maintenance items are reviewed in Epic and correct as of today:  Health Maintenance   Topic Date Due     ADVANCE CARE PLANNING  12/17/2017     INFLUENZA VACCINE (1) 09/01/2020     MEDICARE ANNUAL WELLNESS VISIT  10/28/2020     FALL RISK ASSESSMENT  10/28/2020     COLORECTAL CANCER SCREENING  03/18/2021     MAMMO SCREENING  09/20/2021     DTAP/TDAP/TD IMMUNIZATION (3 - Td) 12/17/2022     LIPID  07/06/2025     DEXA  10/31/2034     HEPATITIS C SCREENING  Completed     PHQ-2  Completed     Pneumococcal Vaccine: 65+ Years  Completed     ZOSTER IMMUNIZATION  Completed     Pneumococcal Vaccine: Pediatrics (0 to 5 Years) and At-Risk Patients (6 to 64 Years)  Aged Out     IPV IMMUNIZATION  Aged Out     MENINGITIS IMMUNIZATION  Aged Out     HEPATITIS B IMMUNIZATION  Aged Out           Review of Systems      OBJECTIVE:   There were no vitals taken for this visit. Estimated body mass index is 26.43 kg/m  as calculated from the following:    Height as of 10/15/20: 1.626 m (5' 4\").    Weight as of 6/3/20: 69.9 kg (154 lb).  Physical Exam          ASSESSMENT / PLAN:       Patient has been advised of split billing requirements and indicates understanding: No  COUNSELING:  Reviewed preventive health counseling, as reflected in patient instructions       Regular exercise       Healthy diet/nutrition    Estimated body mass index is 26.43 kg/m  as calculated from the following:    Height as of 10/15/20: 1.626 m (5' 4\").    Weight as of 6/3/20: 69.9 kg (154 lb).        She reports that she quit smoking about 38 years ago. She has never used smokeless tobacco.      Appropriate preventive services were discussed with this patient, including applicable screening as appropriate for cardiovascular disease, diabetes, osteopenia/osteoporosis, and glaucoma.  As appropriate for age/gender, discussed screening for colorectal cancer, prostate cancer, breast cancer, and " cervical cancer. Checklist reviewing preventive services available has been given to the patient.    Reviewed patients plan of care and provided an AVS. The Basic Care Plan (routine screening as documented in Health Maintenance) for Madonna meets the Care Plan requirement. This Care Plan has been established and reviewed with the Patient.    Counseling Resources:  ATP IV Guidelines  Pooled Cohorts Equation Calculator  Breast Cancer Risk Calculator  Breast Cancer: Medication to Reduce Risk  FRAX Risk Assessment  ICSI Preventive Guidelines  Dietary Guidelines for Americans, 2010  USDA's MyPlate  ASA Prophylaxis  Lung CA Screening    Jesse Tejada MD  [facility]    Identified Health Risks:

## 2020-12-31 ENCOUNTER — OFFICE VISIT (OUTPATIENT)
Dept: FAMILY MEDICINE | Facility: CLINIC | Age: 70
End: 2020-12-31
Payer: COMMERCIAL

## 2020-12-31 VITALS
OXYGEN SATURATION: 100 % | TEMPERATURE: 97 F | HEART RATE: 62 BPM | DIASTOLIC BLOOD PRESSURE: 76 MMHG | WEIGHT: 154 LBS | HEIGHT: 64 IN | BODY MASS INDEX: 26.29 KG/M2 | SYSTOLIC BLOOD PRESSURE: 136 MMHG

## 2020-12-31 DIAGNOSIS — M85.851 OSTEOPENIA OF BOTH HIPS: ICD-10-CM

## 2020-12-31 DIAGNOSIS — Z00.00 ROUTINE GENERAL MEDICAL EXAMINATION AT A HEALTH CARE FACILITY: Primary | ICD-10-CM

## 2020-12-31 DIAGNOSIS — R73.9 ELEVATED BLOOD SUGAR: ICD-10-CM

## 2020-12-31 DIAGNOSIS — E78.2 MIXED HYPERLIPIDEMIA: ICD-10-CM

## 2020-12-31 DIAGNOSIS — M85.852 OSTEOPENIA OF BOTH HIPS: ICD-10-CM

## 2020-12-31 DIAGNOSIS — K63.5 POLYP OF COLON, UNSPECIFIED PART OF COLON, UNSPECIFIED TYPE: ICD-10-CM

## 2020-12-31 DIAGNOSIS — D50.8 OTHER IRON DEFICIENCY ANEMIA: ICD-10-CM

## 2020-12-31 DIAGNOSIS — E78.00 HYPERCHOLESTEREMIA: ICD-10-CM

## 2020-12-31 DIAGNOSIS — I25.10 CORONARY ARTERY DISEASE INVOLVING NATIVE HEART WITHOUT ANGINA PECTORIS, UNSPECIFIED VESSEL OR LESION TYPE: ICD-10-CM

## 2020-12-31 DIAGNOSIS — I49.01 VENTRICULAR FIBRILLATION (H): ICD-10-CM

## 2020-12-31 DIAGNOSIS — K21.9 GASTROESOPHAGEAL REFLUX DISEASE WITHOUT ESOPHAGITIS: ICD-10-CM

## 2020-12-31 DIAGNOSIS — I25.10 CORONARY ARTERY DISEASE INVOLVING NATIVE CORONARY ARTERY OF NATIVE HEART WITHOUT ANGINA PECTORIS: ICD-10-CM

## 2020-12-31 LAB
ERYTHROCYTE [DISTWIDTH] IN BLOOD BY AUTOMATED COUNT: 14 % (ref 10–15)
HCT VFR BLD AUTO: 40.6 % (ref 35–47)
HGB BLD-MCNC: 13.3 G/DL (ref 11.7–15.7)
MCH RBC QN AUTO: 29.6 PG (ref 26.5–33)
MCHC RBC AUTO-ENTMCNC: 32.8 G/DL (ref 31.5–36.5)
MCV RBC AUTO: 90 FL (ref 78–100)
PLATELET # BLD AUTO: 274 10E9/L (ref 150–450)
RBC # BLD AUTO: 4.49 10E12/L (ref 3.8–5.2)
WBC # BLD AUTO: 5.5 10E9/L (ref 4–11)

## 2020-12-31 PROCEDURE — 85027 COMPLETE CBC AUTOMATED: CPT | Performed by: INTERNAL MEDICINE

## 2020-12-31 PROCEDURE — 80053 COMPREHEN METABOLIC PANEL: CPT | Performed by: INTERNAL MEDICINE

## 2020-12-31 PROCEDURE — 99397 PER PM REEVAL EST PAT 65+ YR: CPT | Performed by: INTERNAL MEDICINE

## 2020-12-31 PROCEDURE — 80061 LIPID PANEL: CPT | Performed by: INTERNAL MEDICINE

## 2020-12-31 PROCEDURE — 36415 COLL VENOUS BLD VENIPUNCTURE: CPT | Performed by: INTERNAL MEDICINE

## 2020-12-31 RX ORDER — ROSUVASTATIN CALCIUM 10 MG/1
10 TABLET, COATED ORAL DAILY
Qty: 90 TABLET | Refills: 3 | Status: SHIPPED | OUTPATIENT
Start: 2020-12-31 | End: 2022-01-03

## 2020-12-31 RX ORDER — CARVEDILOL 3.12 MG/1
3.12 TABLET ORAL 2 TIMES DAILY WITH MEALS
Qty: 180 TABLET | Refills: 3 | Status: SHIPPED | OUTPATIENT
Start: 2020-12-31 | End: 2022-01-03

## 2020-12-31 ASSESSMENT — MIFFLIN-ST. JEOR: SCORE: 1198.54

## 2021-01-01 LAB
ALBUMIN SERPL-MCNC: 4.1 G/DL (ref 3.4–5)
ALP SERPL-CCNC: 83 U/L (ref 40–150)
ALT SERPL W P-5'-P-CCNC: 29 U/L (ref 0–50)
ANION GAP SERPL CALCULATED.3IONS-SCNC: 4 MMOL/L (ref 3–14)
AST SERPL W P-5'-P-CCNC: 28 U/L (ref 0–45)
BILIRUB SERPL-MCNC: 0.5 MG/DL (ref 0.2–1.3)
BUN SERPL-MCNC: 16 MG/DL (ref 7–30)
CALCIUM SERPL-MCNC: 9.6 MG/DL (ref 8.5–10.1)
CHLORIDE SERPL-SCNC: 107 MMOL/L (ref 94–109)
CHOLEST SERPL-MCNC: 189 MG/DL
CO2 SERPL-SCNC: 28 MMOL/L (ref 20–32)
CREAT SERPL-MCNC: 0.7 MG/DL (ref 0.52–1.04)
GFR SERPL CREATININE-BSD FRML MDRD: 87 ML/MIN/{1.73_M2}
GLUCOSE SERPL-MCNC: 104 MG/DL (ref 70–99)
HDLC SERPL-MCNC: 53 MG/DL
LDLC SERPL CALC-MCNC: 106 MG/DL
NONHDLC SERPL-MCNC: 136 MG/DL
POTASSIUM SERPL-SCNC: 4.4 MMOL/L (ref 3.4–5.3)
PROT SERPL-MCNC: 7.4 G/DL (ref 6.8–8.8)
SODIUM SERPL-SCNC: 139 MMOL/L (ref 133–144)
TRIGL SERPL-MCNC: 152 MG/DL

## 2021-01-01 NOTE — RESULT ENCOUNTER NOTE
It was a pleasure seeing you for your physical examination.  I wanted to get back to you with your test results.  I have enclosed a copy for your review.     I am happy to report that your cbc or complete blood count is normal with no signs of anemia, leukemia or platelet abnormalities. Your chemistry panel shows no diabetes.  Your blood salts, kidney tests, liver tests, and proteins are all fine.    Your total cholesterol is 189 with the normal range being below 200.  Your HDL or good cholesterol is 53 with the normal range being above 50.  Your LDL or bad cholesterol is 106.  Ideally given your heart history we want the ldl under 70.  I believe you have had problems with higher dose crestor in the past.  If you can tolerate 20mg daily I would try that.  If you were to have side effects we could always lower the dose.     I am happy to bring you this overall excellent report.   If you have any questions please call me.    Jesse Tejada M.D.

## 2021-02-12 ENCOUNTER — HOSPITAL ENCOUNTER (OUTPATIENT)
Dept: CARDIOLOGY | Facility: CLINIC | Age: 71
Discharge: HOME | End: 2021-02-12
Attending: INTERNAL MEDICINE | Admitting: INTERNAL MEDICINE
Payer: COMMERCIAL

## 2021-02-12 ENCOUNTER — TELEPHONE (OUTPATIENT)
Dept: CARDIOLOGY | Facility: CLINIC | Age: 71
End: 2021-02-12

## 2021-02-12 DIAGNOSIS — I25.10 CORONARY ARTERY DISEASE INVOLVING NATIVE CORONARY ARTERY OF NATIVE HEART WITHOUT ANGINA PECTORIS: Primary | ICD-10-CM

## 2021-02-12 PROCEDURE — 93018 CV STRESS TEST I&R ONLY: CPT | Performed by: INTERNAL MEDICINE

## 2021-02-12 PROCEDURE — 93350 STRESS TTE ONLY: CPT | Mod: 26 | Performed by: INTERNAL MEDICINE

## 2021-02-12 PROCEDURE — 255N000002 HC RX 255 OP 636: Performed by: INTERNAL MEDICINE

## 2021-02-12 PROCEDURE — 93325 DOPPLER ECHO COLOR FLOW MAPG: CPT | Mod: TC

## 2021-02-12 PROCEDURE — 93016 CV STRESS TEST SUPVJ ONLY: CPT | Performed by: INTERNAL MEDICINE

## 2021-02-12 PROCEDURE — 93325 DOPPLER ECHO COLOR FLOW MAPG: CPT | Mod: 26 | Performed by: INTERNAL MEDICINE

## 2021-02-12 PROCEDURE — 93321 DOPPLER ECHO F-UP/LMTD STD: CPT | Mod: 26 | Performed by: INTERNAL MEDICINE

## 2021-02-12 RX ADMIN — HUMAN ALBUMIN MICROSPHERES AND PERFLUTREN 9 ML: 10; .22 INJECTION, SOLUTION INTRAVENOUS at 11:32

## 2021-02-12 NOTE — TELEPHONE ENCOUNTER
Exercise stress echo 2-12-21  1. Above average exercise capacity, 98% max HR achieved.  2. The patient exhibited no chest pain during exercise.  3. 1mm ST depression with upsloping segment. No ischemic ST changes.  4. Rest echo: Normal left ventricular function and wall motion at rest. The  visual ejection fraction is estimated at 55-60%.  5. Stress echo: This was a normal stress echocardiogram with no evidence of  stress-induced ischemia. The visual ejection fraction is estimated at 65-70%.     No changes from stress echo 12/2016.    Last Dr. Bennett visit 6-2-2020   Next annual visit due June 2021.

## 2021-02-16 NOTE — TELEPHONE ENCOUNTER
Awaiting Dr. Bennett to review stress echo.  Patient called with Preliminary results and will be contacted with any concerns or recommendations.  Annual F/U due in June 2021.

## 2021-02-17 NOTE — TELEPHONE ENCOUNTER
Radha Bennett MD Theis, Marcie J, RN   Cc: TOMMY Oliva [facility] Heart Team 2   Caller: Unspecified (5 days ago,  1:17 PM)             Normal stress echo result.   Thank you.   CD      Spoke to patient, reviewed message from Dr Bennett and plan for f/up 6/2021. Pt verbalized understanding.

## 2021-03-03 ENCOUNTER — IMMUNIZATION (OUTPATIENT)
Dept: NURSING | Facility: CLINIC | Age: 71
End: 2021-03-03
Payer: COMMERCIAL

## 2021-03-03 PROCEDURE — 0011A PR COVID VAC MODERNA 100 MCG/0.5 ML IM: CPT

## 2021-03-03 PROCEDURE — 91301 PR COVID VAC MODERNA 100 MCG/0.5 ML IM: CPT

## 2021-03-24 ENCOUNTER — TELEPHONE (OUTPATIENT)
Dept: GASTROENTEROLOGY | Facility: CLINIC | Age: 71
End: 2021-03-24

## 2021-03-24 NOTE — TELEPHONE ENCOUNTER
Patient is scheduled for colonoscopy with Dr. Keith    Spoke with: Madonna Duque    Date of Procedure: 4/28/2021    Location:      Sedation Type C/S    Conscious Sedation- Needs  for 6 hours after the procedure  MAC/General-Needs  for 24 hours after procedure    Pre-op for Unit J MAC and OR not needed    Informed patient they will need an adult  yes  Cannot take any type of public or medical transportation alone    Informed Patient of COVID Test Requirement yes and scheduled    Preferred Pharmacy for Pre Prescription on chart    Confirmed Nurse will call to complete assessment yes    Additional comments: no

## 2021-03-31 ENCOUNTER — IMMUNIZATION (OUTPATIENT)
Dept: NURSING | Facility: CLINIC | Age: 71
End: 2021-03-31
Attending: INTERNAL MEDICINE
Payer: COMMERCIAL

## 2021-03-31 PROCEDURE — 0012A PR COVID VAC MODERNA 100 MCG/0.5 ML IM: CPT

## 2021-03-31 PROCEDURE — 91301 PR COVID VAC MODERNA 100 MCG/0.5 ML IM: CPT

## 2021-04-13 DIAGNOSIS — Z11.59 ENCOUNTER FOR SCREENING FOR OTHER VIRAL DISEASES: ICD-10-CM

## 2021-04-26 DIAGNOSIS — Z11.59 ENCOUNTER FOR SCREENING FOR OTHER VIRAL DISEASES: ICD-10-CM

## 2021-04-26 DIAGNOSIS — E78.00 HYPERCHOLESTEREMIA: ICD-10-CM

## 2021-04-26 LAB
LABORATORY COMMENT REPORT: NORMAL
SARS-COV-2 RNA RESP QL NAA+PROBE: NEGATIVE
SARS-COV-2 RNA RESP QL NAA+PROBE: NORMAL
SPECIMEN SOURCE: NORMAL
SPECIMEN SOURCE: NORMAL

## 2021-04-26 PROCEDURE — U0005 INFEC AGEN DETEC AMPLI PROBE: HCPCS | Performed by: COLON & RECTAL SURGERY

## 2021-04-26 PROCEDURE — U0003 INFECTIOUS AGENT DETECTION BY NUCLEIC ACID (DNA OR RNA); SEVERE ACUTE RESPIRATORY SYNDROME CORONAVIRUS 2 (SARS-COV-2) (CORONAVIRUS DISEASE [COVID-19]), AMPLIFIED PROBE TECHNIQUE, MAKING USE OF HIGH THROUGHPUT TECHNOLOGIES AS DESCRIBED BY CMS-2020-01-R: HCPCS | Performed by: COLON & RECTAL SURGERY

## 2021-04-28 ENCOUNTER — HOSPITAL ENCOUNTER (OUTPATIENT)
Facility: CLINIC | Age: 71
Discharge: HOME | End: 2021-04-28
Attending: COLON & RECTAL SURGERY | Admitting: COLON & RECTAL SURGERY
Payer: COMMERCIAL

## 2021-04-28 VITALS
DIASTOLIC BLOOD PRESSURE: 65 MMHG | HEART RATE: 58 BPM | BODY MASS INDEX: 27.64 KG/M2 | OXYGEN SATURATION: 95 % | RESPIRATION RATE: 31 BRPM | WEIGHT: 156 LBS | HEIGHT: 63 IN | SYSTOLIC BLOOD PRESSURE: 98 MMHG

## 2021-04-28 LAB — COLONOSCOPY: NORMAL

## 2021-04-28 PROCEDURE — G0500 MOD SEDAT ENDO SERVICE >5YRS: HCPCS | Performed by: COLON & RECTAL SURGERY

## 2021-04-28 PROCEDURE — 88305 TISSUE EXAM BY PATHOLOGIST: CPT | Mod: TC | Performed by: COLON & RECTAL SURGERY

## 2021-04-28 PROCEDURE — 88305 TISSUE EXAM BY PATHOLOGIST: CPT | Mod: 26 | Performed by: PATHOLOGY

## 2021-04-28 PROCEDURE — 45385 COLONOSCOPY W/LESION REMOVAL: CPT | Mod: PT | Performed by: COLON & RECTAL SURGERY

## 2021-04-28 PROCEDURE — 250N000011 HC RX IP 250 OP 636: Performed by: COLON & RECTAL SURGERY

## 2021-04-28 RX ORDER — LIDOCAINE 40 MG/G
CREAM TOPICAL
Status: DISCONTINUED | OUTPATIENT
Start: 2021-04-28 | End: 2021-04-28 | Stop reason: HOSPADM

## 2021-04-28 RX ORDER — ONDANSETRON 2 MG/ML
4 INJECTION INTRAMUSCULAR; INTRAVENOUS
Status: DISCONTINUED | OUTPATIENT
Start: 2021-04-28 | End: 2021-04-28 | Stop reason: HOSPADM

## 2021-04-28 RX ORDER — FENTANYL CITRATE 50 UG/ML
INJECTION, SOLUTION INTRAMUSCULAR; INTRAVENOUS PRN
Status: COMPLETED | OUTPATIENT
Start: 2021-04-28 | End: 2021-04-28

## 2021-04-28 RX ADMIN — FENTANYL CITRATE 100 MCG: 50 INJECTION, SOLUTION INTRAMUSCULAR; INTRAVENOUS at 10:01

## 2021-04-28 RX ADMIN — MIDAZOLAM 2 MG: 1 INJECTION INTRAMUSCULAR; INTRAVENOUS at 10:01

## 2021-04-28 ASSESSMENT — MIFFLIN-ST. JEOR: SCORE: 1195.7

## 2021-04-28 NOTE — H&P
Colon & Rectal Surgery History and Physical  Pre-Endoscopy Procedure Note    History of Present Illness   I have been asked by Dr. Tejada to evaluate this 70 year old female for colorectal polyp surveillance. She had a polyp removed duting colonoscopy in 2005 and a normal follow-up colonoscopy in 2011. She currently denies any abdominal pain, weight loss, bleeding per rectum, or recent change in bowel habits.    Past Medical History  Diagnosis Date     Chest pain 2/14    neg est echo     Colonic polyp 2005     Coronary artery disease involving native coronary artery of native heart without angina pectoris      Dizzy 2009    MRI brain normal     DJD (degenerative joint disease)     neck     Elevated blood sugar      GERD (gastroesophageal reflux disease) 2007    EGD - benign stricture     Hypercholesteremia 2010    Crestor and Lipitor intolerant     CORINA (iron deficiency anaemia) 2008    UGI, SBFT, and BE normal     Left ventricular diastolic dysfunction 1/16    echo done for fu and nl ef, no wma     Osteopenia     f/u dexa 2011 -1.9 fem neck and -1.2 spine, stable c/w 2007     Osteopenia of both hips 10/2019    screening dexa     PONV (postoperative nausea and vomiting)      STEMI (ST elevation myocardial infarction) (H) 12/2015    in [place] on cruise, lytics then PTCA of LAD, 2 stents, had Vfib arrest, EF on angio 67%, 40% RCA, no other dz; fu est echo 12/16 nl     Syncope     episode August 2017, possible vasovagal     Urethral strictures      Vitamin D deficiency        Past Surgical History  Procedure Laterality Date     APPENDECTOMY      appendectomy done at same time as oopherectomy     LIGATE FALLOPIAN TUBE,POSTPARTUM  85    Tubal Ligation     REMOVAL OF OVARY/TUBE(S)  80    Salpingo-Oophorectomy, Unilateral(endometriosis)     LAPAROTOMY EXPLORATORY  79    due to endomet, removed ovary     OTHER SURGICAL HISTORY  2015    stents x 2 to LAD     REPAIR PTOSIS BILATERAL  2/7/2012    Procedure:REPAIR PTOSIS  BILATERAL; BILATERAL UPPER LID MECHANICAL PTOSIS REPAIR,and ptosis repair; Surgeon:JOVANA BETANCUR; Location:[facility]        Medications  Medication Sig     aspirin 81 MG EC tablet Take 1 tablet (81 mg) by mouth daily     carvedilol (COREG) 3.125 MG tablet Take 1 tablet (3.125 mg) by mouth 2 times daily (with meals)     Cholecalciferol (VITAMIN D3 PO) Take 5,000 Units by mouth daily     coenzyme Q-10 (CO-Q10) 50 MG capsule Take 2 capsules (100 mg) by mouth daily     Cranberry 1000 MG CAPS Take 42,000 mg by mouth 2 times daily     omeprazole (PRILOSEC) 20 MG capsule Take 20 mg by mouth 2 times daily      rosuvastatin (CRESTOR) 10 MG tablet Take 1 tablet (10 mg) by mouth daily     nitroGLYcerin (NITROSTAT) 0.4 MG sublingual tablet Place 1 tablet (0.4 mg) under the tongue every 5 minutes as needed for chest pain If you are still having symptoms after 3 doses (15 minutes) call 911.       Allergies  Allergen Reactions     Crestor [Rosuvastatin]      Muscle aches, debilitating     Codeine Nausea and Vomiting     Shrimp Nausea and Vomiting        Family History   Family history includes Breast Cancer in her paternal aunt; Cancer in her father, mother, and paternal uncle; Cancer - colorectal in her paternal aunt and paternal aunt; Diabetes in her father, paternal aunt, paternal aunt, paternal aunt, paternal uncle, paternal uncle, and paternal uncle.     Social History   She reports that she quit smoking about 39 years ago. She has never used smokeless tobacco. She reports that she does not drink alcohol or use drugs.    Review of Systems   Constitutional:  No fever, weight change or fatigue.    Eyes:     No dry eyes or vision changes.   Ears/Nose/Throat/Neck:  No oral ulcers, sore throat or voice change.    Cardiovascular:   No palpitations, syncope, angina or edema.   Respiratory:    No chest pain, excessive sleepiness, shortness of breath or hemoptysis.    Gastrointestinal:   No abdominal pain, nausea, vomiting,  "diarrhea or heartburn.    Genitourinary:   No dysuria, hematuria, urinary retention or urinary frequency.   Musculoskeletal:  No joint swelling or arthralgias.    Dermatologic:  No skin rash or other skin changes.   Neurologic:    No focal weakness or numbness. No neuropathy.   Psychiatric:    No depression, anxiety, suicidal ideation, or paranoid ideation.   Endocrine:   No cold or heat intolerance, polydipsia, hirsutism, change in libido, or flushing.   Hematology/Lymphatic:  No bleeding or lymphadenopathy.    Allergy/Immunology:  No rhinitis or hives.     Physical Exam   Vitals:  /71, HR 64 RR 12  height 1.607 m (5' 3.25\"), weight 70.8 kg (156 lb), SpO2 98 %, not currently breastfeeding.    General:  Alert and oriented to person, place and time   Airway: Normal oropharyngeal airway and neck mobility   Lungs:  Clear bilaterally   Heart:  Regular rate and rhythm   Abdomen: Soft, NT, ND, no masses   Extremities: Warm, good capillary refill    ASA Grade: II (mild systemic disease)    Impression: Cleared for use of conscious sedation for colorectal polyp surveillance    Plan: Proceed with colonoscopy     Angela Keith MD  [facility]  [phone]  "

## 2021-04-29 LAB — COPATH REPORT: NORMAL

## 2021-06-23 ENCOUNTER — RESEARCH ENCOUNTER (OUTPATIENT)
Dept: CARDIOLOGY | Facility: CLINIC | Age: 71
End: 2021-06-23

## 2021-06-23 NOTE — PROGRESS NOTES
Research Consent Note:  IRB# [id number]   PI: Surjit Perez  : Cindy Early RN at [phone]      Trial Name: Dorothy 1002-043 CLEAR OUTCOMES [id number]  Estimated duration of study: 2-5 years      I met with Madonna at the [facility] Clinical Research Unit for a research visit and re-consent discussion of the Clear Outcomes trial.   The joint decision making document was referred to for points of consent discussion to be completed.  The current IRB approved consent form was reviewed and discussed at length with the patient. This included purpose, research hypothesis, nature of the research, risks & benefits, and procedures required for continued enrollment. Confidentiality issues, compensation for injury, and alternative procedures available were also explained. Subject was informed that participation is voluntary and that refusal to participate will involve no penalty or decrease benefits to which the subject is otherwise entitled. Patient had the opportunity to read the entire written consent, ask questions and express concerns and offered sufficient time to consider the research trial. Patient was able to clearly state what study participation involved and the associated requirements. All questions and concerns were addressed. Patient voluntarily signed the consent form on 21 JUN 2021 @ 10:00  and was given a copy of the signed form.    .  Consent version: 02.18.2021 Signed on 21 JUN 2021

## 2021-09-05 ENCOUNTER — HEALTH MAINTENANCE LETTER (OUTPATIENT)
Age: 71
End: 2021-09-05

## 2021-09-24 ENCOUNTER — TELEPHONE (OUTPATIENT)
Dept: CARDIOLOGY | Facility: CLINIC | Age: 71
End: 2021-09-24

## 2021-09-24 NOTE — TELEPHONE ENCOUNTER
Call from patient wondering if she needed SBE prior to a tooth extraction as she has stents. Reviewed with patient that stents do not require SBE; this is used for specific heart issues such as an artifical heart valve. Patient verbalized understanding and agreed with plan.    Reminded patient to call scheduling to set up her annual visit and establish care with a new cardiologist after Dr. Bennett's [facility].

## 2021-12-14 ENCOUNTER — ANCILLARY PROCEDURE (OUTPATIENT)
Dept: MAMMOGRAPHY | Facility: CLINIC | Age: 71
End: 2021-12-14
Attending: INTERNAL MEDICINE
Payer: COMMERCIAL

## 2021-12-14 DIAGNOSIS — Z12.31 VISIT FOR SCREENING MAMMOGRAM: ICD-10-CM

## 2021-12-14 PROCEDURE — 77063 BREAST TOMOSYNTHESIS BI: CPT | Mod: TC | Performed by: RADIOLOGY

## 2021-12-14 PROCEDURE — 77067 SCR MAMMO BI INCL CAD: CPT | Mod: TC | Performed by: RADIOLOGY

## 2021-12-31 ASSESSMENT — ENCOUNTER SYMPTOMS
CONSTIPATION: 0
HEMATURIA: 0
HEARTBURN: 0
FEVER: 0
DIZZINESS: 0
SHORTNESS OF BREATH: 0
ARTHRALGIAS: 1
EYE PAIN: 0
PALPITATIONS: 0
HEADACHES: 1
PARESTHESIAS: 0
DYSURIA: 0
NERVOUS/ANXIOUS: 0
MYALGIAS: 1
WEAKNESS: 0
NAUSEA: 0
JOINT SWELLING: 0
CHILLS: 0
HEMATOCHEZIA: 0
FREQUENCY: 0
SORE THROAT: 0
COUGH: 0
ABDOMINAL PAIN: 0
BREAST MASS: 0
DIARRHEA: 0

## 2021-12-31 ASSESSMENT — ACTIVITIES OF DAILY LIVING (ADL): CURRENT_FUNCTION: NO ASSISTANCE NEEDED

## 2022-01-03 ENCOUNTER — OFFICE VISIT (OUTPATIENT)
Dept: FAMILY MEDICINE | Facility: CLINIC | Age: 72
End: 2022-01-03
Payer: COMMERCIAL

## 2022-01-03 VITALS
OXYGEN SATURATION: 97 % | RESPIRATION RATE: 12 BRPM | SYSTOLIC BLOOD PRESSURE: 106 MMHG | HEART RATE: 74 BPM | BODY MASS INDEX: 26.94 KG/M2 | DIASTOLIC BLOOD PRESSURE: 72 MMHG | TEMPERATURE: 96.9 F | WEIGHT: 157.8 LBS | HEIGHT: 64 IN

## 2022-01-03 DIAGNOSIS — K63.5 POLYP OF COLON, UNSPECIFIED PART OF COLON, UNSPECIFIED TYPE: ICD-10-CM

## 2022-01-03 DIAGNOSIS — I25.10 CORONARY ARTERY DISEASE INVOLVING NATIVE CORONARY ARTERY OF NATIVE HEART WITHOUT ANGINA PECTORIS: ICD-10-CM

## 2022-01-03 DIAGNOSIS — E78.00 HYPERCHOLESTEREMIA: ICD-10-CM

## 2022-01-03 DIAGNOSIS — I21.02 ST ELEVATION MYOCARDIAL INFARCTION INVOLVING LEFT ANTERIOR DESCENDING (LAD) CORONARY ARTERY (H): ICD-10-CM

## 2022-01-03 DIAGNOSIS — R73.9 ELEVATED BLOOD SUGAR: ICD-10-CM

## 2022-01-03 DIAGNOSIS — I25.10 CORONARY ARTERY DISEASE INVOLVING NATIVE HEART WITHOUT ANGINA PECTORIS, UNSPECIFIED VESSEL OR LESION TYPE: ICD-10-CM

## 2022-01-03 DIAGNOSIS — I49.01 VENTRICULAR FIBRILLATION (H): ICD-10-CM

## 2022-01-03 DIAGNOSIS — E78.2 MIXED HYPERLIPIDEMIA: ICD-10-CM

## 2022-01-03 DIAGNOSIS — K21.9 GASTROESOPHAGEAL REFLUX DISEASE WITHOUT ESOPHAGITIS: ICD-10-CM

## 2022-01-03 DIAGNOSIS — M85.852 OSTEOPENIA OF BOTH HIPS: ICD-10-CM

## 2022-01-03 DIAGNOSIS — E55.9 VITAMIN D DEFICIENCY: ICD-10-CM

## 2022-01-03 DIAGNOSIS — Z00.00 MEDICARE ANNUAL WELLNESS VISIT, SUBSEQUENT: Primary | ICD-10-CM

## 2022-01-03 DIAGNOSIS — M85.851 OSTEOPENIA OF BOTH HIPS: ICD-10-CM

## 2022-01-03 PROBLEM — D12.6 TUBULAR ADENOMA OF COLON: Status: ACTIVE | Noted: 2021-04-01

## 2022-01-03 LAB
ALBUMIN SERPL-MCNC: 4 G/DL (ref 3.4–5)
ALP SERPL-CCNC: 88 U/L (ref 40–150)
ALT SERPL W P-5'-P-CCNC: 24 U/L (ref 0–50)
ANION GAP SERPL CALCULATED.3IONS-SCNC: 5 MMOL/L (ref 3–14)
AST SERPL W P-5'-P-CCNC: 23 U/L (ref 0–45)
BILIRUB SERPL-MCNC: 0.7 MG/DL (ref 0.2–1.3)
BUN SERPL-MCNC: 22 MG/DL (ref 7–30)
CALCIUM SERPL-MCNC: 9.5 MG/DL (ref 8.5–10.1)
CHLORIDE BLD-SCNC: 104 MMOL/L (ref 94–109)
CHOLEST SERPL-MCNC: 190 MG/DL
CO2 SERPL-SCNC: 29 MMOL/L (ref 20–32)
CREAT SERPL-MCNC: 0.8 MG/DL (ref 0.52–1.04)
DEPRECATED CALCIDIOL+CALCIFEROL SERPL-MC: 66 UG/L (ref 20–75)
ERYTHROCYTE [DISTWIDTH] IN BLOOD BY AUTOMATED COUNT: 14.1 % (ref 10–15)
FASTING STATUS PATIENT QL REPORTED: YES
GFR SERPL CREATININE-BSD FRML MDRD: 78 ML/MIN/1.73M2
GLUCOSE BLD-MCNC: 120 MG/DL (ref 70–99)
HCT VFR BLD AUTO: 41.3 % (ref 35–47)
HDLC SERPL-MCNC: 59 MG/DL
HGB BLD-MCNC: 13.5 G/DL (ref 11.7–15.7)
LDLC SERPL CALC-MCNC: 113 MG/DL
MCH RBC QN AUTO: 30.1 PG (ref 26.5–33)
MCHC RBC AUTO-ENTMCNC: 32.7 G/DL (ref 31.5–36.5)
MCV RBC AUTO: 92 FL (ref 78–100)
NONHDLC SERPL-MCNC: 131 MG/DL
PLATELET # BLD AUTO: 270 10E3/UL (ref 150–450)
POTASSIUM BLD-SCNC: 4.5 MMOL/L (ref 3.4–5.3)
PROT SERPL-MCNC: 7.7 G/DL (ref 6.8–8.8)
RBC # BLD AUTO: 4.49 10E6/UL (ref 3.8–5.2)
SODIUM SERPL-SCNC: 138 MMOL/L (ref 133–144)
TRIGL SERPL-MCNC: 92 MG/DL
WBC # BLD AUTO: 5.9 10E3/UL (ref 4–11)

## 2022-01-03 PROCEDURE — 83036 HEMOGLOBIN GLYCOSYLATED A1C: CPT | Performed by: INTERNAL MEDICINE

## 2022-01-03 PROCEDURE — 36415 COLL VENOUS BLD VENIPUNCTURE: CPT | Performed by: INTERNAL MEDICINE

## 2022-01-03 PROCEDURE — 82306 VITAMIN D 25 HYDROXY: CPT | Performed by: INTERNAL MEDICINE

## 2022-01-03 PROCEDURE — 80061 LIPID PANEL: CPT | Performed by: INTERNAL MEDICINE

## 2022-01-03 PROCEDURE — G0439 PPPS, SUBSEQ VISIT: HCPCS | Performed by: INTERNAL MEDICINE

## 2022-01-03 PROCEDURE — 80053 COMPREHEN METABOLIC PANEL: CPT | Performed by: INTERNAL MEDICINE

## 2022-01-03 PROCEDURE — 85027 COMPLETE CBC AUTOMATED: CPT | Performed by: INTERNAL MEDICINE

## 2022-01-03 RX ORDER — CARVEDILOL 3.12 MG/1
3.12 TABLET ORAL 2 TIMES DAILY WITH MEALS
Qty: 180 TABLET | Refills: 3 | Status: SHIPPED | OUTPATIENT
Start: 2022-01-03 | End: 2023-02-10

## 2022-01-03 RX ORDER — ROSUVASTATIN CALCIUM 10 MG/1
10 TABLET, COATED ORAL DAILY
Qty: 90 TABLET | Refills: 3 | Status: SHIPPED | OUTPATIENT
Start: 2022-01-03 | End: 2023-02-10

## 2022-01-03 RX ORDER — NITROGLYCERIN 0.4 MG/1
0.4 TABLET SUBLINGUAL EVERY 5 MIN PRN
Qty: 25 TABLET | Refills: 0 | Status: SHIPPED | OUTPATIENT
Start: 2022-01-03

## 2022-01-03 ASSESSMENT — ENCOUNTER SYMPTOMS
PARESTHESIAS: 0
CONSTIPATION: 0
MYALGIAS: 1
DIZZINESS: 0
SHORTNESS OF BREATH: 0
FREQUENCY: 0
EYE PAIN: 0
FEVER: 0
CHILLS: 0
BREAST MASS: 0
HEARTBURN: 0
WEAKNESS: 0
DIARRHEA: 0
ABDOMINAL PAIN: 0
HEADACHES: 1
COUGH: 0
NAUSEA: 0
ARTHRALGIAS: 1
NERVOUS/ANXIOUS: 0
HEMATURIA: 0
PALPITATIONS: 0
JOINT SWELLING: 0
HEMATOCHEZIA: 0
DYSURIA: 0
SORE THROAT: 0

## 2022-01-03 ASSESSMENT — PAIN SCALES - GENERAL: PAINLEVEL: NO PAIN (0)

## 2022-01-03 ASSESSMENT — MIFFLIN-ST. JEOR: SCORE: 1210.78

## 2022-01-03 ASSESSMENT — ACTIVITIES OF DAILY LIVING (ADL): CURRENT_FUNCTION: NO ASSISTANCE NEEDED

## 2022-01-03 NOTE — PROGRESS NOTES
SUBJECTIVE:   Madonna Duque is a 71 year old female who presents for Preventive Visit.    She is doing super, works out reg, no c/o.  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               Past Medical History:      Past Medical History:   Diagnosis Date     Abdominal discomfort 12/2013    ct abd and pelvis no cause found     Cancer (H) 2017    Basal cell left forarm     Chest pain 02/2014    neg est echo     Colonic polyp 2005    fu 2011 nl and to fu 2021     Coronary artery disease involving native coronary artery of native heart without angina pectoris 2015    NSTEMI/Vfib arrest/stents x2     Dizzy 2009    mri brain nl     DJD (degenerative joint disease)     neck     Elevated blood sugar      GERD (gastroesophageal reflux disease) 2007    egd, benign stricture     Hypercholesteremia 2010    crestor intolerant, lipitor intolerant     CORINA (iron deficiency anaemia) 2008    ugi and sbft nl, be nl     Left ventricular diastolic dysfunction 01/2016    echo done for fu and nl ef, no wma     Osteopenia     fu dexa 2011 -1.9 fem neck and -1.2 spine, stable c/w 2007     Osteopenia of both hips 10/2019    screening dexa     PONV (postoperative nausea and vomiting)      STEMI (ST elevation myocardial infarction) (H) 12/2015    in [place] on cruise, lytics then ptca of lad, 2 stents, had vfib arrest, ef on angio 67%, 40% rca, no other dz; fu est echo 12/16 nl     Syncope     episode August 2017, possible vasovagal     Tubular adenoma of colon 04/2021    fu 5 years     Urethral strictures      Ventricular fibrillation (H) 12/2015    during mi     Vitamin D deficiency              Past Surgical History:      Past Surgical History:   Procedure Laterality Date     APPENDECTOMY      appendectomy done at same time as oopherectomy     CARDIAC SURGERY  2015    2 stents LAD     COLONOSCOPY  04/28/2021    2 pre cancerous polyps     COSMETIC SURGERY  05/05/2021    Moes procedure, squamous cell nose     HC REMOVAL OF OVARY/TUBE(S)  80     Salpingo-Oophorectomy, Unilateral(endometriosis)     LAPAROTOMY EXPLORATORY  79    due to endomet, removed ovary     OTHER SURGICAL HISTORY  2015    stents x2 to LAD     REPAIR PTOSIS BILATERAL  2012    Procedure:REPAIR PTOSIS BILATERAL; BILATERAL UPPER LID MECHANICAL PTOSIS REPAIR,and ptosis repair; Surgeon:JOVANA BETANCUR; Location[facility] LIGATE FALLOPIAN TUBE,POSTPARTUM  84    Tubal Ligation             Social History:     Social History     Socioeconomic History     Marital status:      Spouse name: Not on file     Number of children: 1     Years of education: Not on file     Highest education level: Not on file   Occupational History     Occupation: rn, retired      Employer: [employer],[address]   Tobacco Use     Smoking status: Former Smoker     Packs/day: 0.50     Years: 10.00     Pack years: 5.00     Types: Cigarettes     Start date: 10/1/1973     Quit date: 1982     Years since quittin.9     Smokeless tobacco: Never Used     Tobacco comment: quit 23 years ago   Substance and Sexual Activity     Alcohol use: No     Alcohol/week: 0.0 standard drinks     Drug use: No     Sexual activity: Yes     Partners: Male     Birth control/protection: Post-menopausal   Other Topics Concern     Parent/sibling w/ CABG, MI or angioplasty before 65F 55M? No      Service Not Asked     Blood Transfusions Not Asked     Caffeine Concern Not Asked     Occupational Exposure Not Asked     Hobby Hazards Not Asked     Sleep Concern Not Asked     Stress Concern Not Asked     Weight Concern Not Asked     Special Diet No     Comment: heart healthy, weight watches      Back Care Not Asked     Exercise No     Bike Helmet Not Asked     Seat Belt Not Asked     Self-Exams Not Asked   Social History Narrative     Not on file     Social Determinants of Health     Financial Resource Strain: Not on file   Food Insecurity: Not on file   Transportation Needs: Not on file  "  Physical Activity: Not on file   Stress: Not on file   Social Connections: Not on file   Intimate Partner Violence: Not on file   Housing Stability: Not on file             Family History:   reviewed         Allergies:     Allergies   Allergen Reactions     Crestor [Rosuvastatin]      Muscle aches, debilitating     Codeine Nausea and Vomiting     Shrimp Nausea and Vomiting             Medications:     Current Outpatient Medications   Medication Sig Dispense Refill     aspirin 81 MG EC tablet Take 1 tablet (81 mg) by mouth daily 90 tablet 3     carvedilol (COREG) 3.125 MG tablet Take 1 tablet (3.125 mg) by mouth 2 times daily (with meals) 180 tablet 3     Cholecalciferol (VITAMIN D3 PO) Take 5,000 Units by mouth daily       coenzyme Q-10 (CO-Q10) 50 MG capsule Take 2 capsules (100 mg) by mouth daily 100 capsule      Cranberry 1000 MG CAPS Take 42,000 mg by mouth 2 times daily       nitroGLYcerin (NITROSTAT) 0.4 MG sublingual tablet Place 1 tablet (0.4 mg) under the tongue every 5 minutes as needed for chest pain If you are still having symptoms after 3 doses (15 minutes) call 911. 25 tablet 0     omeprazole (PRILOSEC) 20 MG capsule Take 20 mg by mouth 2 times daily        rosuvastatin (CRESTOR) 10 MG tablet Take 1 tablet (10 mg) by mouth daily 90 tablet 3               Review of Systems:   The 10 point Review of Systems is negative other than noted in the HPI           Physical Exam:   Blood pressure 106/72, pulse 74, temperature 96.9  F (36.1  C), temperature source Tympanic, resp. rate 12, height 1.626 m (5' 4\"), weight 71.6 kg (157 lb 12.8 oz), SpO2 97 %, not currently breastfeeding.    Exam:  Constitutional: healthy appearing, alert and in no distress  Heent: Normocephalic. Head without obvious masses or lesions. PERRLDC, EOMI. Mouth exam within normal limits: tongue, mucous membranes, posterior pharynx all normal, no lesions or abnormalities seen.  Tm's and canals within normal limits bilaterally. Neck supple, " "no nuchal rigidity or masses. No supraclavicular, or cervical adenopathy. Thyroid symmetric, no masses.  Cardiovascular: Regular rate and rhythm, no murmer, rub or gallops.  JVP not elevated, no edema.  Carotids within normal limits bilaterally, no bruits.  Respiratory: Normal respiratory effort.  Lungs clear, normal flow, no wheezing or crackles.  Breasts: Normal bilaterally.  No masses or lesions.  Nipples within normal limits.  No axillary lesions or nodes.  My M.A. Was present during this part of the examination.  Gastrointestinal: Normal active bowel sounds.   Soft, not tender, no masses, guarding or rebound.  No hepatosplenomegaly.   Musculoskeletal: extremities normal, no gross deformities noted.  Skin: no suspicious lesions or rashes   Neurologic: Mental status within normal limits.  Speech fluent.  No gross motor abnormalities and gait intact.  Psychiatric: mentation appears normal and affect normal.         Data:   Labs sent        Assessment:   1. Normal complete physical exam  2. Cad, stable  3. Vfib, no issues  4. Osteopenia, calcium and vit d, follow up next year  5. Gerd, no issues  6. Elevated sugar, follow up labs  7. Low vit d, follow up labs  8. Colon polyp, follow up as noted  9. Elevated cholesterol, on statin  10. hcm         Plan:   Up to date immunizations  Up to date mammogram  Up to date colon  Exercise, diet  Letter with labs      Jesse Tejada M.D.              Patient has been advised of split billing requirements and indicates understanding: Yes   Are you in the first 12 months of your Medicare coverage?  No    Healthy Habits:     In general, how would you rate your overall health?  Good    Frequency of exercise:  4-5 days/week    Duration of exercise:  45-60 minutes    Do you usually eat at least 4 servings of fruit and vegetables a day, include whole grains    & fiber and avoid regularly eating high fat or \"junk\" foods?  Yes    Taking medications regularly:  Yes    Medication side " effects:  None    Ability to successfully perform activities of daily living:  No assistance needed    Home Safety:  No safety concerns identified    Hearing Impairment:  No hearing concerns    In the past 6 months, have you been bothered by leaking of urine? Yes    In general, how would you rate your overall mental or emotional health?  Excellent      PHQ-2 Total Score: 0    Additional concerns today:  No    Do you feel safe in your environment? Yes    Have you ever done Advance Care Planning? (For example, a Health Directive, POLST, or a discussion with a medical provider or your loved ones about your wishes): Yes, patient states has an Advance Care Planning document and will bring a copy to the clinic.       Fall risk  Fallen 2 or more times in the past year?: No  Any fall with injury in the past year?: No    Cognitive Screening   1) Repeat 3 items (Leader, Season, Table)    2) Clock draw: NORMAL  3) 3 item recall: Recalls 3 objects  Results: 3 items recalled: COGNITIVE IMPAIRMENT LESS LIKELY    Mini-CogTM Copyright S Silvino. Licensed by the author for use in [facility]; reprinted with permission (queta@[facility]). All rights reserved.      Do you have sleep apnea, excessive snoring or daytime drowsiness?: no    Reviewed and updated as needed this visit by clinical staff  Tobacco  Allergies  Meds  Problems  Med Hx  Surg Hx  Fam Hx         Reviewed and updated as needed this visit by Provider    Meds  Problems           Social History     Tobacco Use     Smoking status: Former Smoker     Packs/day: 0.50     Years: 10.00     Pack years: 5.00     Types: Cigarettes     Start date: 10/1/1973     Quit date: 1982     Years since quittin.9     Smokeless tobacco: Never Used     Tobacco comment: quit 23 years ago   Substance Use Topics     Alcohol use: No     Alcohol/week: 0.0 standard drinks         Alcohol Use 2021   Prescreen: >3 drinks/day or >7 drinks/week? Not Applicable  "  Prescreen: >3 drinks/day or >7 drinks/week? -               Current providers sharing in care for this patient include:   Patient Care Team:  Jesse Tejada MD as PCP - General  Jesse Tejada MD as Assigned PCP    The following health maintenance items are reviewed in Epic and correct as of today:  Health Maintenance Due   Topic Date Due     ANNUAL REVIEW OF HM ORDERS  Never done     FALL RISK ASSESSMENT  12/31/2021               Review of Systems   Constitutional: Negative for chills and fever.   HENT: Negative for congestion, ear pain, hearing loss and sore throat.    Eyes: Positive for visual disturbance. Negative for pain.   Respiratory: Negative for cough and shortness of breath.    Cardiovascular: Negative for chest pain, palpitations and peripheral edema.   Gastrointestinal: Negative for abdominal pain, constipation, diarrhea, heartburn, hematochezia and nausea.   Breasts:  Negative for tenderness, breast mass and discharge.   Genitourinary: Positive for urgency. Negative for dysuria, frequency, genital sores, hematuria, pelvic pain, vaginal bleeding and vaginal discharge.   Musculoskeletal: Positive for arthralgias and myalgias. Negative for joint swelling.   Skin: Negative for rash.   Neurological: Positive for headaches. Negative for dizziness, weakness and paresthesias.   Psychiatric/Behavioral: Negative for mood changes. The patient is not nervous/anxious.          OBJECTIVE:   /72 (BP Location: Left arm, Cuff Size: Adult Regular)   Pulse 74   Temp 96.9  F (36.1  C) (Tympanic)   Resp 12   Ht 1.626 m (5' 4\")   Wt 71.6 kg (157 lb 12.8 oz)   SpO2 97%   BMI 27.09 kg/m   Estimated body mass index is 27.09 kg/m  as calculated from the following:    Height as of this encounter: 1.626 m (5' 4\").    Weight as of this encounter: 71.6 kg (157 lb 12.8 oz).  Physical Exam          ASSESSMENT / PLAN:       Patient has been advised of split billing requirements and indicates understanding: " "No  COUNSELING:  Reviewed preventive health counseling, as reflected in patient instructions       Regular exercise       Healthy diet/nutrition    Estimated body mass index is 27.09 kg/m  as calculated from the following:    Height as of this encounter: 1.626 m (5' 4\").    Weight as of this encounter: 71.6 kg (157 lb 12.8 oz).        She reports that she quit smoking about 39 years ago. Her smoking use included cigarettes. She started smoking about 48 years ago. She has a 5.00 pack-year smoking history. She has never used smokeless tobacco.      Appropriate preventive services were discussed with this patient, including applicable screening as appropriate for cardiovascular disease, diabetes, osteopenia/osteoporosis, and glaucoma.  As appropriate for age/gender, discussed screening for colorectal cancer, prostate cancer, breast cancer, and cervical cancer. Checklist reviewing preventive services available has been given to the patient.    Reviewed patients plan of care and provided an AVS. The Basic Care Plan (routine screening as documented in Health Maintenance) for Madonna meets the Care Plan requirement. This Care Plan has been established and reviewed with the Patient.    Counseling Resources:  ATP IV Guidelines  Pooled Cohorts Equation Calculator  Breast Cancer Risk Calculator  Breast Cancer: Medication to Reduce Risk  FRAX Risk Assessment  ICSI Preventive Guidelines  Dietary Guidelines for Americans, 2010  [employer]'s MyPlate  ASA Prophylaxis  Lung CA Screening    Jesse Tejada MD  [facility]    Identified Health Risks:  "

## 2022-01-04 LAB — HBA1C MFR BLD: 6.1 % (ref 0–5.6)

## 2022-01-05 NOTE — RESULT ENCOUNTER NOTE
It was nice to see you.  Your should be able to view the lab results.    As you can see for the most part the labs look good, but I am worried about the sugar and hemoglobin a1c.  Please be sure to exercise, eat a healthy diet, and keep your weight down for this.  I would like to repeat this in 6 months.    I would like to see you cholesterol lower.  I apologize, but I can not recall if you are taking the crestor daily or not or if you are having any aches with it.  Please let me know.    As you can see overall the labs are fine.  If you have any questions please call me.    Jesse Tejada M.D.

## 2022-01-25 ENCOUNTER — OFFICE VISIT (OUTPATIENT)
Dept: CARDIOLOGY | Facility: CLINIC | Age: 72
End: 2022-01-25
Payer: COMMERCIAL

## 2022-01-25 VITALS
HEART RATE: 72 BPM | BODY MASS INDEX: 26.98 KG/M2 | HEIGHT: 64 IN | SYSTOLIC BLOOD PRESSURE: 128 MMHG | DIASTOLIC BLOOD PRESSURE: 77 MMHG | WEIGHT: 158 LBS

## 2022-01-25 DIAGNOSIS — I25.10 CORONARY ARTERY DISEASE INVOLVING NATIVE CORONARY ARTERY OF NATIVE HEART WITHOUT ANGINA PECTORIS: Primary | ICD-10-CM

## 2022-01-25 PROCEDURE — 93000 ELECTROCARDIOGRAM COMPLETE: CPT | Performed by: INTERNAL MEDICINE

## 2022-01-25 PROCEDURE — 99214 OFFICE O/P EST MOD 30 MIN: CPT | Performed by: INTERNAL MEDICINE

## 2022-01-25 ASSESSMENT — MIFFLIN-ST. JEOR: SCORE: 1215.65

## 2022-01-25 NOTE — PROGRESS NOTES
CARDIOLOGY CLINIC VISIT  DATE OF SERVICE:  January 25, 2022      PRIMARY CARE PHYSICIAN:  Jesse Tejada    HISTORY OF PRESENT ILLNESS:  Ms. Madonna Duque  is 69 years old and is followed for history of coronary disease, prior coronary intervention and prior myocardial infarct who presents to clinic today for follow up.  She was last seen by Dr. Bennett in June 2020 and this is my first visit with Madonna.       In 2015, she was in [place].  She experienced an acute anterior ST elevation myocardial infarct complicated by ventricular fibrillatory arrest.  She was evacuated by air and eventually underwent coronary angiography which resulted in placement of 2 stents in the LAD.  She has done well since that time without recurrent symptoms.      She has dyslipidemia but developed severe myalgias with 40 mg of rosuvastatin.  She then was in a cholesterol therapy study at [facility] run by Dr. Surjit Perez that was discontinued, then was on therapy with Praluent.  She developed leg aching and weakness with Praluent and it necessitated the discontinuation of the medication though her LDL had fallen to 81 mg/dl. She has been significantly intolerant of statins due to severe myalgias with Zetia, rosuvastatin, atorvastatin and simvastatin.  With her last visit, rosuvastatin 5 mg daily was started with concurrent use of coenzyme Q 10 and her LDL has returned at 91 mg/dL (HDL 58 mg/dL liter) as measured last week.     She continues on aspirin and beta blockade.  She completed a stress echocardiogram in 2016 with an excellent exercise time and no evidence of ischemia.     Her carvedilol was decreased due to fatigue and light-headedness.    In follow up today, Madonna reports feeling very well. She denies any exertional chest pain, chest discomfort or shortness of breath. She denies any orthopnea, PND or lower extremity edema. She is entirely without cardiovascular complaints.      PAST MEDICAL HISTORY:  Past Medical History:    Diagnosis Date     Abdominal discomfort 12/2013    ct abd and pelvis no cause found     Cancer (H) 2017    Basal cell left forarm     Chest pain 02/2014    neg est echo     Colonic polyp 2005    fu 2011 nl and to fu 2021     Coronary artery disease involving native coronary artery of native heart without angina pectoris 2015    NSTEMI/Vfib arrest/stents x2     Dizzy 2009    mri brain nl     DJD (degenerative joint disease)     neck     Elevated blood sugar      GERD (gastroesophageal reflux disease) 2007    egd, benign stricture     Hypercholesteremia 2010    crestor intolerant, lipitor intolerant     CORINA (iron deficiency anaemia) 2008    ugi and sbft nl, be nl     Left ventricular diastolic dysfunction 01/2016    echo done for fu and nl ef, no wma     Osteopenia     fu dexa 2011 -1.9 fem neck and -1.2 spine, stable c/w 2007     Osteopenia of both hips 10/2019    screening dexa     PONV (postoperative nausea and vomiting)      STEMI (ST elevation myocardial infarction) (H) 12/2015    in [place] on cruise, lytics then ptca of lad, 2 stents, had vfib arrest, ef on angio 67%, 40% rca, no other dz; fu est echo 12/16 nl     Syncope     episode August 2017, possible vasovagal     Tubular adenoma of colon 04/2021    fu 5 years     Urethral strictures      Ventricular fibrillation (H) 12/2015    during mi     Vitamin D deficiency        MEDICATIONS:  Current Outpatient Medications   Medication     aspirin 81 MG EC tablet     carvedilol (COREG) 3.125 MG tablet     Cholecalciferol (VITAMIN D3 PO)     coenzyme Q-10 (CO-Q10) 50 MG capsule     Cranberry 1000 MG CAPS     nitroGLYcerin (NITROSTAT) 0.4 MG sublingual tablet     omeprazole (PRILOSEC) 20 MG capsule     rosuvastatin (CRESTOR) 10 MG tablet     No current facility-administered medications for this visit.       ALLERGIES:  Allergies   Allergen Reactions     Crestor [Rosuvastatin]      Muscle aches, debilitating     Codeine Nausea and Vomiting     Shrimp Nausea and  Vomiting       SOCIAL HISTORY:  I have reviewed this patient's social history and updated it with pertinent information if needed. Madonna Duque  reports that she quit smoking about 40 years ago. Her smoking use included cigarettes. She started smoking about 48 years ago. She has a 5.00 pack-year smoking history. She has never used smokeless tobacco. She reports that she does not drink alcohol and does not use drugs.    FAMILY HISTORY:  I have reviewed this patient's family history and updated it with pertinent information if needed.   Family History   Problem Relation Age of Onset     Cancer Mother         CLL     Hypertension Mother      Cerebrovascular Disease Mother      Diabetes Father      Cancer Father         Castlemans     Other Cancer Father         Castelman's disease     Other Cancer Sister         Endometrial cancer     Thyroid Disease Sister      Diabetes Brother      Diabetes Paternal Aunt      Diabetes Paternal Aunt      Diabetes Paternal Aunt      Breast Cancer Paternal Aunt      Cancer - colorectal Paternal Aunt      Cancer - colorectal Paternal Aunt      Diabetes Paternal Uncle      Diabetes Paternal Uncle      Diabetes Paternal Uncle      Cancer Paternal Uncle         bladder       REVIEW OF SYSTEMS:  A complete ROS was obtained and the pertinent positives are outlined in the history of present illness above.  The remainder of systems is negative.      PHYSICAL EXAM:                     Vital Signs with Ranges     0 lbs 0 oz    Constitutional: awake, alert, no distress  Eyes: PERRL, sclera nonicteric  ENT: trachea midline  Respiratory: CTAB  Cardiovascular: RRR no m/r/g, no JVD  GI: nondistended, nontender, bowel sounds present  Lymph/Hematologic: no lymphadenopathy  Skin: dry, no rash  Musculoskeletal: good muscle tone, no edema bilaterally  Neurologic: no focal deficits  Neuropsychiatric: appropriate affact    DATA:  Labs:    HDL 59    ASSESSMENT:  1.  Coronary artery disease:   S/P anterior STEMI complicated by VF arrest; 2 KISHA to LAD.  Exercise stress echocardiogram demonstrated normal LV function with no inducible ischemia.  She is feeling well without symptoms concerning for obstructive CAD.  2.  Hyperlipidemia:  .  Difficulty with statins in the past and did not tolerate praluent, but currently tolerating rosuvastatin 10 mg daily with CoQ10.  She has participated in a number of studies through Dr. Perez' lipid clinic.  She has been on zetia in the past, which did not change her LDL and it was ultimatel stopped.        RECOMMENDATIONS:  1. Continue current cardiac meds; no changes made today.  2.  Plan for follow up in 12 months or before than as needed.      Delmy Mccurdy MD [facility]  January 25, 2022            Today's clinic visit entailed:  Review of the result(s) of each unique test - lipid panel, exercise stress echocardiogram  30 minutes spent on the date of the encounter doing chart review, history and exam, documentation and further activities per the note  Provider  Link to [facility] Help Grid     The level of medical decision making during this visit was of moderate complexity.

## 2022-05-23 ENCOUNTER — E-VISIT (OUTPATIENT)
Dept: FAMILY MEDICINE | Facility: CLINIC | Age: 72
End: 2022-05-23
Payer: COMMERCIAL

## 2022-05-23 DIAGNOSIS — B96.89 ACUTE BACTERIAL SINUSITIS: Primary | ICD-10-CM

## 2022-05-23 DIAGNOSIS — J01.90 ACUTE BACTERIAL SINUSITIS: Primary | ICD-10-CM

## 2022-05-23 PROCEDURE — 99421 OL DIG E/M SVC 5-10 MIN: CPT | Performed by: INTERNAL MEDICINE

## 2022-05-23 NOTE — PATIENT INSTRUCTIONS
Dear Madonna Duque    After reviewing your responses, I've been able to diagnose you with?a sinus infection caused by bacteria.?     Based on your responses and diagnosis, I have prescribed augmentin to treat your symptoms. I have sent this to your pharmacy.?     It is also important to stay well hydrated, get lots of rest and take over-the-counter decongestants,?tylenol?or ibuprofen if you?are able to?take those medications per your primary care provider to help relieve discomfort.?     It is important that you take?all of?your prescribed medication even if your symptoms are improving after a few doses.? Taking?all of?your medicine helps prevent the symptoms from returning.?     If your symptoms worsen, you develop severe headache, vomiting, high fever (>102), or are not improving in 7 days, please contact your primary care provider for an appointment or visit any of our convenient Walk-in Care or Urgent Care Centers to be seen which can be found on our website?here.?     Thanks again for choosing?us?as your health care partner,?   ?  Jesse Tejada MD?

## 2022-06-21 ENCOUNTER — VIRTUAL VISIT (OUTPATIENT)
Dept: FAMILY MEDICINE | Facility: CLINIC | Age: 72
End: 2022-06-21
Payer: COMMERCIAL

## 2022-06-21 ENCOUNTER — E-VISIT (OUTPATIENT)
Dept: FAMILY MEDICINE | Facility: CLINIC | Age: 72
End: 2022-06-21
Payer: COMMERCIAL

## 2022-06-21 ENCOUNTER — TELEPHONE (OUTPATIENT)
Dept: FAMILY MEDICINE | Facility: CLINIC | Age: 72
End: 2022-06-21

## 2022-06-21 DIAGNOSIS — U07.1 INFECTION DUE TO 2019 NOVEL CORONAVIRUS: Primary | ICD-10-CM

## 2022-06-21 DIAGNOSIS — Z20.822 SUSPECTED COVID-19 VIRUS INFECTION: Primary | ICD-10-CM

## 2022-06-21 DIAGNOSIS — U07.1 CLINICAL DIAGNOSIS OF COVID-19: ICD-10-CM

## 2022-06-21 DIAGNOSIS — R50.9 FEVER, UNSPECIFIED FEVER CAUSE: ICD-10-CM

## 2022-06-21 PROCEDURE — 99421 OL DIG E/M SVC 5-10 MIN: CPT | Performed by: INTERNAL MEDICINE

## 2022-06-21 PROCEDURE — 99213 OFFICE O/P EST LOW 20 MIN: CPT | Mod: 95 | Performed by: INTERNAL MEDICINE

## 2022-06-21 NOTE — PATIENT INSTRUCTIONS
Dear Madonna,      Based on your responses, you may have coronavirus (COVID-19).     Will I be tested for COVID-19?  We would like to test you for COVID. I have placed orders for this test.     To schedule: go to your [employer] home page and scroll down to the section that says  You have an appointment that needs to be scheduled  and click the large green button that says  Schedule Now  and follow the steps to find the next available openings.    If you are unable to complete these [employer] scheduling steps, please call [phone] to schedule your testing.     These guidelines are for isolating before returning to work, school or .     For employers, schools and day cares: This is an official notice for this person s medical guidelines for returning in-person.     For health care sites: The CDC gives different isolation and quarantine guidelines for healthcare sites, please check with these sites before arriving.     How do I self-isolate?  You isolate when you have symptoms of COVID or a test shows you have COVID, even if you don t have symptoms.     If you DO have symptoms:  o Stay home and away from others  - For at least 5 days after your symptoms started, AND   - You are fever free for 24 hours (with no medicine that reduces fever), AND  - Your other symptoms are better.  o Wear a mask for 10 full days any time you are around others.    If you DON T have symptoms:  o Stay at home and away from others for at least 5 days after your positive test.  o Wear a mask for 10 full days any time you are around others.    How can I take care of myself?  Over the counter medications may help with your symptoms such as runny or stuffy nose, cough, chills, or fever.  Talk to your care team about your options.     Some people are at high risk of severe illness (for example, you have a weak immune system, you re 65 years or older, or you have certain medical problems). If your risk is high and your symptoms started in  the last 5 to 7 days, we strongly recommend for you to get COVID treatment as soon as possible. Paxlovid, Molnupiravir and the monoclonal antibody treatments are proven safe and effective, make you feel better faster, and prevent hospitalization and death.       To schedule an appointment to discuss COVID treatment, request an appointment on MyChart (select  COVID-19 Treatment ) or call [phone] ([phone]), press 7.      Get lots of rest. Drink extra fluids (unless a doctor has told you not to)    Take Tylenol (acetaminophen) or ibuprofen for fever or pain. If you have liver or kidney problems, ask your family doctor if it's okay to take Tylenol or ibuprofen    Take over the counter medications for your symptoms, as directed by your doctor. You may also talk to your pharmacist.      If you have other health problems (like cancer, heart failure, an organ transplant or severe kidney disease): Call your specialty clinic if you don't feel better in the next 2 days.    Know when to call 911. Emergency warning signs include:  o Trouble breathing or shortness of breath  o Pain or pressure in the chest that doesn't go away  o Feeling confused like you haven't felt before, or not being able to wake up  o Bluish-colored lips or face    Where can I get more information?    [facility] - About COVID-19: [email or web address]     CDC - What to Do If You're Sick: https://www.cdc.gov/coronavirus/2019-ncov/if-you-are-sick/index.html     CDC - Quarantine & Isolation: https://www.cdc.gov/coronavirus/2019-ncov/your-health/quarantine-isolation.html     [facility] clinical trials (COVID-19 research studies): [email or web address]    Below are the COVID-19 hotlines at the [facility] Interpreters are available.  o For health questions: Call 812-645-3951 or [phone] (7 a.m. to 7 p.m.)  o For questions about schools and childcare: Call  [phone] or [phone] (7 a.m. to 7 p.m.)

## 2022-06-21 NOTE — PROGRESS NOTES
Madonna is a 71 year old who is being evaluated via a billable video visit.      How would you like to obtain your AVS? Mail a copy  If the video visit is dropped, the invitation should be resent by: Text to cell phone: [phone]  Will anyone else be joining your video visit? No            Subjective   Madonna is a 71 year old, presenting for the following health issues:  Covid Concern (Home test positive 6/21)      HPI       COVID-19 Symptom Review  How many days ago did these symptoms start? 1    Are any of the following symptoms significant for you?    New or worsening difficulty breathing? No    Worsening cough? Yes, dry cough.    Fever or chills? Yes, the highest temperature was 100.4 F    Headache: YES    Sore throat: no    Chest pain: no    Diarrhea: no    Body aches? YES    What treatments has patient tried? Acetaminophen   Does patient live in a nursing home, group home, or shelter? no  Does patient have a way to get food/medications during quarantined? Yes, I have a friend or family member who can help me.        cold, body ache, ha, dry cough, not hungry, things don't taste very good.  Had temp 100.4 earlier today.  Not having chest pain or shortness of breath or gi c/'o.  Has age and cv dz as risk factors.                Vitals:  No vitals were obtained today due to virtual visit.    Physical Exam   GENERAL: Healthy, alert and no distress  EYES: Eyes grossly normal to inspection.  No discharge or erythema, or obvious scleral/conjunctival abnormalities.  RESP: No audible wheeze, cough, or visible cyanosis.  No visible retractions or increased work of breathing.    SKIN: Visible skin clear. No significant rash, abnormal pigmentation or lesions.  NEURO: Cranial nerves grossly intact.  Mentation and speech appropriate for age.  PSYCH: Mentation appears normal, affect normal/bright, judgement and insight intact, normal speech and appearance well-groomed.    ASSESSMENT:  covid 19 with age and cv dz as risk  factors.  I discussed with the patient treatment including Paxil and she would like to start this as soon as possible which I think is very reasonable.  We discussed potential side effects and potential rebound and she understands this.  She understands isolate for at least 5 days until her fever is going away and her symptoms are resolving and then work in a 95 for 5 days after.  She will  the medication today.  If she does develop chest pain or shortness of breath should go to the emergency room    PLAN:  above            Video-Visit Details    Video Start Time: 3:15    Type of service:  Video Visit    Video End Time:3:34 PM    Originating Location (pt. Location): Home    Distant Location (provider location):  [facility]     Platform used for Video Visit: Nela    .  ..

## 2022-06-21 NOTE — TELEPHONE ENCOUNTER
Patient scheduled for e-visit today for covid-19 testing/symptoms. Provider huddled with writer asking for patient to schedule VV covid treatment today at 315.     Writer called patient and scheduled   6/21/2022 3:15 PM (Arrive by 3:05 PM) Jesse Tejada MD [facility]       Jake Peraza RN  [facility]

## 2022-06-21 NOTE — PATIENT INSTRUCTIONS

## 2022-10-23 ENCOUNTER — HEALTH MAINTENANCE LETTER (OUTPATIENT)
Age: 72
End: 2022-10-23

## 2022-11-17 ENCOUNTER — E-VISIT (OUTPATIENT)
Dept: FAMILY MEDICINE | Facility: CLINIC | Age: 72
End: 2022-11-17
Payer: COMMERCIAL

## 2022-11-17 DIAGNOSIS — R05.1 ACUTE COUGH: Primary | ICD-10-CM

## 2022-11-17 PROCEDURE — 99421 OL DIG E/M SVC 5-10 MIN: CPT | Performed by: INTERNAL MEDICINE

## 2022-11-17 RX ORDER — OSELTAMIVIR PHOSPHATE 75 MG/1
75 CAPSULE ORAL 2 TIMES DAILY
Qty: 10 CAPSULE | Refills: 0 | Status: SHIPPED | OUTPATIENT
Start: 2022-11-17 | End: 2022-11-22

## 2022-12-29 ENCOUNTER — TRANSFERRED RECORDS (OUTPATIENT)
Dept: HEALTH INFORMATION MANAGEMENT | Facility: CLINIC | Age: 72
End: 2022-12-29
Payer: COMMERCIAL

## 2023-01-02 ENCOUNTER — OFFICE VISIT (OUTPATIENT)
Dept: CARDIOLOGY | Facility: CLINIC | Age: 73
End: 2023-01-02
Attending: INTERNAL MEDICINE
Payer: COMMERCIAL

## 2023-01-02 ENCOUNTER — LAB (OUTPATIENT)
Dept: LAB | Facility: CLINIC | Age: 73
End: 2023-01-02
Payer: COMMERCIAL

## 2023-01-02 VITALS
OXYGEN SATURATION: 99 % | BODY MASS INDEX: 26.51 KG/M2 | SYSTOLIC BLOOD PRESSURE: 127 MMHG | WEIGHT: 155.3 LBS | HEART RATE: 64 BPM | HEIGHT: 64 IN | DIASTOLIC BLOOD PRESSURE: 79 MMHG

## 2023-01-02 DIAGNOSIS — I25.10 CORONARY ARTERY DISEASE INVOLVING NATIVE CORONARY ARTERY OF NATIVE HEART WITHOUT ANGINA PECTORIS: ICD-10-CM

## 2023-01-02 LAB
ANION GAP SERPL CALCULATED.3IONS-SCNC: 6 MMOL/L (ref 3–14)
BUN SERPL-MCNC: 18 MG/DL (ref 7–30)
CALCIUM SERPL-MCNC: 9.9 MG/DL (ref 8.5–10.1)
CHLORIDE BLD-SCNC: 106 MMOL/L (ref 94–109)
CHOLEST SERPL-MCNC: 195 MG/DL
CO2 SERPL-SCNC: 29 MMOL/L (ref 20–32)
CREAT SERPL-MCNC: 0.62 MG/DL (ref 0.52–1.04)
FASTING STATUS PATIENT QL REPORTED: YES
GFR SERPL CREATININE-BSD FRML MDRD: >90 ML/MIN/1.73M2
GLUCOSE BLD-MCNC: 125 MG/DL (ref 70–99)
HDLC SERPL-MCNC: 66 MG/DL
LDLC SERPL CALC-MCNC: 99 MG/DL
NONHDLC SERPL-MCNC: 129 MG/DL
POTASSIUM BLD-SCNC: 4.1 MMOL/L (ref 3.4–5.3)
SODIUM SERPL-SCNC: 141 MMOL/L (ref 133–144)
TRIGL SERPL-MCNC: 148 MG/DL

## 2023-01-02 PROCEDURE — 36415 COLL VENOUS BLD VENIPUNCTURE: CPT | Performed by: INTERNAL MEDICINE

## 2023-01-02 PROCEDURE — 80048 BASIC METABOLIC PNL TOTAL CA: CPT | Performed by: INTERNAL MEDICINE

## 2023-01-02 PROCEDURE — 80061 LIPID PANEL: CPT | Performed by: INTERNAL MEDICINE

## 2023-01-02 PROCEDURE — 99214 OFFICE O/P EST MOD 30 MIN: CPT | Performed by: INTERNAL MEDICINE

## 2023-01-02 NOTE — PROGRESS NOTES
CARDIOLOGY CLINIC VISIT  DATE OF SERVICE:  January 2, 2023      PRIMARY CARE PHYSICIAN:  Jesse Tejada    HISTORY OF PRESENT ILLNESS:  Ms. Madonna Duque  is 72 years old and is followed for history of coronary disease, prior coronary intervention and prior myocardial infarct who presents to clinic today for follow up.  She was last seen by me in January 2022 and prior to that was a patient of Dr. Rodriguez.         In 2015, she was in [place].  She experienced an acute anterior ST elevation myocardial infarct complicated by ventricular fibrillatory arrest.  She was evacuated by air and eventually underwent coronary angiography which resulted in placement of 2 stents in the LAD.  She has done well since that time without recurrent symptoms.      She has dyslipidemia but developed severe myalgias with 40 mg of rosuvastatin.  She then was in a cholesterol therapy study at [facility] run by Dr. Surjit Perez that was discontinued, then was on therapy with Praluent.  She developed leg aching and weakness with Praluent and it necessitated the discontinuation of the medication though her LDL had fallen to 81 mg/dl. She has been significantly intolerant of statins due to severe myalgias with Zetia, rosuvastatin, atorvastatin and simvastatin.  With her last visit, rosuvastatin 5 mg daily was started with concurrent use of coenzyme Q 10 and her LDL has returned at 91 mg/dL (HDL 58 mg/dL liter) as measured last week.     In follow up today, Madonna states that over the past year she has noted worsening fatigue with activities.  She was in [place] with her  in September and she states she would need to stop to catch her breath more frequently than she has had to in the past.  She is not able to walk as much due to issues with back pain. She is able to swim three days a week, but notes that her endurance has decreased since last year.      She continues on crestor 10 mg daily which she has been tolerating.        PAST MEDICAL  HISTORY:  Past Medical History:   Diagnosis Date     Abdominal discomfort 12/2013    ct abd and pelvis no cause found     Cancer (H) 2017    Basal cell left forarm     Chest pain 02/2014    neg est echo     Colonic polyp 2005    fu 2011 nl and to fu 2021     Coronary artery disease involving native coronary artery of native heart without angina pectoris 2015    NSTEMI/Vfib arrest/stents x2     Dizzy 2009    mri brain nl     DJD (degenerative joint disease)     neck     Elevated blood sugar      GERD (gastroesophageal reflux disease) 2007    egd, benign stricture     Hypercholesteremia 2010    crestor intolerant, lipitor intolerant     CORINA (iron deficiency anaemia) 2008    ugi and sbft nl, be nl     Left ventricular diastolic dysfunction 01/2016    echo done for fu and nl ef, no wma     Osteopenia     fu dexa 2011 -1.9 fem neck and -1.2 spine, stable c/w 2007     Osteopenia of both hips 10/2019    screening dexa     PONV (postoperative nausea and vomiting)      STEMI (ST elevation myocardial infarction) (H) 12/2015    in [place] on cruise, lytics then ptca of lad, 2 stents, had vfib arrest, ef on angio 67%, 40% rca, no other dz; fu est echo 12/16 nl     Syncope     episode August 2017, possible vasovagal     Tubular adenoma of colon 04/2021    fu 5 years     Urethral strictures      Ventricular fibrillation (H) 12/2015    during mi     Vitamin D deficiency        MEDICATIONS:  Current Outpatient Medications   Medication     aspirin 81 MG EC tablet     carvedilol (COREG) 3.125 MG tablet     Cholecalciferol (VITAMIN D3 PO)     coenzyme Q-10 (CO-Q10) 50 MG capsule     Cranberry 1000 MG CAPS     nirmatrelvir and ritonavir (PAXLOVID) therapy pack     nitroGLYcerin (NITROSTAT) 0.4 MG sublingual tablet     omeprazole (PRILOSEC) 20 MG DR capsule     rosuvastatin (CRESTOR) 10 MG tablet     No current facility-administered medications for this visit.       ALLERGIES:  Allergies   Allergen Reactions     Crestor [Rosuvastatin]       Muscle aches, debilitating     Codeine Nausea and Vomiting     Shrimp Nausea and Vomiting         REVIEW OF SYSTEMS:  A complete ROS was obtained and the pertinent positives are outlined in the history of present illness above.  The remainder of systems is negative.      PHYSICAL EXAM:                     Vital Signs with Ranges     0 lbs 0 oz    Constitutional: awake, alert, no distress  Eyes: PERRL, sclera nonicteric  ENT: trachea midline  Respiratory: CTAB  Cardiovascular: RRR no m/r/g, no JVD  GI: nondistended, nontender, bowel sounds present  Lymph/Hematologic: no lymphadenopathy  Skin: dry, no rash  Musculoskeletal: good muscle tone, no edema bilaterally  Neurologic: no focal deficits  Neuropsychiatric: appropriate affact    DATA:  Labs:    HDL 59    ASSESSMENT:  1.  Coronary artery disease:  S/P anterior STEMI complicated by VF arrest; 2 KISHA to LAD in 2015.  Stress echocardiogram in 2021 was normal.  She has new symptoms of exertional fatigue and decreased endurance.    2.  Hyperlipidemia:  .  Difficulty with statins in the past and did not tolerate praluent, but currently tolerating rosuvastatin 10 mg daily with CoQ10.  She has participated in a number of studies through Dr. Perez' lipid clinic.  She has been on zetia in the past, which did not change her LDL and it was ultimatel stopped.        RECOMMENDATIONS:  -Recommend exercise stress echocardiogram for further risk stratification.  She will hold her BB the night before and morning of her test.  -fasting blood work day  -Follow up in one year or before than as needed    Delmy Mccurdy MD [facility]  January 2, 2023

## 2023-01-02 NOTE — LETTER
1/2/2023    Jesse Tejada MD  [address]  [facility] [address]    RE: Madonna Duque       Dear Colleague,     I had the pleasure of seeing Madonna Duque in the [facility].  CARDIOLOGY CLINIC VISIT  DATE OF SERVICE:  January 2, 2023      PRIMARY CARE PHYSICIAN:  Jesse Tejada    HISTORY OF PRESENT ILLNESS:  Ms. Madonna Duque  is 72 years old and is followed for history of coronary disease, prior coronary intervention and prior myocardial infarct who presents to clinic today for follow up.  She was last seen by me in January 2022 and prior to that was a patient of Dr. Rodriguez.         In 2015, she was in [place].  She experienced an acute anterior ST elevation myocardial infarct complicated by ventricular fibrillatory arrest.  She was evacuated by air and eventually underwent coronary angiography which resulted in placement of 2 stents in the LAD.  She has done well since that time without recurrent symptoms.      She has dyslipidemia but developed severe myalgias with 40 mg of rosuvastatin.  She then was in a cholesterol therapy study at [facility] run by Dr. Surjit Perez that was discontinued, then was on therapy with Praluent.  She developed leg aching and weakness with Praluent and it necessitated the discontinuation of the medication though her LDL had fallen to 81 mg/dl. She has been significantly intolerant of statins due to severe myalgias with Zetia, rosuvastatin, atorvastatin and simvastatin.  With her last visit, rosuvastatin 5 mg daily was started with concurrent use of coenzyme Q 10 and her LDL has returned at 91 mg/dL (HDL 58 mg/dL liter) as measured last week.     In follow up today, Madonna states that over the past year she has noted worsening fatigue with activities.  She was in [place] with her  in September and she states she would need to stop to catch her breath more frequently than she has had to in the past.  She is not able to walk as much due to  issues with back pain. She is able to swim three days a week, but notes that her endurance has decreased since last year.      She continues on crestor 10 mg daily which she has been tolerating.        PAST MEDICAL HISTORY:  Past Medical History:   Diagnosis Date     Abdominal discomfort 12/2013    ct abd and pelvis no cause found     Cancer (H) 2017    Basal cell left forarm     Chest pain 02/2014    neg est echo     Colonic polyp 2005    fu 2011 nl and to fu 2021     Coronary artery disease involving native coronary artery of native heart without angina pectoris 2015    NSTEMI/Vfib arrest/stents x2     Dizzy 2009    mri brain nl     DJD (degenerative joint disease)     neck     Elevated blood sugar      GERD (gastroesophageal reflux disease) 2007    egd, benign stricture     Hypercholesteremia 2010    crestor intolerant, lipitor intolerant     CORINA (iron deficiency anaemia) 2008    ugi and sbft nl, be nl     Left ventricular diastolic dysfunction 01/2016    echo done for fu and nl ef, no wma     Osteopenia     fu dexa 2011 -1.9 fem neck and -1.2 spine, stable c/w 2007     Osteopenia of both hips 10/2019    screening dexa     PONV (postoperative nausea and vomiting)      STEMI (ST elevation myocardial infarction) (H) 12/2015    in [place] on cruise, lytics then ptca of lad, 2 stents, had vfib arrest, ef on angio 67%, 40% rca, no other dz; fu est echo 12/16 nl     Syncope     episode August 2017, possible vasovagal     Tubular adenoma of colon 04/2021    fu 5 years     Urethral strictures      Ventricular fibrillation (H) 12/2015    during mi     Vitamin D deficiency        MEDICATIONS:  Current Outpatient Medications   Medication     aspirin 81 MG EC tablet     carvedilol (COREG) 3.125 MG tablet     Cholecalciferol (VITAMIN D3 PO)     coenzyme Q-10 (CO-Q10) 50 MG capsule     Cranberry 1000 MG CAPS     nirmatrelvir and ritonavir (PAXLOVID) therapy pack     nitroGLYcerin (NITROSTAT) 0.4 MG sublingual tablet      omeprazole (PRILOSEC) 20 MG DR capsule     rosuvastatin (CRESTOR) 10 MG tablet     No current facility-administered medications for this visit.       ALLERGIES:  Allergies   Allergen Reactions     Crestor [Rosuvastatin]      Muscle aches, debilitating     Codeine Nausea and Vomiting     Shrimp Nausea and Vomiting         REVIEW OF SYSTEMS:  A complete ROS was obtained and the pertinent positives are outlined in the history of present illness above.  The remainder of systems is negative.      PHYSICAL EXAM:                     Vital Signs with Ranges     0 lbs 0 oz    Constitutional: awake, alert, no distress  Eyes: PERRL, sclera nonicteric  ENT: trachea midline  Respiratory: CTAB  Cardiovascular: RRR no m/r/g, no JVD  GI: nondistended, nontender, bowel sounds present  Lymph/Hematologic: no lymphadenopathy  Skin: dry, no rash  Musculoskeletal: good muscle tone, no edema bilaterally  Neurologic: no focal deficits  Neuropsychiatric: appropriate affact    DATA:  Labs:    HDL 59    ASSESSMENT:  1.  Coronary artery disease:  S/P anterior STEMI complicated by VF arrest; 2 KISHA to LAD in 2015.  Stress echocardiogram in 2021 was normal.  She has new symptoms of exertional fatigue and decreased endurance.    2.  Hyperlipidemia:  .  Difficulty with statins in the past and did not tolerate praluent, but currently tolerating rosuvastatin 10 mg daily with CoQ10.  She has participated in a number of studies through Dr. Perez' lipid clinic.  She has been on zetia in the past, which did not change her LDL and it was ultimatel stopped.        RECOMMENDATIONS:  -Recommend exercise stress echocardiogram for further risk stratification.  She will hold her BB the night before and morning of her test.  -fasting blood work day  -Follow up in one year or before than as needed    Delmy Mccurdy MD [facility]  January 2, 2023      Thank you for allowing me to participate in the care of your  patient.      Sincerely,     Delmy Mccurdy MD     [facility]  cc:   Delmy Mccurdy MD  [facility] [place]  [address]  [place] [address]

## 2023-01-02 NOTE — PATIENT INSTRUCTIONS
-Schedule exercise stress echocardiogram  -Fasting blood work today  -Follow up in one year with Dr. Mccurdy

## 2023-01-03 ENCOUNTER — TRANSFERRED RECORDS (OUTPATIENT)
Dept: HEALTH INFORMATION MANAGEMENT | Facility: CLINIC | Age: 73
End: 2023-01-03
Payer: COMMERCIAL

## 2023-01-09 ENCOUNTER — HOSPITAL ENCOUNTER (OUTPATIENT)
Dept: CARDIOLOGY | Facility: CLINIC | Age: 73
Discharge: HOME | End: 2023-01-09
Attending: INTERNAL MEDICINE | Admitting: INTERNAL MEDICINE
Payer: COMMERCIAL

## 2023-01-09 DIAGNOSIS — I25.10 CORONARY ARTERY DISEASE INVOLVING NATIVE CORONARY ARTERY OF NATIVE HEART WITHOUT ANGINA PECTORIS: ICD-10-CM

## 2023-01-09 PROCEDURE — 93350 STRESS TTE ONLY: CPT | Mod: 26 | Performed by: INTERNAL MEDICINE

## 2023-01-09 PROCEDURE — 93321 DOPPLER ECHO F-UP/LMTD STD: CPT | Mod: TC

## 2023-01-09 PROCEDURE — 93325 DOPPLER ECHO COLOR FLOW MAPG: CPT | Mod: 26 | Performed by: INTERNAL MEDICINE

## 2023-01-09 PROCEDURE — 93325 DOPPLER ECHO COLOR FLOW MAPG: CPT | Mod: TC

## 2023-01-09 PROCEDURE — 93016 CV STRESS TEST SUPVJ ONLY: CPT | Performed by: INTERNAL MEDICINE

## 2023-01-09 PROCEDURE — 255N000002 HC RX 255 OP 636: Performed by: INTERNAL MEDICINE

## 2023-01-09 PROCEDURE — 93321 DOPPLER ECHO F-UP/LMTD STD: CPT | Mod: 26 | Performed by: INTERNAL MEDICINE

## 2023-01-09 PROCEDURE — 93018 CV STRESS TEST I&R ONLY: CPT | Performed by: INTERNAL MEDICINE

## 2023-01-09 RX ADMIN — HUMAN ALBUMIN MICROSPHERES AND PERFLUTREN 9 ML: 10; .22 INJECTION, SOLUTION INTRAVENOUS at 11:28

## 2023-02-04 ASSESSMENT — ENCOUNTER SYMPTOMS
CHILLS: 0
JOINT SWELLING: 0
DIARRHEA: 0
EYE PAIN: 0
HEMATURIA: 0
NAUSEA: 0
ARTHRALGIAS: 1
FREQUENCY: 0
COUGH: 0
BREAST MASS: 0
NERVOUS/ANXIOUS: 0
HEMATOCHEZIA: 0
PARESTHESIAS: 0
SHORTNESS OF BREATH: 0
DIZZINESS: 0
HEARTBURN: 0
MYALGIAS: 0
SORE THROAT: 0
FEVER: 0
ABDOMINAL PAIN: 0
PALPITATIONS: 0
HEADACHES: 0
DYSURIA: 0
WEAKNESS: 0
CONSTIPATION: 0

## 2023-02-04 ASSESSMENT — ACTIVITIES OF DAILY LIVING (ADL): CURRENT_FUNCTION: NO ASSISTANCE NEEDED

## 2023-02-10 ENCOUNTER — OFFICE VISIT (OUTPATIENT)
Dept: FAMILY MEDICINE | Facility: CLINIC | Age: 73
End: 2023-02-10
Payer: COMMERCIAL

## 2023-02-10 VITALS
TEMPERATURE: 97.7 F | HEART RATE: 64 BPM | RESPIRATION RATE: 16 BRPM | SYSTOLIC BLOOD PRESSURE: 132 MMHG | BODY MASS INDEX: 26.63 KG/M2 | HEIGHT: 64 IN | WEIGHT: 156 LBS | DIASTOLIC BLOOD PRESSURE: 85 MMHG | OXYGEN SATURATION: 98 %

## 2023-02-10 DIAGNOSIS — E78.2 MIXED HYPERLIPIDEMIA: ICD-10-CM

## 2023-02-10 DIAGNOSIS — I25.10 CORONARY ARTERY DISEASE INVOLVING NATIVE CORONARY ARTERY OF NATIVE HEART WITHOUT ANGINA PECTORIS: ICD-10-CM

## 2023-02-10 DIAGNOSIS — I49.01 VENTRICULAR FIBRILLATION (H): ICD-10-CM

## 2023-02-10 DIAGNOSIS — K21.9 GASTROESOPHAGEAL REFLUX DISEASE WITHOUT ESOPHAGITIS: ICD-10-CM

## 2023-02-10 DIAGNOSIS — I25.10 CORONARY ARTERY DISEASE INVOLVING NATIVE HEART WITHOUT ANGINA PECTORIS, UNSPECIFIED VESSEL OR LESION TYPE: ICD-10-CM

## 2023-02-10 DIAGNOSIS — R73.9 ELEVATED BLOOD SUGAR: ICD-10-CM

## 2023-02-10 DIAGNOSIS — Z00.00 ENCOUNTER FOR MEDICARE ANNUAL WELLNESS EXAM: Primary | ICD-10-CM

## 2023-02-10 DIAGNOSIS — E78.00 HYPERCHOLESTEREMIA: ICD-10-CM

## 2023-02-10 DIAGNOSIS — D12.6 TUBULAR ADENOMA OF COLON: ICD-10-CM

## 2023-02-10 DIAGNOSIS — E55.9 VITAMIN D DEFICIENCY: ICD-10-CM

## 2023-02-10 DIAGNOSIS — Z23 NEED FOR VACCINATION: ICD-10-CM

## 2023-02-10 LAB
ERYTHROCYTE [DISTWIDTH] IN BLOOD BY AUTOMATED COUNT: 13.5 % (ref 10–15)
HBA1C MFR BLD: 6.3 % (ref 0–5.6)
HCT VFR BLD AUTO: 41.8 % (ref 35–47)
HGB BLD-MCNC: 13.9 G/DL (ref 11.7–15.7)
MCH RBC QN AUTO: 29.3 PG (ref 26.5–33)
MCHC RBC AUTO-ENTMCNC: 33.3 G/DL (ref 31.5–36.5)
MCV RBC AUTO: 88 FL (ref 78–100)
PLATELET # BLD AUTO: 235 10E3/UL (ref 150–450)
RBC # BLD AUTO: 4.74 10E6/UL (ref 3.8–5.2)
WBC # BLD AUTO: 6.3 10E3/UL (ref 4–11)

## 2023-02-10 PROCEDURE — 83036 HEMOGLOBIN GLYCOSYLATED A1C: CPT | Performed by: INTERNAL MEDICINE

## 2023-02-10 PROCEDURE — 85027 COMPLETE CBC AUTOMATED: CPT | Performed by: INTERNAL MEDICINE

## 2023-02-10 PROCEDURE — 36415 COLL VENOUS BLD VENIPUNCTURE: CPT | Performed by: INTERNAL MEDICINE

## 2023-02-10 PROCEDURE — 90471 IMMUNIZATION ADMIN: CPT | Performed by: INTERNAL MEDICINE

## 2023-02-10 PROCEDURE — G0439 PPPS, SUBSEQ VISIT: HCPCS | Performed by: INTERNAL MEDICINE

## 2023-02-10 PROCEDURE — 90714 TD VACC NO PRESV 7 YRS+ IM: CPT | Performed by: INTERNAL MEDICINE

## 2023-02-10 RX ORDER — CARVEDILOL 3.12 MG/1
3.12 TABLET ORAL 2 TIMES DAILY WITH MEALS
Qty: 180 TABLET | Refills: 3 | Status: SHIPPED | OUTPATIENT
Start: 2023-02-10 | End: 2024-03-25

## 2023-02-10 RX ORDER — ROSUVASTATIN CALCIUM 10 MG/1
10 TABLET, COATED ORAL DAILY
Qty: 90 TABLET | Refills: 3 | Status: SHIPPED | OUTPATIENT
Start: 2023-02-10 | End: 2024-03-05

## 2023-02-10 ASSESSMENT — ACTIVITIES OF DAILY LIVING (ADL): CURRENT_FUNCTION: NO ASSISTANCE NEEDED

## 2023-02-10 ASSESSMENT — PAIN SCALES - GENERAL: PAINLEVEL: NO PAIN (0)

## 2023-02-10 NOTE — RESULT ENCOUNTER NOTE
It was nice to see you today.  As you can see your CBC is normal.  Your hemoglobin A1c remains slightly elevated at 6.3.  The best way to lower this is by regular exercise, a healthy diet, and trying to get your weight down a bit which I would strongly encourage.  We should check it again next year.    Please let me know if you have questions.    Jesse Tejada M.D.

## 2023-02-10 NOTE — PATIENT INSTRUCTIONS
Patient Education   Personalized Prevention Plan  You are due for the preventive services outlined below.  Your care team is available to assist you in scheduling these services.  If you have already completed any of these items, please share that information with your care team to update in your medical record.  Health Maintenance Due   Topic Date Due     ANNUAL REVIEW OF HM ORDERS  Never done     Diptheria Tetanus Pertussis (DTAP/TDAP/TD) Vaccine (3 - Td or Tdap) 12/17/2022       Urinary Incontinence, Female (Adult)   Urinary incontinence means loss of bladder control. This problem affects many women, especially as they get older. If you have incontinence, you may be embarrassed to ask for help. But know that this problem can be treated.   Types of Incontinence  There are different types of incontinence. Two of the main types are described here. You can have more than one type.     Stress incontinence. With this type, urine leaks when pressure (stress) is put on the bladder. This may happen when you cough, sneeze, or laugh. Stress incontinence most often occurs because the pelvic floor muscles that support the bladder and urethra are weak. This can happen after pregnancy and vaginal childbirth or a hysterectomy. It can also be due to excess body weight or hormone changes.    Urge incontinence (also called overactive bladder). With this type, a sudden urge to urinate is felt often. This may happen even though there may not be much urine in the bladder. The need to urinate often during the night is common. Urge incontinence most often occurs because of bladder spasms. This may be due to bladder irritation or infection. Damage to bladder nerves or pelvic muscles, constipation, and certain medicines can also lead to urge incontinence.  Treatment depends on the cause. Further evaluation is needed to find the type you have. This will likely include an exam and certain tests. Based on the results, you and your  healthcare provider can then plan treatment. Until a diagnosis is made, the home care tips below can help ease symptoms.   Home care    Do pelvic floor muscle exercises, if they are prescribed. The pelvic floor muscles help support the bladder and urethra. Many women find that their symptoms improve when doing special exercises that strengthen these muscles. To do the exercises, contract the muscles you would use to stop your stream of urine. But do this when you re not urinating. Hold for 10 seconds, then relax. Repeat 10 to 20 times in a row, at least 3 times a day. Your healthcare provider may give you other instructions for how to do the exercises and how often.    Keep a bladder diary. This helps track how often and how much you urinate over a set period of time. Bring this diary with you to your next visit with the provider. The information can help your provider learn more about your bladder problem.    Lose weight, if advised to by your provider. Extra weight puts pressure on the bladder. Your provider can help you create a weight-loss plan that s right for you. This may include exercising more and making certain diet changes.    Don't have foods and drinks that may irritate the bladder. These can include alcohol and caffeinated drinks.    Quit smoking. Smoking and other tobacco use can lead to a long-term (chronic) cough that strains the pelvic floor muscles. Smoking may also damage the bladder and urethra. Talk with your provider about treatments or methods you can use to quit smoking.    If drinking large amounts of fluid makes you have symptoms, you may be advised to limit your fluid intake. You may also be advised to drink most of your fluids during the day and to limit fluids at night.    If you re worried about urine leakage or accidents, you may wear absorbent pads to catch urine. Change the pads often. This helps reduce discomfort. It may also reduce the risk of skin or bladder  infections.    Follow-up care  Follow up with your healthcare provider, or as directed. It may take some to find the right treatment for your problem. But healthy lifestyle changes can be made right away. These include such things as exercising on a regular basis, eating a healthy diet, losing weight (if needed), and quitting smoking. Your treatment plan may include special therapies or medicines. Certain procedures or surgery may also be options. Talk about any questions you have with your provider.   When to seek medical advice  Call the healthcare provider right away if any of these occur:    Fever of 100.4 F (38 C) or higher, or as directed by your provider    Bladder pain or fullness    Belly swelling    Nausea or vomiting    Back pain    Weakness, dizziness, or fainting  [employer] last reviewed this educational content on 1/1/2020 2000-2021 [employer]. All rights reserved. This information is not intended as a substitute for professional medical care. Always follow your healthcare professional's instructions.

## 2023-02-10 NOTE — NURSING NOTE
Prior to immunization administration, verified patients identity using patient s name and date of birth. Please see Immunization Activity for additional information.     Screening Questionnaire for Adult Immunization    Are you sick today?   No   Do you have allergies to medications, food, a vaccine component or latex?   yes   Have you ever had a serious reaction after receiving a vaccination?   No   Do you have a long-term health problem with heart, lung, kidney, or metabolic disease (e.g., diabetes), asthma, a blood disorder, no spleen, complement component deficiency, a cochlear implant, or a spinal fluid leak?  Are you on long-term aspirin therapy?   No   Do you have cancer, leukemia, HIV/AIDS, or any other immune system problem?   No   Do you have a parent, brother, or sister with an immune system problem?   No   In the past 3 months, have you taken medications that affect  your immune system, such as prednisone, other steroids, or anticancer drugs; drugs for the treatment of rheumatoid arthritis, Crohn s disease, or psoriasis; or have you had radiation treatments?   No   Have you had a seizure, or a brain or other nervous system problem?   No   During the past year, have you received a transfusion of blood or blood    products, or been given immune (gamma) globulin or antiviral drug?   No   For women: Are you pregnant or is there a chance you could become       pregnant during the next month?   No   Have you received any vaccinations in the past 4 weeks?   No     Immunization questionnaire was positive for at least one answer.  Notified Dr. Tejada.        Per orders of Dr. Tejada, injection of Td given by Liseth Norton MA. Patient instructed to remain in clinic for 15 minutes afterwards, and to report any adverse reaction to me immediately.     Patient verified  Screening performed by Liseth Norton MA on 2/10/2023 at 10:48 AM.

## 2023-02-10 NOTE — PROGRESS NOTES
SUBJECTIVE:   Madonna is a 72 year old who presents for Preventive Visit.    She is really doing quite well.  She exercises regularly.  She has some hip pain and is seeing Ortho for that but otherwise has no complaints.               Past Medical History:      Past Medical History:   Diagnosis Date     Abdominal discomfort 12/2013    ct abd and pelvis no cause found     Cancer (H) 2017    Basal cell left forarm     Chest pain 02/2014    neg est echo     Colonic polyp 2005    fu 2011 nl and to fu 2021     Coronary artery disease involving native coronary artery of native heart without angina pectoris 2015    NSTEMI/Vfib arrest/stents x2     Dizzy 2009    mri brain nl     DJD (degenerative joint disease)     neck     Elevated blood sugar      GERD (gastroesophageal reflux disease) 2007    egd, benign stricture     Hypercholesteremia 2010    crestor intolerant, lipitor intolerant     CORINA (iron deficiency anaemia) 2008    ugi and sbft nl, be nl     Left ventricular diastolic dysfunction 01/2016    echo done for fu and nl ef, no wma     Osteopenia     fu dexa 2011 -1.9 fem neck and -1.2 spine, stable c/w 2007     Osteopenia of both hips 10/2019    screening dexa     PONV (postoperative nausea and vomiting)      STEMI (ST elevation myocardial infarction) (H) 12/2015    in [place] on cruise, lytics then ptca of lad, 2 stents, had vfib arrest, ef on angio 67%, 40% rca, no other dz; fu est echo 12/16 nl     Syncope     episode August 2017, possible vasovagal     Tubular adenoma of colon 04/2021    fu 5 years     Urethral strictures      Ventricular fibrillation (H) 12/2015    during mi     Vitamin D deficiency              Past Surgical History:      Past Surgical History:   Procedure Laterality Date     APPENDECTOMY      appendectomy done at same time as oopherectomy     CARDIAC SURGERY  2015    2 stents LAD     COLONOSCOPY  04/28/2021    2 pre cancerous polyps     COSMETIC SURGERY  05/05/2021    Moes procedure, squamous cell  nose     HC REMOVAL OF OVARY/TUBE(S)  81    Salpingo-Oophorectomy, Unilateral(endometriosis)     LAPAROTOMY EXPLORATORY  79    due to endomet, removed ovary     OTHER SURGICAL HISTORY  2015    stents x2 to LAD     REPAIR PTOSIS BILATERAL  2012    Procedure:REPAIR PTOSIS BILATERAL; BILATERAL UPPER LID MECHANICAL PTOSIS REPAIR,and ptosis repair; Surgeon:JOVANA BETANCUR; Location:[facility] LIGATE FALLOPIAN TUBE,POSTPARTUM  85    Tubal Ligation             Social History:     Social History     Socioeconomic History     Marital status:      Spouse name: Not on file     Number of children: 1     Years of education: Not on file     Highest education level: Not on file   Occupational History     Occupation: rn, retired      Employer: [employer]   Tobacco Use     Smoking status: Former     Packs/day: 0.50     Years: 10.00     Pack years: 5.00     Types: Cigarettes     Start date: 10/1/1973     Quit date: 1982     Years since quittin.0     Smokeless tobacco: Never     Tobacco comments:     quit 23 years ago   Substance and Sexual Activity     Alcohol use: No     Alcohol/week: 0.0 standard drinks     Drug use: No     Sexual activity: Yes     Partners: Male     Birth control/protection: Post-menopausal   Other Topics Concern     Parent/sibling w/ CABG, MI or angioplasty before 65F 55M? No      Service Not Asked     Blood Transfusions Not Asked     Caffeine Concern Not Asked     Occupational Exposure Not Asked     Hobby Hazards Not Asked     Sleep Concern Not Asked     Stress Concern Not Asked     Weight Concern Not Asked     Special Diet No     Comment: heart healthy, weight watches      Back Care Not Asked     Exercise No     Bike Helmet Not Asked     Seat Belt Not Asked     Self-Exams Not Asked   Social History Narrative     Not on file     Social Determinants of Health     Financial Resource Strain: Not on file   Food Insecurity: Not on file  "  Transportation Needs: Not on file   Physical Activity: Not on file   Stress: Not on file   Social Connections: Not on file   Intimate Partner Violence: Not on file   Housing Stability: Not on file             Family History:   reviewed         Allergies:     Allergies   Allergen Reactions     Crestor [Rosuvastatin]      Muscle aches, debilitating     Codeine Nausea and Vomiting     Shrimp Nausea and Vomiting             Medications:     Current Outpatient Medications   Medication Sig Dispense Refill     aspirin 81 MG EC tablet Take 1 tablet (81 mg) by mouth daily 90 tablet 3     carvedilol (COREG) 3.125 MG tablet Take 1 tablet (3.125 mg) by mouth 2 times daily (with meals) 180 tablet 3     Cholecalciferol (VITAMIN D3 PO) Take 5,000 Units by mouth daily       coenzyme Q-10 (CO-Q10) 50 MG capsule Take 2 capsules (100 mg) by mouth daily 100 capsule      Cranberry 1000 MG CAPS Take 42,000 mg by mouth 2 times daily       nitroGLYcerin (NITROSTAT) 0.4 MG sublingual tablet Place 1 tablet (0.4 mg) under the tongue every 5 minutes as needed for chest pain If you are still having symptoms after 3 doses (15 minutes) call 911. 25 tablet 0     omeprazole (PRILOSEC) 20 MG DR capsule Take 20 mg by mouth 2 times daily        rosuvastatin (CRESTOR) 10 MG tablet Take 1 tablet (10 mg) by mouth daily 90 tablet 3               Review of Systems:   The 10 point Review of Systems is negative other than noted in the HPI           Physical Exam:   Blood pressure 132/85, pulse 64, temperature 97.7  F (36.5  C), temperature source Temporal, resp. rate 16, height 1.626 m (5' 4.02\"), weight 70.8 kg (156 lb), SpO2 98 %, not currently breastfeeding.    Exam:  Constitutional: healthy appearing, alert and in no distress  Heent: Normocephalic. Head without obvious masses or lesions. PERRLDC, EOMI. Mouth exam within normal limits: tongue, mucous membranes, posterior pharynx all normal, no lesions or abnormalities seen.  Tm's and canals within " "normal limits bilaterally. Neck supple, no nuchal rigidity or masses. No supraclavicular, or cervical adenopathy. Thyroid symmetric, no masses.  Cardiovascular: Regular rate and rhythm, no murmer, rub or gallops.  JVP not elevated, no edema.  Carotids within normal limits bilaterally, no bruits.  Respiratory: Normal respiratory effort.  Lungs clear, normal flow, no wheezing or crackles.  Breasts: Normal bilaterally.  No masses or lesions.  Nipples within normal limits.  No axillary lesions or nodes.  My M.A. Was present during this part of the examination.  Gastrointestinal: Normal active bowel sounds.   Soft, not tender, no masses, guarding or rebound.  No hepatosplenomegaly.   Musculoskeletal: extremities normal, no gross deformities noted.  Skin: no suspicious lesions or rashes   Neurologic: Mental status within normal limits.  Speech fluent.  No gross motor abnormalities and gait intact.  Psychiatric: mentation appears normal and affect normal.         Data:   Labs noted, others sent        Assessment:   1. Normal complete physical exam  2. Cad, vfib, no issues, med mgmt  3. Elevated cholesterol, on statin  4. Elevated sugar, follow up a1c  5. Gerd, controlled  6. Vit d defic, follow up last year fine  7. Colon polyp, up to date on follow up  8. hcm         Plan:   Td shot  Letter with labs  Mammogram  Exercise, diet      Jesse Tejada M.D.              Are you in the first 12 months of your Medicare coverage?  Yes,  Visual Acuity:  Right Eye:    Left Eye:   Both Eyes:     Healthy Habits:     In general, how would you rate your overall health?  Good    Frequency of exercise:  2-3 days/week    Duration of exercise:  30-45 minutes    Do you usually eat at least 4 servings of fruit and vegetables a day, include whole grains    & fiber and avoid regularly eating high fat or \"junk\" foods?  Yes    Taking medications regularly:  Yes    Medication side effects:  None    Ability to successfully perform activities of " daily living:  No assistance needed    Home Safety:  No safety concerns identified    Hearing Impairment:  No hearing concerns    In the past 6 months, have you been bothered by leaking of urine? Yes    In general, how would you rate your overall mental or emotional health?  Excellent      PHQ-2 Total Score: 0    Additional concerns today:  No      Have you ever done Advance Care Planning? (For example, a Health Directive, POLST, or a discussion with a medical provider or your loved ones about your wishes): No, advance care planning information given to patient to review.  Patient plans to discuss their wishes with loved ones or provider.         Fall risk  Fallen 2 or more times in the past year?: No  Any fall with injury in the past year?: No    Cognitive Screening   1) Repeat 3 items (Leader, Season, Table)    2) Clock draw: NORMAL  3) 3 item recall: Recalls 3 objects  Results: 3 items recalled: COGNITIVE IMPAIRMENT LESS LIKELY    Mini-CogTM Copyright SINCERE Dubois. Licensed by the author for use in [facility]; reprinted with permission (queta@[facility]). All rights reserved.      Do you have sleep apnea, excessive snoring or daytime drowsiness?: no    Reviewed and updated as needed this visit by clinical staff                  Reviewed and updated as needed this visit by Provider                 Social History     Tobacco Use     Smoking status: Former     Packs/day: 0.50     Years: 10.00     Pack years: 5.00     Types: Cigarettes     Start date: 10/1/1973     Quit date: 1982     Years since quittin.0     Smokeless tobacco: Never     Tobacco comments:     quit 23 years ago   Substance Use Topics     Alcohol use: No     Alcohol/week: 0.0 standard drinks         Alcohol Use 2023   Prescreen: >3 drinks/day or >7 drinks/week? Not Applicable   Prescreen: >3 drinks/day or >7 drinks/week? -   No flowsheet data found.            Current providers sharing in care for this patient include:   Patient  "Care Team:  Jesse Tejada MD as PCP - General  Jesse Tejada MD as Assigned PCP  Delmy Mccurdy MD as Assigned Heart and Vascular Provider    The following health maintenance items are reviewed in Epic and correct as of today:  Health Maintenance   Topic Date Due     ANNUAL REVIEW OF HM ORDERS  Never done     DTAP/TDAP/TD IMMUNIZATION (3 - Td or Tdap) 12/17/2022     MEDICARE ANNUAL WELLNESS VISIT  01/03/2023     MAMMO SCREENING  12/14/2023     FALL RISK ASSESSMENT  02/10/2024     ADVANCE CARE PLANNING  01/03/2027     LIPID  01/02/2028     COLORECTAL CANCER SCREENING  04/28/2031     DEXA  10/31/2034     HEPATITIS C SCREENING  Completed     PHQ-2 (once per calendar year)  Completed     INFLUENZA VACCINE  Completed     Pneumococcal Vaccine: 65+ Years  Completed     ZOSTER IMMUNIZATION  Completed     COVID-19 Vaccine  Completed     IPV IMMUNIZATION  Aged Out     MENINGITIS IMMUNIZATION  Aged Out               Review of Systems      OBJECTIVE:   There were no vitals taken for this visit. Estimated body mass index is 26.64 kg/m  as calculated from the following:    Height as of 1/2/23: 1.626 m (5' 4.02\").    Weight as of 1/2/23: 70.4 kg (155 lb 4.8 oz).  Physical Exam          ASSESSMENT / PLAN:             COUNSELING:  Reviewed preventive health counseling, as reflected in patient instructions       Regular exercise       Healthy diet/nutrition      BMI:   Estimated body mass index is 26.64 kg/m  as calculated from the following:    Height as of 1/2/23: 1.626 m (5' 4.02\").    Weight as of 1/2/23: 70.4 kg (155 lb 4.8 oz).         She reports that she quit smoking about 41 years ago. Her smoking use included cigarettes. She started smoking about 49 years ago. She has a 5.00 pack-year smoking history. She has never used smokeless tobacco.      Appropriate preventive services were discussed with this patient, including applicable screening as appropriate for cardiovascular disease, diabetes, " osteopenia/osteoporosis, and glaucoma.  As appropriate for age/gender, discussed screening for colorectal cancer, prostate cancer, breast cancer, and cervical cancer. Checklist reviewing preventive services available has been given to the patient.    Reviewed patients plan of care and provided an AVS. The Basic Care Plan (routine screening as documented in Health Maintenance) for Madonna meets the Care Plan requirement. This Care Plan has been established and reviewed with the Patient.          Jesse Tejada MD  [facility]    Identified Health Risks:

## 2023-03-22 ENCOUNTER — MEDICAL CORRESPONDENCE (OUTPATIENT)
Dept: HEALTH INFORMATION MANAGEMENT | Facility: CLINIC | Age: 73
End: 2023-03-22

## 2023-03-22 ENCOUNTER — TRANSFERRED RECORDS (OUTPATIENT)
Dept: HEALTH INFORMATION MANAGEMENT | Facility: CLINIC | Age: 73
End: 2023-03-22
Payer: COMMERCIAL

## 2023-05-08 ENCOUNTER — OFFICE VISIT (OUTPATIENT)
Dept: FAMILY MEDICINE | Facility: CLINIC | Age: 73
End: 2023-05-08
Payer: COMMERCIAL

## 2023-05-08 VITALS
BODY MASS INDEX: 27.86 KG/M2 | RESPIRATION RATE: 10 BRPM | HEART RATE: 61 BPM | SYSTOLIC BLOOD PRESSURE: 124 MMHG | TEMPERATURE: 96.9 F | WEIGHT: 163.2 LBS | DIASTOLIC BLOOD PRESSURE: 84 MMHG | OXYGEN SATURATION: 100 % | HEIGHT: 64 IN

## 2023-05-08 DIAGNOSIS — Z01.818 PREOP GENERAL PHYSICAL EXAM: Primary | ICD-10-CM

## 2023-05-08 DIAGNOSIS — M16.11 PRIMARY OSTEOARTHRITIS OF RIGHT HIP: ICD-10-CM

## 2023-05-08 DIAGNOSIS — I25.10 CORONARY ARTERY DISEASE INVOLVING NATIVE CORONARY ARTERY OF NATIVE HEART WITHOUT ANGINA PECTORIS: ICD-10-CM

## 2023-05-08 LAB
ANION GAP SERPL CALCULATED.3IONS-SCNC: 12 MMOL/L (ref 7–15)
BUN SERPL-MCNC: 13.2 MG/DL (ref 8–23)
CALCIUM SERPL-MCNC: 9.9 MG/DL (ref 8.8–10.2)
CHLORIDE SERPL-SCNC: 104 MMOL/L (ref 98–107)
CREAT SERPL-MCNC: 0.72 MG/DL (ref 0.51–0.95)
DEPRECATED HCO3 PLAS-SCNC: 25 MMOL/L (ref 22–29)
ERYTHROCYTE [DISTWIDTH] IN BLOOD BY AUTOMATED COUNT: 13.6 % (ref 10–15)
GFR SERPL CREATININE-BSD FRML MDRD: 88 ML/MIN/1.73M2
GLUCOSE SERPL-MCNC: 122 MG/DL (ref 70–99)
HCT VFR BLD AUTO: 41.8 % (ref 35–47)
HGB BLD-MCNC: 13.7 G/DL (ref 11.7–15.7)
HOLD SPECIMEN: NORMAL
MCH RBC QN AUTO: 29.7 PG (ref 26.5–33)
MCHC RBC AUTO-ENTMCNC: 32.8 G/DL (ref 31.5–36.5)
MCV RBC AUTO: 91 FL (ref 78–100)
PLATELET # BLD AUTO: 249 10E3/UL (ref 150–450)
POTASSIUM SERPL-SCNC: 4.3 MMOL/L (ref 3.4–5.3)
RBC # BLD AUTO: 4.61 10E6/UL (ref 3.8–5.2)
SODIUM SERPL-SCNC: 141 MMOL/L (ref 136–145)
WBC # BLD AUTO: 5.6 10E3/UL (ref 4–11)

## 2023-05-08 PROCEDURE — 36415 COLL VENOUS BLD VENIPUNCTURE: CPT | Performed by: NURSE PRACTITIONER

## 2023-05-08 PROCEDURE — 85027 COMPLETE CBC AUTOMATED: CPT | Performed by: NURSE PRACTITIONER

## 2023-05-08 PROCEDURE — 80048 BASIC METABOLIC PNL TOTAL CA: CPT | Performed by: NURSE PRACTITIONER

## 2023-05-08 PROCEDURE — 93000 ELECTROCARDIOGRAM COMPLETE: CPT | Performed by: NURSE PRACTITIONER

## 2023-05-08 PROCEDURE — 99214 OFFICE O/P EST MOD 30 MIN: CPT | Performed by: NURSE PRACTITIONER

## 2023-05-08 ASSESSMENT — PAIN SCALES - GENERAL: PAINLEVEL: MODERATE PAIN (4)

## 2023-05-08 NOTE — H&P (VIEW-ONLY)
[address], [address]  [facility] [address]  Phone: [phone]  Primary Provider: Jesse Tejada  Pre-op Performing Provider: MIKEY UMANA      PREOPERATIVE EVALUATION:  Today's date: 5/8/2023    Madonna Duque is a 72 year old female who presents for a preoperative evaluation.    Surgical Information:  Surgery/Procedure: Right Total Hip Arthroplasty  Surgery Location: [facility]  Surgeon: Dr Thomas  Surgery Date: 5/25/23  Time of Surgery: 12:45pm  Where patient plans to recover: At home with family  Fax number for surgical facility: Note does not need to be faxed, will be available electronically in Epic.    Assessment & Plan     The proposed surgical procedure is considered INTERMEDIATE risk.    (Z01.818) Preop general physical exam  (primary encounter diagnosis)  Comment: ok for surgery. No concerns today   Plan: EKG 12-lead complete w/read - Clinics, Basic         Metabolic Panel, CBC w/ Reflex to Iron Studies            (M16.11) Primary osteoarthritis of right hip  Comment:   Plan:     (I25.10) Coronary artery disease involving native coronary artery of native heart without angina pectoris  Comment: Vfib arrest with stents x2 in 2015. No residual damage. No symptoms. Has been stable. Stress echo 1/9/23 was normal but not optimal test.    Plan:             - No identified additional risk factors other than previously addressed    Antiplatelet or Anticoagulation Medication Instructions:   - aspirin: Discontinue aspirin 7-10 days prior to procedure to reduce bleeding risk. It should be resumed postoperatively.     Additional Medication Instructions:  Hold all supplements morning of surgery       RECOMMENDATION:  APPROVAL GIVEN to proceed with proposed procedure, without further diagnostic evaluation.        Subjective     HPI related to upcoming procedure: going for R OSCAR   Has been feeling well. Never has CP, SOB.           5/1/2023      3:37 PM   Preop Questions   1. Have you ever had a heart attack or stroke? YES - STEMI, Vfib arrest 2015    2. Have you ever had surgery on your heart or blood vessels, such as a stent placement, a coronary artery bypass, or surgery on an artery in your head, neck, heart, or legs? YES - stents x2   3. Do you have chest pain with activity? No   4. Do you have a history of  heart failure? No   5. Do you currently have a cold, bronchitis or symptoms of other infection? No   6. Do you have a cough, shortness of breath, or wheezing? No    7. Do you or anyone in your family have previous history of blood clots? YES - MI. No PE, DVT    8. Do you or does anyone in your family have a serious bleeding problem such as prolonged bleeding following surgeries or cuts? No   9. Have you ever had problems with anemia or been told to take iron pills? No   10. Have you had any abnormal blood loss such as black, tarry or bloody stools, or abnormal vaginal bleeding? No   11. Have you ever had a blood transfusion? No   12. Are you willing to have a blood transfusion if it is medically needed before, during, or after your surgery? Yes   13. Have you or any of your relatives ever had problems with anesthesia? No   14. Do you have sleep apnea, excessive snoring or daytime drowsiness? No   15. Do you have any artifical heart valves or other implanted medical devices like a pacemaker, defibrillator, or continuous glucose monitor? No   16. Do you have artificial joints? No   17. Are you allergic to latex? No       Health Care Directive:  Patient does not have a Health Care Directive or Living Will:     Preoperative Review of :   reviewed - no record of controlled substances prescribed.      Status of Chronic Conditions:  See problem list for active medical problems.  Problems all longstanding and stable, except as noted/documented.  See ROS for pertinent symptoms related to these conditions.      Review of Systems  Constitutional, neuro,  ENT, endocrine, pulmonary, cardiac, gastrointestinal, genitourinary, musculoskeletal, integument and psychiatric systems are negative, except as otherwise noted.    Patient Active Problem List    Diagnosis Date Noted     Tubular adenoma of colon 04/2021     Priority: Medium     fu 5 years       Osteopenia of both hips 10/01/2019     Priority: Medium     screening dexa       Coronary artery disease involving native coronary artery of native heart without angina pectoris      Priority: Medium     NSTEMI/Vfib arrest/stents x2 2015       Gastroesophageal reflux disease without esophagitis 07/14/2016     Priority: Medium     Elevated blood sugar      Priority: Medium     Ventricular fibrillation (H)      Priority: Medium     during mi       Advanced directives, counseling/discussion 12/17/2012     Priority: Medium     Discussed advance care planning with patient; however, patient declined at this time. 12/17/2012 Wendy LEDESMA MA           Vitamin D deficiency      Priority: Medium     Urethral stricture      Priority: Medium     (Problem list name updated by automated process. Provider to review and confirm.)       DJD (degenerative joint disease)      Priority: Medium     neck       Hypercholesteremia 01/01/2010     Priority: Medium     crestor intolerant, lipitor intolerant        Past Medical History:   Diagnosis Date     Abdominal discomfort 12/2013    ct abd and pelvis no cause found     Cancer (H) 2017    Basal cell left forarm     Chest pain 02/2014    neg est echo     Colonic polyp 2005    fu 2011 nl and to fu 2021     Coronary artery disease involving native coronary artery of native heart without angina pectoris 2015    NSTEMI/Vfib arrest/stents x2     Dizzy 2009    mri brain nl     DJD (degenerative joint disease)     neck     Elevated blood sugar      GERD (gastroesophageal reflux disease) 2007    egd, benign stricture     Hypercholesteremia 2010    crestor intolerant, lipitor intolerant     CORINA (iron  deficiency anaemia) 2008    ugi and sbft nl, be nl     Left ventricular diastolic dysfunction 01/2016    echo done for fu and nl ef, no wma     Osteopenia     fu dexa 2011 -1.9 fem neck and -1.2 spine, stable c/w 2007     Osteopenia of both hips 10/2019    screening dexa     PONV (postoperative nausea and vomiting)      STEMI (ST elevation myocardial infarction) (H) 12/2015    in [place] on cruise, lytics then ptca of lad, 2 stents, had vfib arrest, ef on angio 67%, 40% rca, no other dz; fu est echo 12/16 nl     Syncope     episode August 2017, possible vasovagal     Tubular adenoma of colon 04/2021    fu 5 years     Urethral strictures      Ventricular fibrillation (H) 12/2015    during mi     Vitamin D deficiency      Past Surgical History:   Procedure Laterality Date     APPENDECTOMY      appendectomy done at same time as oopherectomy     CARDIAC SURGERY  2015    2 stents LAD     COLONOSCOPY  04/28/2021    2 pre cancerous polyps     COSMETIC SURGERY  05/05/2021    Moes procedure, squamous cell nose     HC REMOVAL OF OVARY/TUBE(S)  81    Salpingo-Oophorectomy, Unilateral(endometriosis)     LAPAROTOMY EXPLORATORY  79    due to endomet, removed ovary     OTHER SURGICAL HISTORY  2015    stents x2 to LAD     REPAIR PTOSIS BILATERAL  2/7/2012    Procedure:REPAIR PTOSIS BILATERAL; BILATERAL UPPER LID MECHANICAL PTOSIS REPAIR,and ptosis repair; Surgeon:JOVANA BETANCUR; Location:[facility] LIGATE FALLOPIAN TUBE,POSTPARTUM  85    Tubal Ligation     Current Outpatient Medications   Medication Sig Dispense Refill     aspirin 81 MG EC tablet Take 1 tablet (81 mg) by mouth daily 90 tablet 3     carvedilol (COREG) 3.125 MG tablet Take 1 tablet (3.125 mg) by mouth 2 times daily (with meals) 180 tablet 3     Cholecalciferol (VITAMIN D3 PO) Take 5,000 Units by mouth daily       coenzyme Q-10 (CO-Q10) 50 MG capsule Take 2 capsules (100 mg) by mouth daily 100 capsule      Cranberry 1000 MG CAPS Take 42,000 mg by mouth 2 times  "daily       nitroGLYcerin (NITROSTAT) 0.4 MG sublingual tablet Place 1 tablet (0.4 mg) under the tongue every 5 minutes as needed for chest pain If you are still having symptoms after 3 doses (15 minutes) call 911. 25 tablet 0     omeprazole (PRILOSEC) 20 MG DR capsule Take 20 mg by mouth 2 times daily        rosuvastatin (CRESTOR) 10 MG tablet Take 1 tablet (10 mg) by mouth daily 90 tablet 3       Allergies   Allergen Reactions     Crestor [Rosuvastatin]      Muscle aches, debilitating     Morphine And Related Nausea and Vomiting     Shrimp Nausea and Vomiting        Social History     Tobacco Use     Smoking status: Former     Packs/day: 0.50     Years: 10.00     Pack years: 5.00     Types: Cigarettes     Start date: 10/1/1973     Quit date: 1982     Years since quittin.3     Smokeless tobacco: Never     Tobacco comments:     quit 23 years ago   Vaping Use     Vaping status: Not on file   Substance Use Topics     Alcohol use: No     Alcohol/week: 0.0 standard drinks of alcohol     Family History   Problem Relation Age of Onset     Cancer Mother         CLL     Hypertension Mother      Cerebrovascular Disease Mother      Diabetes Father      Cancer Father         Castlemans     Other Cancer Father         Castelman's disease     Other Cancer Sister         Endometrial cancer     Thyroid Disease Sister      Diabetes Brother      Diabetes Paternal Aunt      Diabetes Paternal Aunt      Diabetes Paternal Aunt      Breast Cancer Paternal Aunt      Cancer - colorectal Paternal Aunt      Cancer - colorectal Paternal Aunt      Diabetes Paternal Uncle      Diabetes Paternal Uncle      Diabetes Paternal Uncle      Cancer Paternal Uncle         bladder     History   Drug Use No         Objective     /84 (BP Location: Left arm, Patient Position: Sitting, Cuff Size: Adult Regular)   Pulse 61   Temp 96.9  F (36.1  C) (Tympanic)   Resp 10   Ht 1.626 m (5' 4\")   Wt 74 kg (163 lb 3.2 oz)   SpO2 100%   BMI " 28.01 kg/m      Physical Exam    GENERAL APPEARANCE: healthy, alert and no distress     EYES: EOMI,      RESP: lungs clear to auscultation - no rales, rhonchi or wheezes     CV: regular rates and rhythm, normal S1 S2, no S3 or S4 and no murmur, click or rub     MS: extremities normal- no gross deformities noted, no evidence of inflammation in joints, FROM in all extremities.     SKIN: no suspicious lesions or rashes     NEURO: Normal strength and tone, sensory exam grossly normal, mentation intact and speech normal     PSYCH: mentation appears normal. and affect normal/bright    Recent Labs   Lab Test 02/10/23  1049 01/02/23  0921 01/03/22  1018   HGB 13.9  --  13.5     --  270   NA  --  141 138   POTASSIUM  --  4.1 4.5   CR  --  0.62 0.80   A1C 6.3*  --  6.1*        Diagnostics:  Recent Results (from the past 24 hour(s))   CBC with Platelets and Reflex to Iron Studies    Collection Time: 05/08/23  9:26 AM   Result Value Ref Range    WBC Count 5.6 4.0 - 11.0 10e3/uL    RBC Count 4.61 3.80 - 5.20 10e6/uL    Hemoglobin 13.7 11.7 - 15.7 g/dL    Hematocrit 41.8 35.0 - 47.0 %    MCV 91 78 - 100 fL    MCH 29.7 26.5 - 33.0 pg    MCHC 32.8 31.5 - 36.5 g/dL    RDW 13.6 10.0 - 15.0 %    Platelet Count 249 150 - 450 10e3/uL      EKG: sinus bradycardia, normal axis, normal intervals, no acute ST/T changes c/w ischemia, no LVH by voltage criteria    Revised Cardiac Risk Index (RCRI):  The patient has the following serious cardiovascular risks for perioperative complications:   - Coronary Artery Disease (MI, positive stress test, angina, Qs on EKG) = 1 point     RCRI Interpretation: 1 point: Class II (low risk - 0.9% complication rate)           Signed Electronically by: AARON Higgins CNP  Copy of this evaluation report is provided to requesting physician.

## 2023-05-08 NOTE — PROGRESS NOTES
[address], [address]  [facility] [address]  Phone: [phone]  Primary Provider: Jesse Tejada  Pre-op Performing Provider: MIKEY UMANA      PREOPERATIVE EVALUATION:  Today's date: 5/8/2023    Madonna Duque is a 72 year old female who presents for a preoperative evaluation.    Surgical Information:  Surgery/Procedure: Right Total Hip Arthroplasty  Surgery Location: [facility]  Surgeon: Dr Thomas  Surgery Date: 5/25/23  Time of Surgery: 12:45pm  Where patient plans to recover: At home with family  Fax number for surgical facility: Note does not need to be faxed, will be available electronically in Epic.    Assessment & Plan     The proposed surgical procedure is considered INTERMEDIATE risk.    (Z01.818) Preop general physical exam  (primary encounter diagnosis)  Comment: ok for surgery. No concerns today   Plan: EKG 12-lead complete w/read - Clinics, Basic         Metabolic Panel, CBC w/ Reflex to Iron Studies            (M16.11) Primary osteoarthritis of right hip  Comment:   Plan:     (I25.10) Coronary artery disease involving native coronary artery of native heart without angina pectoris  Comment: Vfib arrest with stents x2 in 2015. No residual damage. No symptoms. Has been stable. Stress echo 1/9/23 was normal but not optimal test.    Plan:             - No identified additional risk factors other than previously addressed    Antiplatelet or Anticoagulation Medication Instructions:   - aspirin: Discontinue aspirin 7-10 days prior to procedure to reduce bleeding risk. It should be resumed postoperatively.     Additional Medication Instructions:  Hold all supplements morning of surgery       RECOMMENDATION:  APPROVAL GIVEN to proceed with proposed procedure, without further diagnostic evaluation.        Subjective     HPI related to upcoming procedure: going for R OSCAR   Has been feeling well. Never has CP, SOB.           5/1/2023      3:37 PM   Preop Questions   1. Have you ever had a heart attack or stroke? YES - STEMI, Vfib arrest 2015    2. Have you ever had surgery on your heart or blood vessels, such as a stent placement, a coronary artery bypass, or surgery on an artery in your head, neck, heart, or legs? YES - stents x2   3. Do you have chest pain with activity? No   4. Do you have a history of  heart failure? No   5. Do you currently have a cold, bronchitis or symptoms of other infection? No   6. Do you have a cough, shortness of breath, or wheezing? No    7. Do you or anyone in your family have previous history of blood clots? YES - MI. No PE, DVT    8. Do you or does anyone in your family have a serious bleeding problem such as prolonged bleeding following surgeries or cuts? No   9. Have you ever had problems with anemia or been told to take iron pills? No   10. Have you had any abnormal blood loss such as black, tarry or bloody stools, or abnormal vaginal bleeding? No   11. Have you ever had a blood transfusion? No   12. Are you willing to have a blood transfusion if it is medically needed before, during, or after your surgery? Yes   13. Have you or any of your relatives ever had problems with anesthesia? No   14. Do you have sleep apnea, excessive snoring or daytime drowsiness? No   15. Do you have any artifical heart valves or other implanted medical devices like a pacemaker, defibrillator, or continuous glucose monitor? No   16. Do you have artificial joints? No   17. Are you allergic to latex? No       Health Care Directive:  Patient does not have a Health Care Directive or Living Will:     Preoperative Review of :   reviewed - no record of controlled substances prescribed.      Status of Chronic Conditions:  See problem list for active medical problems.  Problems all longstanding and stable, except as noted/documented.  See ROS for pertinent symptoms related to these conditions.      Review of Systems  Constitutional, neuro,  ENT, endocrine, pulmonary, cardiac, gastrointestinal, genitourinary, musculoskeletal, integument and psychiatric systems are negative, except as otherwise noted.    Patient Active Problem List    Diagnosis Date Noted     Tubular adenoma of colon 04/2021     Priority: Medium     fu 5 years       Osteopenia of both hips 10/01/2019     Priority: Medium     screening dexa       Coronary artery disease involving native coronary artery of native heart without angina pectoris      Priority: Medium     NSTEMI/Vfib arrest/stents x2 2015       Gastroesophageal reflux disease without esophagitis 07/14/2016     Priority: Medium     Elevated blood sugar      Priority: Medium     Ventricular fibrillation (H)      Priority: Medium     during mi       Advanced directives, counseling/discussion 12/17/2012     Priority: Medium     Discussed advance care planning with patient; however, patient declined at this time. 12/17/2012 Wendy LEDESMA MA           Vitamin D deficiency      Priority: Medium     Urethral stricture      Priority: Medium     (Problem list name updated by automated process. Provider to review and confirm.)       DJD (degenerative joint disease)      Priority: Medium     neck       Hypercholesteremia 01/01/2010     Priority: Medium     crestor intolerant, lipitor intolerant        Past Medical History:   Diagnosis Date     Abdominal discomfort 12/2013    ct abd and pelvis no cause found     Cancer (H) 2017    Basal cell left forarm     Chest pain 02/2014    neg est echo     Colonic polyp 2005    fu 2011 nl and to fu 2021     Coronary artery disease involving native coronary artery of native heart without angina pectoris 2015    NSTEMI/Vfib arrest/stents x2     Dizzy 2009    mri brain nl     DJD (degenerative joint disease)     neck     Elevated blood sugar      GERD (gastroesophageal reflux disease) 2007    egd, benign stricture     Hypercholesteremia 2010    crestor intolerant, lipitor intolerant     CORINA (iron  deficiency anaemia) 2008    ugi and sbft nl, be nl     Left ventricular diastolic dysfunction 01/2016    echo done for fu and nl ef, no wma     Osteopenia     fu dexa 2011 -1.9 fem neck and -1.2 spine, stable c/w 2007     Osteopenia of both hips 10/2019    screening dexa     PONV (postoperative nausea and vomiting)      STEMI (ST elevation myocardial infarction) (H) 12/2015    in [place] on cruise, lytics then ptca of lad, 2 stents, had vfib arrest, ef on angio 67%, 40% rca, no other dz; fu est echo 12/16 nl     Syncope     episode August 2017, possible vasovagal     Tubular adenoma of colon 04/2021    fu 5 years     Urethral strictures      Ventricular fibrillation (H) 12/2015    during mi     Vitamin D deficiency      Past Surgical History:   Procedure Laterality Date     APPENDECTOMY      appendectomy done at same time as oopherectomy     CARDIAC SURGERY  2015    2 stents LAD     COLONOSCOPY  04/28/2021    2 pre cancerous polyps     COSMETIC SURGERY  05/05/2021    Moes procedure, squamous cell nose     HC REMOVAL OF OVARY/TUBE(S)  80    Salpingo-Oophorectomy, Unilateral(endometriosis)     LAPAROTOMY EXPLORATORY  79    due to endomet, removed ovary     OTHER SURGICAL HISTORY  2015    stents x2 to LAD     REPAIR PTOSIS BILATERAL  2/7/2012    Procedure:REPAIR PTOSIS BILATERAL; BILATERAL UPPER LID MECHANICAL PTOSIS REPAIR,and ptosis repair; Surgeon:JOVANA BETANCUR; Location: LIGATE FALLOPIAN TUBE,POSTPARTUM  85    Tubal Ligation     Current Outpatient Medications   Medication Sig Dispense Refill     aspirin 81 MG EC tablet Take 1 tablet (81 mg) by mouth daily 90 tablet 3     carvedilol (COREG) 3.125 MG tablet Take 1 tablet (3.125 mg) by mouth 2 times daily (with meals) 180 tablet 3     Cholecalciferol (VITAMIN D3 PO) Take 5,000 Units by mouth daily       coenzyme Q-10 (CO-Q10) 50 MG capsule Take 2 capsules (100 mg) by mouth daily 100 capsule      Cranberry 1000 MG CAPS Take 42,000 mg by mouth 2 times  "daily       nitroGLYcerin (NITROSTAT) 0.4 MG sublingual tablet Place 1 tablet (0.4 mg) under the tongue every 5 minutes as needed for chest pain If you are still having symptoms after 3 doses (15 minutes) call 911. 25 tablet 0     omeprazole (PRILOSEC) 20 MG DR capsule Take 20 mg by mouth 2 times daily        rosuvastatin (CRESTOR) 10 MG tablet Take 1 tablet (10 mg) by mouth daily 90 tablet 3       Allergies   Allergen Reactions     Crestor [Rosuvastatin]      Muscle aches, debilitating     Morphine And Related Nausea and Vomiting     Shrimp Nausea and Vomiting        Social History     Tobacco Use     Smoking status: Former     Packs/day: 0.50     Years: 10.00     Pack years: 5.00     Types: Cigarettes     Start date: 10/1/1973     Quit date: 1982     Years since quittin.3     Smokeless tobacco: Never     Tobacco comments:     quit 23 years ago   Vaping Use     Vaping status: Not on file   Substance Use Topics     Alcohol use: No     Alcohol/week: 0.0 standard drinks of alcohol     Family History   Problem Relation Age of Onset     Cancer Mother         CLL     Hypertension Mother      Cerebrovascular Disease Mother      Diabetes Father      Cancer Father         Castlemans     Other Cancer Father         Castelman's disease     Other Cancer Sister         Endometrial cancer     Thyroid Disease Sister      Diabetes Brother      Diabetes Paternal Aunt      Diabetes Paternal Aunt      Diabetes Paternal Aunt      Breast Cancer Paternal Aunt      Cancer - colorectal Paternal Aunt      Cancer - colorectal Paternal Aunt      Diabetes Paternal Uncle      Diabetes Paternal Uncle      Diabetes Paternal Uncle      Cancer Paternal Uncle         bladder     History   Drug Use No         Objective     /84 (BP Location: Left arm, Patient Position: Sitting, Cuff Size: Adult Regular)   Pulse 61   Temp 96.9  F (36.1  C) (Tympanic)   Resp 10   Ht 1.626 m (5' 4\")   Wt 74 kg (163 lb 3.2 oz)   SpO2 100%   BMI " 28.01 kg/m      Physical Exam    GENERAL APPEARANCE: healthy, alert and no distress     EYES: EOMI,      RESP: lungs clear to auscultation - no rales, rhonchi or wheezes     CV: regular rates and rhythm, normal S1 S2, no S3 or S4 and no murmur, click or rub     MS: extremities normal- no gross deformities noted, no evidence of inflammation in joints, FROM in all extremities.     SKIN: no suspicious lesions or rashes     NEURO: Normal strength and tone, sensory exam grossly normal, mentation intact and speech normal     PSYCH: mentation appears normal. and affect normal/bright    Recent Labs   Lab Test 02/10/23  1049 01/02/23  0921 01/03/22  1018   HGB 13.9  --  13.5     --  270   NA  --  141 138   POTASSIUM  --  4.1 4.5   CR  --  0.62 0.80   A1C 6.3*  --  6.1*        Diagnostics:  Recent Results (from the past 24 hour(s))   CBC with Platelets and Reflex to Iron Studies    Collection Time: 05/08/23  9:26 AM   Result Value Ref Range    WBC Count 5.6 4.0 - 11.0 10e3/uL    RBC Count 4.61 3.80 - 5.20 10e6/uL    Hemoglobin 13.7 11.7 - 15.7 g/dL    Hematocrit 41.8 35.0 - 47.0 %    MCV 91 78 - 100 fL    MCH 29.7 26.5 - 33.0 pg    MCHC 32.8 31.5 - 36.5 g/dL    RDW 13.6 10.0 - 15.0 %    Platelet Count 249 150 - 450 10e3/uL      EKG: sinus bradycardia, normal axis, normal intervals, no acute ST/T changes c/w ischemia, no LVH by voltage criteria    Revised Cardiac Risk Index (RCRI):  The patient has the following serious cardiovascular risks for perioperative complications:   - Coronary Artery Disease (MI, positive stress test, angina, Qs on EKG) = 1 point     RCRI Interpretation: 1 point: Class II (low risk - 0.9% complication rate)           Signed Electronically by: AARON Higgins CNP  Copy of this evaluation report is provided to requesting physician.

## 2023-05-08 NOTE — PATIENT INSTRUCTIONS
For informational purposes only. Not to replace the advice of your health care provider. Copyright   2003,  [employer] [facility]. All rights reserved. Clinically reviewed by Essie Watson MD. [employer] [id number] - REV .  Preparing for Your Surgery  Getting started  A nurse will call you to review your health history and instructions. They will give you an arrival time based on your scheduled surgery time. Please be ready to share:    Your doctor's clinic name and phone number    Your medical, surgical, and anesthesia history    A list of allergies and sensitivities    A list of medicines, including herbal treatments and over-the-counter drugs    Whether the patient has a legal guardian (ask how to send us the papers in advance)  Please tell us if you're pregnant--or if there's any chance you might be pregnant. Some surgeries may injure a fetus (unborn baby), so they require a pregnancy test. Surgeries that are safe for a fetus don't always need a test, and you can choose whether to have one.   If you have a child who's having surgery, please ask for a copy of Preparing for Your Child's Surgery.    Preparing for surgery    Within 10 to 30 days of surgery: Have a pre-op exam (sometimes called an H&P, or History and Physical). This can be done at a clinic or pre-operative center.  ? If you're having a , you may not need this exam. Talk to your care team.    At your pre-op exam, talk to your care team about all medicines you take. If you need to stop any medicines before surgery, ask when to start taking them again.  ? We do this for your safety. Many medicines can make you bleed too much during surgery. Some change how well surgery (anesthesia) drugs work.    Call your insurance company to let them know you're having surgery. (If you don't have insurance, call [phone].)    Call your clinic if there's any change in your health. This includes signs of a cold or flu (sore throat, runny nose,  cough, rash, fever). It also includes a scrape or scratch near the surgery site.    If you have questions on the day of surgery, call your hospital or surgery center.  Eating and drinking guidelines  For your safety: Unless your surgeon tells you otherwise, follow the guidelines below.    Eat and drink as usual until 8 hours before you arrive for surgery. After that, no food or milk.    Drink clear liquids until 2 hours before you arrive. These are liquids you can see through, like water, Gatorade, and Propel Water. They also include plain black coffee and tea (no cream or milk), candy, and breath mints. You can spit out gum when you arrive.    If you drink alcohol: Stop drinking it the night before surgery.    If your care team tells you to take medicine on the morning of surgery, it's okay to take it with a sip of water.  Preventing infection    Shower or bathe the night before and morning of your surgery. Follow the instructions your clinic gave you. (If no instructions, use regular soap.)    Don't shave or clip hair near your surgery site. We'll remove the hair if needed.    Don't smoke or vape the morning of surgery. You may chew nicotine gum up to 2 hours before surgery. A nicotine patch is okay.  ? Note: Some surgeries require you to completely quit smoking and nicotine. Check with your surgeon.    Your care team will make every effort to keep you safe from infection. We will:  ? Clean our hands often with soap and water (or an alcohol-based hand rub).  ? Clean the skin at your surgery site with a special soap that kills germs.  ? Give you a special gown to keep you warm. (Cold raises the risk of infection.)  ? Wear special hair covers, masks, gowns and gloves during surgery.  ? Give antibiotic medicine, if prescribed. Not all surgeries need antibiotics.  What to bring on the day of surgery    Photo ID and insurance card    Copy of your health care directive, if you have one    Glasses and hearing aids (bring  cases)  ? You can't wear contacts during surgery    Inhaler and eye drops, if you use them (tell us about these when you arrive)    CPAP machine or breathing device, if you use them    A few personal items, if spending the night    If you have . . .  ? A pacemaker, ICD (cardiac defibrillator) or other implant: Bring the ID card.  ? An implanted stimulator: Bring the remote control.  ? A legal guardian: Bring a copy of the certified (court-stamped) guardianship papers.  Please remove any jewelry, including body piercings. Leave jewelry and other valuables at home.  If you're going home the day of surgery    You must have a responsible adult drive you home. They should stay with you overnight as well.    If you don't have someone to stay with you, and you aren't safe to go home alone, we may keep you overnight. Insurance often won't pay for this.  After surgery  If it's hard to control your pain or you need more pain medicine, please call your surgeon's office.  Questions?   If you have any questions for your care team, list them here: _________________________________________________________________________________________________________________________________________________________________________ ____________________________________ ____________________________________ ____________________________________

## 2023-05-23 RX ORDER — SENNOSIDES A AND B 8.6 MG/1
1-2 TABLET, FILM COATED ORAL DAILY PRN
COMMUNITY
End: 2024-01-04

## 2023-05-23 RX ORDER — UBIDECARENONE 100 MG
2 CAPSULE ORAL DAILY
COMMUNITY

## 2023-05-23 RX ORDER — ACETAMINOPHEN 500 MG
1-2 TABLET ORAL EVERY 6 HOURS PRN
Status: ON HOLD | COMMUNITY
End: 2023-05-26

## 2023-05-23 NOTE — PROGRESS NOTES
Pre-op Total Joint Patient Screening    1. Do you have a ride available to come to the hospital the day after your surgery by 8am with anticipated discharge of 11am? Y  2. What is the name of this person? Benjamin (spouse) + Jocelyn (daughter)  3. Do you have a  set up after surgery? Y  4. Will your  be the same person that gives you a ride home after surgery? Y  5. Have you received the Joint Replacement Guidebook? Y  6. Do you have any questions about your guidebook? N  7. Have you signed up for Epic Care Companion? Y  8. Have you signed up for MY Chart access? Y

## 2023-05-23 NOTE — PROGRESS NOTES
PTA medications updated by Medication Scribe prior to surgery via phone call with patient (last doses completed by Nurse)     Medication history sources: Patient, Surescripts, H&P and Patient's home med list  In the past week, patient estimated taking medication this percent of the time: Greater than 90%      Significant changes made to the medication list:  None      Additional medication history information:   None    Medication reconciliation completed by provider prior to medication history? No    Time spent in this activity: 20 minutes    The information provided in this note is only as accurate as the sources available at the time of update(s)    Prior to Admission medications    Medication Sig Last Dose Taking? Auth Provider Long Term End Date   acetaminophen (TYLENOL) 500 MG tablet Take 1-2 tablets by mouth every 6 hours as needed for mild pain 5/23/2023 at am Yes Reported, Patient     aspirin 81 MG EC tablet Take 1 tablet (81 mg) by mouth daily 5/17/2023 at pm Yes Jesse Tejada MD     carvedilol (COREG) 3.125 MG tablet Take 1 tablet (3.125 mg) by mouth 2 times daily (with meals)  at am Yes Jesse Tejada MD Yes    Cholecalciferol (VITAMIN D3) 125 MCG (5000 UT) TBDP Take 1 tablet by mouth daily 5/17/2023 at am Yes Reported, Patient     co-enzyme Q-10 100 MG CAPS capsule Take 2 capsules by mouth daily 5/17/2023 at am Yes Reported, Patient     Cranberry 1000 MG CAPS Take 2 capsules by mouth daily 5/17/2023 at am Yes Reported, Patient     nitroGLYcerin (NITROSTAT) 0.4 MG sublingual tablet Place 1 tablet (0.4 mg) under the tongue every 5 minutes as needed for chest pain If you are still having symptoms after 3 doses (15 minutes) call 911. Unknown at prn Yes Jesse Tejada MD Yes    omeprazole (PRILOSEC) 20 MG DR capsule Take 20 mg by mouth 2 times daily   at am Yes Reported, Patient     rosuvastatin (CRESTOR) 10 MG tablet Take 1 tablet (10 mg) by mouth daily 5/23/2023 at pm Yes Terrell  Jesse Clark MD Yes    senna (SENOKOT) 8.6 MG tablet Take 1-2 tablets by mouth daily as needed for constipation Unknown at prn Yes Reported, Patient       Medication history completed by:    Kayden Jaquez CPhT  Medication [facility]

## 2023-05-24 RX ORDER — OXYCODONE HYDROCHLORIDE 5 MG/1
5 TABLET ORAL EVERY 6 HOURS PRN
Qty: 16 TABLET | Refills: 0 | Status: CANCELLED | OUTPATIENT
Start: 2023-05-24

## 2023-05-25 ENCOUNTER — ANESTHESIA EVENT (OUTPATIENT)
Dept: SURGERY | Facility: CLINIC | Age: 73
End: 2023-05-25
Payer: COMMERCIAL

## 2023-05-25 ENCOUNTER — HOSPITAL ENCOUNTER (OUTPATIENT)
Facility: CLINIC | Age: 73
Discharge: HOME | End: 2023-05-26
Attending: ORTHOPAEDIC SURGERY | Admitting: ORTHOPAEDIC SURGERY
Payer: COMMERCIAL

## 2023-05-25 ENCOUNTER — APPOINTMENT (OUTPATIENT)
Dept: GENERAL RADIOLOGY | Facility: CLINIC | Age: 73
End: 2023-05-25
Attending: SPECIALIST/TECHNOLOGIST
Payer: COMMERCIAL

## 2023-05-25 ENCOUNTER — APPOINTMENT (OUTPATIENT)
Dept: PHYSICAL THERAPY | Facility: CLINIC | Age: 73
End: 2023-05-25
Attending: ORTHOPAEDIC SURGERY
Payer: COMMERCIAL

## 2023-05-25 ENCOUNTER — ANESTHESIA (OUTPATIENT)
Dept: SURGERY | Facility: CLINIC | Age: 73
End: 2023-05-25
Payer: COMMERCIAL

## 2023-05-25 DIAGNOSIS — Z96.641 STATUS POST TOTAL REPLACEMENT OF RIGHT HIP: Primary | ICD-10-CM

## 2023-05-25 PROBLEM — M16.11 OSTEOARTHRITIS OF RIGHT HIP: Status: ACTIVE | Noted: 2023-05-25

## 2023-05-25 LAB
CREAT SERPL-MCNC: 0.63 MG/DL (ref 0.51–0.95)
GFR SERPL CREATININE-BSD FRML MDRD: >90 ML/MIN/1.73M2

## 2023-05-25 PROCEDURE — 250N000013 HC RX MED GY IP 250 OP 250 PS 637: Performed by: PHYSICIAN ASSISTANT

## 2023-05-25 PROCEDURE — 999N000141 HC STATISTIC PRE-PROCEDURE NURSING ASSESSMENT: Performed by: ORTHOPAEDIC SURGERY

## 2023-05-25 PROCEDURE — 370N000017 HC ANESTHESIA TECHNICAL FEE, PER MIN: Performed by: ORTHOPAEDIC SURGERY

## 2023-05-25 PROCEDURE — 97161 PT EVAL LOW COMPLEX 20 MIN: CPT | Mod: GP

## 2023-05-25 PROCEDURE — 258N000001 HC RX 258: Performed by: ORTHOPAEDIC SURGERY

## 2023-05-25 PROCEDURE — 97530 THERAPEUTIC ACTIVITIES: CPT | Mod: GP

## 2023-05-25 PROCEDURE — 250N000011 HC RX IP 250 OP 636: Performed by: ORTHOPAEDIC SURGERY

## 2023-05-25 PROCEDURE — 250N000011 HC RX IP 250 OP 636: Performed by: NURSE ANESTHETIST, CERTIFIED REGISTERED

## 2023-05-25 PROCEDURE — 258N000003 HC RX IP 258 OP 636: Performed by: ANESTHESIOLOGY

## 2023-05-25 PROCEDURE — 36415 COLL VENOUS BLD VENIPUNCTURE: CPT | Performed by: ORTHOPAEDIC SURGERY

## 2023-05-25 PROCEDURE — 258N000003 HC RX IP 258 OP 636: Performed by: NURSE ANESTHETIST, CERTIFIED REGISTERED

## 2023-05-25 PROCEDURE — 82565 ASSAY OF CREATININE: CPT | Performed by: ORTHOPAEDIC SURGERY

## 2023-05-25 PROCEDURE — 258N000003 HC RX IP 258 OP 636: Performed by: SPECIALIST/TECHNOLOGIST

## 2023-05-25 PROCEDURE — C1713 ANCHOR/SCREW BN/BN,TIS/BN: HCPCS | Performed by: ORTHOPAEDIC SURGERY

## 2023-05-25 PROCEDURE — 360N000077 HC SURGERY LEVEL 4, PER MIN: Performed by: ORTHOPAEDIC SURGERY

## 2023-05-25 PROCEDURE — 250N000013 HC RX MED GY IP 250 OP 250 PS 637: Performed by: SPECIALIST/TECHNOLOGIST

## 2023-05-25 PROCEDURE — 272N000001 HC OR GENERAL SUPPLY STERILE: Performed by: ORTHOPAEDIC SURGERY

## 2023-05-25 PROCEDURE — 97116 GAIT TRAINING THERAPY: CPT | Mod: GP

## 2023-05-25 PROCEDURE — C1776 JOINT DEVICE (IMPLANTABLE): HCPCS | Performed by: ORTHOPAEDIC SURGERY

## 2023-05-25 PROCEDURE — 250N000011 HC RX IP 250 OP 636: Performed by: SPECIALIST/TECHNOLOGIST

## 2023-05-25 PROCEDURE — 250N000013 HC RX MED GY IP 250 OP 250 PS 637: Performed by: ORTHOPAEDIC SURGERY

## 2023-05-25 PROCEDURE — 250N000009 HC RX 250: Performed by: ORTHOPAEDIC SURGERY

## 2023-05-25 PROCEDURE — 710N000009 HC RECOVERY PHASE 1, LEVEL 1, PER MIN: Performed by: ORTHOPAEDIC SURGERY

## 2023-05-25 PROCEDURE — 999N000063 XR PELVIS PORT 1/2 VIEWS

## 2023-05-25 PROCEDURE — 250N000009 HC RX 250: Performed by: NURSE ANESTHETIST, CERTIFIED REGISTERED

## 2023-05-25 DEVICE — IMP HEAD FEMORAL STRK BIOLOX DELTA CERAMIC 36MM +2.5MM: Type: IMPLANTABLE DEVICE | Site: HIP | Status: FUNCTIONAL

## 2023-05-25 DEVICE — BONE CEMENT SIMPLEX FULL DOSE 6191-1-001: Type: IMPLANTABLE DEVICE | Site: HIP | Status: FUNCTIONAL

## 2023-05-25 DEVICE — INSERT ACE 36MM 0D F HIP X3 TRDNT 723-00-36F: Type: IMPLANTABLE DEVICE | Site: HIP | Status: FUNCTIONAL

## 2023-05-25 DEVICE — 6.5MM LOW PROFILE HEX SCR 20MM: Type: IMPLANTABLE DEVICE | Site: HIP | Status: FUNCTIONAL

## 2023-05-25 DEVICE — IMPLANTABLE DEVICE: Type: IMPLANTABLE DEVICE | Site: HIP | Status: FUNCTIONAL

## 2023-05-25 DEVICE — 6.5MM LOW PROFILE HEX SCR 35MM: Type: IMPLANTABLE DEVICE | Site: HIP | Status: FUNCTIONAL

## 2023-05-25 RX ORDER — CEFAZOLIN SODIUM/WATER 2 G/20 ML
2 SYRINGE (ML) INTRAVENOUS
Status: COMPLETED | OUTPATIENT
Start: 2023-05-25 | End: 2023-05-25

## 2023-05-25 RX ORDER — HYDROMORPHONE HCL IN WATER/PF 6 MG/30 ML
0.2 PATIENT CONTROLLED ANALGESIA SYRINGE INTRAVENOUS EVERY 5 MIN PRN
Status: DISCONTINUED | OUTPATIENT
Start: 2023-05-25 | End: 2023-05-25 | Stop reason: HOSPADM

## 2023-05-25 RX ORDER — ONDANSETRON 2 MG/ML
4 INJECTION INTRAMUSCULAR; INTRAVENOUS EVERY 6 HOURS PRN
Status: DISCONTINUED | OUTPATIENT
Start: 2023-05-25 | End: 2023-05-26 | Stop reason: HOSPADM

## 2023-05-25 RX ORDER — SODIUM CHLORIDE, SODIUM LACTATE, POTASSIUM CHLORIDE, CALCIUM CHLORIDE 600; 310; 30; 20 MG/100ML; MG/100ML; MG/100ML; MG/100ML
INJECTION, SOLUTION INTRAVENOUS CONTINUOUS
Status: DISCONTINUED | OUTPATIENT
Start: 2023-05-25 | End: 2023-05-25 | Stop reason: HOSPADM

## 2023-05-25 RX ORDER — PROCHLORPERAZINE MALEATE 5 MG
5 TABLET ORAL EVERY 6 HOURS PRN
Status: DISCONTINUED | OUTPATIENT
Start: 2023-05-25 | End: 2023-05-26 | Stop reason: HOSPADM

## 2023-05-25 RX ORDER — ASPIRIN 81 MG/1
81 TABLET ORAL DAILY
Status: DISCONTINUED | OUTPATIENT
Start: 2023-05-26 | End: 2023-05-25 | Stop reason: ALTCHOICE

## 2023-05-25 RX ORDER — OXYCODONE HYDROCHLORIDE 5 MG/1
10 TABLET ORAL EVERY 4 HOURS PRN
Status: DISCONTINUED | OUTPATIENT
Start: 2023-05-25 | End: 2023-05-25 | Stop reason: SINTOL

## 2023-05-25 RX ORDER — AMOXICILLIN 250 MG
1 CAPSULE ORAL 2 TIMES DAILY
Status: DISCONTINUED | OUTPATIENT
Start: 2023-05-25 | End: 2023-05-26 | Stop reason: HOSPADM

## 2023-05-25 RX ORDER — HYDRALAZINE HYDROCHLORIDE 20 MG/ML
2.5-5 INJECTION INTRAMUSCULAR; INTRAVENOUS
Status: DISCONTINUED | OUTPATIENT
Start: 2023-05-25 | End: 2023-05-25 | Stop reason: HOSPADM

## 2023-05-25 RX ORDER — SENNOSIDES A AND B 8.6 MG/1
1-2 TABLET, FILM COATED ORAL DAILY PRN
Status: DISCONTINUED | OUTPATIENT
Start: 2023-05-25 | End: 2023-05-25 | Stop reason: ALTCHOICE

## 2023-05-25 RX ORDER — LIDOCAINE 40 MG/G
CREAM TOPICAL
Status: DISCONTINUED | OUTPATIENT
Start: 2023-05-25 | End: 2023-05-26 | Stop reason: HOSPADM

## 2023-05-25 RX ORDER — CARVEDILOL 3.12 MG/1
3.12 TABLET ORAL 2 TIMES DAILY WITH MEALS
Status: DISCONTINUED | OUTPATIENT
Start: 2023-05-25 | End: 2023-05-26 | Stop reason: HOSPADM

## 2023-05-25 RX ORDER — DEXAMETHASONE SODIUM PHOSPHATE 4 MG/ML
INJECTION, SOLUTION INTRA-ARTICULAR; INTRALESIONAL; INTRAMUSCULAR; INTRAVENOUS; SOFT TISSUE PRN
Status: DISCONTINUED | OUTPATIENT
Start: 2023-05-25 | End: 2023-05-25

## 2023-05-25 RX ORDER — PANTOPRAZOLE SODIUM 40 MG/1
40 TABLET, DELAYED RELEASE ORAL 2 TIMES DAILY
Status: DISCONTINUED | OUTPATIENT
Start: 2023-05-25 | End: 2023-05-26 | Stop reason: HOSPADM

## 2023-05-25 RX ORDER — PROPOFOL 10 MG/ML
INJECTION, EMULSION INTRAVENOUS CONTINUOUS PRN
Status: DISCONTINUED | OUTPATIENT
Start: 2023-05-25 | End: 2023-05-25

## 2023-05-25 RX ORDER — ONDANSETRON 2 MG/ML
INJECTION INTRAMUSCULAR; INTRAVENOUS PRN
Status: DISCONTINUED | OUTPATIENT
Start: 2023-05-25 | End: 2023-05-25

## 2023-05-25 RX ORDER — HYDROXYZINE HYDROCHLORIDE 25 MG/1
25 TABLET, FILM COATED ORAL EVERY 6 HOURS PRN
Status: DISCONTINUED | OUTPATIENT
Start: 2023-05-25 | End: 2023-05-26 | Stop reason: HOSPADM

## 2023-05-25 RX ORDER — FENTANYL CITRATE 0.05 MG/ML
50 INJECTION, SOLUTION INTRAMUSCULAR; INTRAVENOUS EVERY 5 MIN PRN
Status: DISCONTINUED | OUTPATIENT
Start: 2023-05-25 | End: 2023-05-25 | Stop reason: HOSPADM

## 2023-05-25 RX ORDER — NALOXONE HYDROCHLORIDE 0.4 MG/ML
0.2 INJECTION, SOLUTION INTRAMUSCULAR; INTRAVENOUS; SUBCUTANEOUS
Status: DISCONTINUED | OUTPATIENT
Start: 2023-05-25 | End: 2023-05-26 | Stop reason: HOSPADM

## 2023-05-25 RX ORDER — TRANEXAMIC ACID 650 MG/1
1950 TABLET ORAL ONCE
Status: COMPLETED | OUTPATIENT
Start: 2023-05-25 | End: 2023-05-25

## 2023-05-25 RX ORDER — LABETALOL HYDROCHLORIDE 5 MG/ML
5 INJECTION, SOLUTION INTRAVENOUS
Status: DISCONTINUED | OUTPATIENT
Start: 2023-05-25 | End: 2023-05-25 | Stop reason: HOSPADM

## 2023-05-25 RX ORDER — ROSUVASTATIN CALCIUM 10 MG/1
10 TABLET, COATED ORAL AT BEDTIME
Status: DISCONTINUED | OUTPATIENT
Start: 2023-05-25 | End: 2023-05-26 | Stop reason: HOSPADM

## 2023-05-25 RX ORDER — SODIUM CHLORIDE, SODIUM LACTATE, POTASSIUM CHLORIDE, CALCIUM CHLORIDE 600; 310; 30; 20 MG/100ML; MG/100ML; MG/100ML; MG/100ML
INJECTION, SOLUTION INTRAVENOUS CONTINUOUS PRN
Status: DISCONTINUED | OUTPATIENT
Start: 2023-05-25 | End: 2023-05-25

## 2023-05-25 RX ORDER — FAMOTIDINE 20 MG/1
20 TABLET, FILM COATED ORAL 2 TIMES DAILY
Status: DISCONTINUED | OUTPATIENT
Start: 2023-05-25 | End: 2023-05-26 | Stop reason: HOSPADM

## 2023-05-25 RX ORDER — FENTANYL CITRATE 50 UG/ML
INJECTION, SOLUTION INTRAMUSCULAR; INTRAVENOUS PRN
Status: DISCONTINUED | OUTPATIENT
Start: 2023-05-25 | End: 2023-05-25

## 2023-05-25 RX ORDER — VANCOMYCIN HYDROCHLORIDE 1 G/20ML
INJECTION, POWDER, LYOPHILIZED, FOR SOLUTION INTRAVENOUS PRN
Status: DISCONTINUED | OUTPATIENT
Start: 2023-05-25 | End: 2023-05-25 | Stop reason: HOSPADM

## 2023-05-25 RX ORDER — CEFAZOLIN SODIUM 1 G/3ML
1 INJECTION, POWDER, FOR SOLUTION INTRAMUSCULAR; INTRAVENOUS EVERY 8 HOURS
Status: COMPLETED | OUTPATIENT
Start: 2023-05-25 | End: 2023-05-26

## 2023-05-25 RX ORDER — NALOXONE HYDROCHLORIDE 0.4 MG/ML
0.4 INJECTION, SOLUTION INTRAMUSCULAR; INTRAVENOUS; SUBCUTANEOUS
Status: DISCONTINUED | OUTPATIENT
Start: 2023-05-25 | End: 2023-05-26 | Stop reason: HOSPADM

## 2023-05-25 RX ORDER — FENTANYL CITRATE 0.05 MG/ML
25 INJECTION, SOLUTION INTRAMUSCULAR; INTRAVENOUS EVERY 5 MIN PRN
Status: DISCONTINUED | OUTPATIENT
Start: 2023-05-25 | End: 2023-05-25 | Stop reason: HOSPADM

## 2023-05-25 RX ORDER — ONDANSETRON 2 MG/ML
4 INJECTION INTRAMUSCULAR; INTRAVENOUS EVERY 30 MIN PRN
Status: DISCONTINUED | OUTPATIENT
Start: 2023-05-25 | End: 2023-05-25 | Stop reason: HOSPADM

## 2023-05-25 RX ORDER — EPHEDRINE SULFATE 50 MG/ML
INJECTION, SOLUTION INTRAMUSCULAR; INTRAVENOUS; SUBCUTANEOUS PRN
Status: DISCONTINUED | OUTPATIENT
Start: 2023-05-25 | End: 2023-05-25

## 2023-05-25 RX ORDER — ONDANSETRON 4 MG/1
4 TABLET, ORALLY DISINTEGRATING ORAL EVERY 30 MIN PRN
Status: DISCONTINUED | OUTPATIENT
Start: 2023-05-25 | End: 2023-05-25 | Stop reason: HOSPADM

## 2023-05-25 RX ORDER — METHOCARBAMOL 500 MG/1
500 TABLET, FILM COATED ORAL 4 TIMES DAILY
Status: DISCONTINUED | OUTPATIENT
Start: 2023-05-25 | End: 2023-05-25

## 2023-05-25 RX ORDER — ASPIRIN 81 MG/1
81 TABLET ORAL 2 TIMES DAILY
Status: DISCONTINUED | OUTPATIENT
Start: 2023-05-25 | End: 2023-05-26 | Stop reason: HOSPADM

## 2023-05-25 RX ORDER — HYDROXYZINE HYDROCHLORIDE 25 MG/1
50 TABLET, FILM COATED ORAL EVERY 6 HOURS PRN
Status: DISCONTINUED | OUTPATIENT
Start: 2023-05-25 | End: 2023-05-26 | Stop reason: HOSPADM

## 2023-05-25 RX ORDER — ACETAMINOPHEN 325 MG/1
975 TABLET ORAL EVERY 8 HOURS
Status: DISCONTINUED | OUTPATIENT
Start: 2023-05-25 | End: 2023-05-26 | Stop reason: HOSPADM

## 2023-05-25 RX ORDER — OXYCODONE HYDROCHLORIDE 5 MG/1
5 TABLET ORAL EVERY 4 HOURS PRN
Status: DISCONTINUED | OUTPATIENT
Start: 2023-05-25 | End: 2023-05-25 | Stop reason: SINTOL

## 2023-05-25 RX ORDER — ONDANSETRON 4 MG/1
4 TABLET, ORALLY DISINTEGRATING ORAL EVERY 6 HOURS PRN
Status: DISCONTINUED | OUTPATIENT
Start: 2023-05-25 | End: 2023-05-26 | Stop reason: HOSPADM

## 2023-05-25 RX ORDER — BISACODYL 10 MG
10 SUPPOSITORY, RECTAL RECTAL DAILY PRN
Status: DISCONTINUED | OUTPATIENT
Start: 2023-05-25 | End: 2023-05-26 | Stop reason: HOSPADM

## 2023-05-25 RX ORDER — SODIUM CHLORIDE, SODIUM LACTATE, POTASSIUM CHLORIDE, CALCIUM CHLORIDE 600; 310; 30; 20 MG/100ML; MG/100ML; MG/100ML; MG/100ML
INJECTION, SOLUTION INTRAVENOUS CONTINUOUS
Status: DISCONTINUED | OUTPATIENT
Start: 2023-05-25 | End: 2023-05-26 | Stop reason: HOSPADM

## 2023-05-25 RX ORDER — ACETAMINOPHEN 325 MG/1
975 TABLET ORAL ONCE
Status: COMPLETED | OUTPATIENT
Start: 2023-05-25 | End: 2023-05-25

## 2023-05-25 RX ORDER — MAGNESIUM HYDROXIDE 1200 MG/15ML
LIQUID ORAL PRN
Status: DISCONTINUED | OUTPATIENT
Start: 2023-05-25 | End: 2023-05-25 | Stop reason: HOSPADM

## 2023-05-25 RX ORDER — METHOCARBAMOL 500 MG/1
500 TABLET, FILM COATED ORAL EVERY 6 HOURS PRN
Status: DISCONTINUED | OUTPATIENT
Start: 2023-05-25 | End: 2023-05-26 | Stop reason: HOSPADM

## 2023-05-25 RX ORDER — ACETAMINOPHEN 325 MG/1
650 TABLET ORAL EVERY 4 HOURS PRN
Status: DISCONTINUED | OUTPATIENT
Start: 2023-05-28 | End: 2023-05-26 | Stop reason: HOSPADM

## 2023-05-25 RX ORDER — BUPIVACAINE HYDROCHLORIDE 7.5 MG/ML
INJECTION, SOLUTION INTRASPINAL PRN
Status: DISCONTINUED | OUTPATIENT
Start: 2023-05-25 | End: 2023-05-25

## 2023-05-25 RX ORDER — TRAMADOL HYDROCHLORIDE 50 MG/1
50 TABLET ORAL EVERY 6 HOURS PRN
Status: DISCONTINUED | OUTPATIENT
Start: 2023-05-25 | End: 2023-05-26 | Stop reason: HOSPADM

## 2023-05-25 RX ORDER — HYDROMORPHONE HCL IN WATER/PF 6 MG/30 ML
0.4 PATIENT CONTROLLED ANALGESIA SYRINGE INTRAVENOUS
Status: DISCONTINUED | OUTPATIENT
Start: 2023-05-25 | End: 2023-05-26 | Stop reason: HOSPADM

## 2023-05-25 RX ORDER — POLYETHYLENE GLYCOL 3350 17 G/17G
17 POWDER, FOR SOLUTION ORAL DAILY
Status: DISCONTINUED | OUTPATIENT
Start: 2023-05-26 | End: 2023-05-26 | Stop reason: HOSPADM

## 2023-05-25 RX ORDER — HYDROMORPHONE HCL IN WATER/PF 6 MG/30 ML
0.2 PATIENT CONTROLLED ANALGESIA SYRINGE INTRAVENOUS
Status: DISCONTINUED | OUTPATIENT
Start: 2023-05-25 | End: 2023-05-26 | Stop reason: HOSPADM

## 2023-05-25 RX ORDER — CEFAZOLIN SODIUM/WATER 2 G/20 ML
2 SYRINGE (ML) INTRAVENOUS SEE ADMIN INSTRUCTIONS
Status: DISCONTINUED | OUTPATIENT
Start: 2023-05-25 | End: 2023-05-25 | Stop reason: HOSPADM

## 2023-05-25 RX ADMIN — ROSUVASTATIN CALCIUM 10 MG: 10 TABLET, FILM COATED ORAL at 21:38

## 2023-05-25 RX ADMIN — TRANEXAMIC ACID 1950 MG: 650 TABLET ORAL at 12:06

## 2023-05-25 RX ADMIN — PROPOFOL 70 MCG/KG/MIN: 10 INJECTION, EMULSION INTRAVENOUS at 12:40

## 2023-05-25 RX ADMIN — TRAMADOL HYDROCHLORIDE 50 MG: 50 TABLET, COATED ORAL at 23:36

## 2023-05-25 RX ADMIN — SODIUM CHLORIDE, POTASSIUM CHLORIDE, SODIUM LACTATE AND CALCIUM CHLORIDE: 600; 310; 30; 20 INJECTION, SOLUTION INTRAVENOUS at 14:43

## 2023-05-25 RX ADMIN — MIDAZOLAM 1 MG: 1 INJECTION INTRAMUSCULAR; INTRAVENOUS at 12:28

## 2023-05-25 RX ADMIN — SODIUM CHLORIDE, POTASSIUM CHLORIDE, SODIUM LACTATE AND CALCIUM CHLORIDE: 600; 310; 30; 20 INJECTION, SOLUTION INTRAVENOUS at 12:28

## 2023-05-25 RX ADMIN — MIDAZOLAM 1 MG: 1 INJECTION INTRAMUSCULAR; INTRAVENOUS at 12:41

## 2023-05-25 RX ADMIN — SENNOSIDES AND DOCUSATE SODIUM 1 TABLET: 50; 8.6 TABLET ORAL at 21:04

## 2023-05-25 RX ADMIN — ACETAMINOPHEN 975 MG: 325 TABLET ORAL at 18:13

## 2023-05-25 RX ADMIN — PANTOPRAZOLE SODIUM 40 MG: 40 TABLET, DELAYED RELEASE ORAL at 21:04

## 2023-05-25 RX ADMIN — BUPIVACAINE HYDROCHLORIDE IN DEXTROSE 12 MG: 7.5 INJECTION, SOLUTION SUBARACHNOID at 12:37

## 2023-05-25 RX ADMIN — FENTANYL CITRATE 50 MCG: 50 INJECTION, SOLUTION INTRAMUSCULAR; INTRAVENOUS at 12:28

## 2023-05-25 RX ADMIN — CEFAZOLIN 1 G: 1 INJECTION, POWDER, FOR SOLUTION INTRAMUSCULAR; INTRAVENOUS at 21:03

## 2023-05-25 RX ADMIN — SODIUM CHLORIDE, POTASSIUM CHLORIDE, SODIUM LACTATE AND CALCIUM CHLORIDE: 600; 310; 30; 20 INJECTION, SOLUTION INTRAVENOUS at 12:07

## 2023-05-25 RX ADMIN — ASPIRIN 81 MG: 81 TABLET, COATED ORAL at 21:04

## 2023-05-25 RX ADMIN — ACETAMINOPHEN 975 MG: 325 TABLET ORAL at 21:38

## 2023-05-25 RX ADMIN — PHENYLEPHRINE HYDROCHLORIDE 0.3 MCG/KG/MIN: 10 INJECTION INTRAVENOUS at 13:12

## 2023-05-25 RX ADMIN — SODIUM CHLORIDE, POTASSIUM CHLORIDE, SODIUM LACTATE AND CALCIUM CHLORIDE: 600; 310; 30; 20 INJECTION, SOLUTION INTRAVENOUS at 16:39

## 2023-05-25 RX ADMIN — CARVEDILOL 3.12 MG: 3.12 TABLET, FILM COATED ORAL at 18:13

## 2023-05-25 RX ADMIN — Medication 5 MG: at 13:48

## 2023-05-25 RX ADMIN — ONDANSETRON 4 MG: 2 INJECTION INTRAMUSCULAR; INTRAVENOUS at 12:49

## 2023-05-25 RX ADMIN — PHENYLEPHRINE HYDROCHLORIDE 50 MCG: 10 INJECTION INTRAVENOUS at 13:03

## 2023-05-25 RX ADMIN — FENTANYL CITRATE 50 MCG: 50 INJECTION, SOLUTION INTRAMUSCULAR; INTRAVENOUS at 12:41

## 2023-05-25 RX ADMIN — Medication 125 MCG: at 21:04

## 2023-05-25 RX ADMIN — ACETAMINOPHEN 975 MG: 325 TABLET ORAL at 12:05

## 2023-05-25 RX ADMIN — Medication 2 G: at 12:41

## 2023-05-25 RX ADMIN — FAMOTIDINE 20 MG: 20 TABLET ORAL at 21:04

## 2023-05-25 RX ADMIN — DEXAMETHASONE SODIUM PHOSPHATE 10 MG: 4 INJECTION, SOLUTION INTRA-ARTICULAR; INTRALESIONAL; INTRAMUSCULAR; INTRAVENOUS; SOFT TISSUE at 12:49

## 2023-05-25 ASSESSMENT — ACTIVITIES OF DAILY LIVING (ADL)
ADLS_ACUITY_SCORE: 33
ADLS_ACUITY_SCORE: 20
ADLS_ACUITY_SCORE: 20
ADLS_ACUITY_SCORE: 30
ADLS_ACUITY_SCORE: 20
ADLS_ACUITY_SCORE: 26
ADLS_ACUITY_SCORE: 30

## 2023-05-25 ASSESSMENT — LIFESTYLE VARIABLES: TOBACCO_USE: 0

## 2023-05-25 ASSESSMENT — ENCOUNTER SYMPTOMS
SEIZURES: 0
DYSRHYTHMIAS: 0

## 2023-05-25 ASSESSMENT — COPD QUESTIONNAIRES: COPD: 0

## 2023-05-25 NOTE — PROGRESS NOTES
"[facility]  Consult Note - Hospitalist Service      Date of Admission:  5/25/2023  Consult Requested by: Orthopedics  Reason for Consult: Medical comanagement and med rec    Assessment & Plan   Madonna Duque is a 72 year old female with PMH significant for hypertension, hyperlipidemia, GERD, osteoporosis and osteoarthritis who was admitted to [facility] on 5/25/2023 by the Orthopedic service for right total hip arthroplasty with Dr. Thomas. Preoperative H&P reviewed.    Given the above, will defer formal consult. Routine post-operative cares, IVF, DVT prophylaxis, and pain management to orthopedic service. Will check some basic lab work in the AM to assess for acute blood loss anemia given surgery. PT and OT to assess per Ortho. Bowel regimen in place while on pain regimen. No changes to PTA medication regimen at discharge unless issues arise throughout stay.      Vital signs:   BP (!) 154/78 (Cuff Size: Adult Regular)   Pulse 58   Temp 96.9  F (36.1  C) (Temporal)   Resp 16   Ht 1.626 m (5' 4\")   Wt 71.7 kg (158 lb 1.6 oz)   SpO2 100%   BMI 27.14 kg/m      Diet:   adat following surgery  DVT Prophylaxis: Defer to primary service  Carrera Catheter: Not present  Code Status:   defer to primary service    Plan:  - Routine postoperative cares per primary service, including IVF, pain control, abx, DVT ppx, and disposition  - PT/OT as per primary service  - Aggressive pulmonary toilet, frequent IS use 10x/hour while awake  - Appropriate PTA medications resumed  - PRN IV antihypertensives available for SBP >180  - Defer formal consultation. Hospitalist will sign off.       *Please reconsult if medical issues arise that require Hospitalist assistance      No charge note.     AARON Tamayo CNP  [facility]  Securely message with the Vocera Web Console (learn more here)  Text page via [employer] Paging/Directory                 "

## 2023-05-25 NOTE — ANESTHESIA PROCEDURE NOTES
"Intrathecal Procedure Note    Pre-Procedure   Staff -        Anesthesiologist:  Vinay Mei MD       Performed By: Anesthesiologist       Location: OR       Pre-Anesthestic Checklist: patient identified, IV checked, risks and benefits discussed, informed consent, monitors and equipment checked, pre-op evaluation and at physician/surgeon's request  Timeout:       Correct Patient: Yes        Correct Procedure: Yes        Correct Site: Yes        Correct Position: Yes   Procedure Documentation  Procedure: intrathecal       Patient Position: sitting       Patient Prep/Sterile Barriers: sterile gloves, mask, patient draped       Skin prep: Chloraprep       Insertion Site: L3-4. (midline approach).       Spinal Needle Type: Pencan       Introducer used       Introducer: 20 G       # of attempts: 1 and  # of redirects:  0    Assessment/Narrative         Paresthesias: No.       CSF fluid: clear.     Comments:  The patient was prepped and draped in a sterile fashion.  Topical anesthesia was injected subcutaneously using 1% lidocaine.  A 25G Pencan needle was advanced via a 20 G introducer at the the L3-4 level.  Clear CSF obtained, with birefringence on aspiration.  CSF freely aspirated after injection of 10.5 mg bupivacaine.  No paresthesias reported by patient, who tolerated the procedure well.      FOR [facility] ([facility]) ONLY:   Pain Team Contact information: please page the Pain Team Via DLC. Search \"Pain\". During daytime hours, please page the attending first. At night please page the resident first.      "

## 2023-05-25 NOTE — PROCEDURES
OPERATIVE REPORT - [facility]    DATE OF SERVICE:  5/25/2023    ATTENDING: QUINN JACOB    SURGEON:  QUINN JACOB    ASSISTANT:   Salome Kirby PA-C      PREOPERATIVE DIAGNOSIS:  Right hip osteoarthritis    POSTOPERATIVE DIAGNOSIS:    Same  Right hip chronic abductor tear    PROCEDURES PERFORMED:   Right Total Hip Arthroplasty   Repair Right abductor tendon.    ANESTHESIA:  Spinal    ESTIMATED BLOOD LOSS:   100 mL    TRANSFUSIONS:  none    FLUIDS:   Per anesthesia    SPECIMENS:  Arthroplasty resection materials.    DRAIN:  none    COMPLICATIONS:  none    INDICATIONS:   The patient is a very pleasant 72 year old female who has had persistent complaints of hip pain for some time now. The pain interferes with activities of daily living including sitting, standing, and ambulating short distances. The physical examination showed a painful and limited range of motion of the right hip. The x-ray showed bone-on-bone arthritis in his right hip.      The patient has tried nonoperative measures to control his pain including Tylenol, oral anti-inflammatories, activity modification, low impact exercises, assistive devices, injections, none of which have provided satisfactory pain relief. The patient desires joint replacement of the hip to improve their lifestyle and reduce their pain.      Preoperatively, the risks, benefits, and possible complications of the procedure were discussed. The risks discussed included but were not limited to wear, loosening, infection, neurovascular damage, thromboembolic disease, foot drop, limb length inequality, persistent pain, stiffness and dislocation. All of the questions were satisfactorily answered and the patient wished to proceed with joint replacement surgery to improve their lifestyle and reduce their pain.        IMPLANTS:  1. Acetabular component:  Daisy Trident II PSL 56 mm O.D.   2. Acetabular liner: Daisy X3 36 mm I.D. 0 degree  3. Acetabular dome screws:  6.5 x 20 and 6.5  x 35 mm (hex)  4. Femoral component: Nitro [place] No. 2 44 mm offset 150 mm length  5. Femoral head:  Cuco Biolox 36 mm +2.5   6. Centralized: Large [place]  7. Cement Restrictor: medium  8. Bone Cement: Simplex     PROCEDURE:  The patient was brought to the operating room on a bed.  After adequate induction of spinal anesthesia, the patient was rolled into the decubitus position with the right side up.  All bony prominences were padded and an axillary roll was placed.  The hip and leg were then prepped and draped free in the usual sterile fashion using a Betadine scrub and a ChloraPrep paint.    At this point a time-out procedure was carried out to identify the patient, the appropriate operative side, the implants, the code status, the fire risk, the preoperative medications and to confirm that the patient received 2 grams of ancef within one hour of the onset of the procedure.  Following completion of this, we proceeded with the case      A modified Kocher-Curtis exposure of the hip was utilized.  Skin, subcutaneous tissue and fascia was incised at the interval between the tensor fascia dominique and the gluteus jose armando as best determined and retracted.  The piriformis tendon was identified, tagged and divided at its trochanteric insertion and retracted posteriorly for protection of the sciatic nerve.   Then the gluteus minimus was elevated from the posterior capsule and retracted anteriorly. A posterior capsulotomy was performed in the shape of a T, dividing the short external rotators at the trochanteric insertion and protecting the gluteus medius.  The flaps were tagged and retracted allowing for posterior dislocation of the femoral head.  Femoral neck osteotomy was performed using appropriate template and oscillating saw. Femoral head was delivered from the wound and attention was turned to the femur.    Access to the intramedullary canal was gained using the tapered awl.  The femur was serial broached up to a  size No. 2 44 mm offset. It seated down and was just slightly proud to our calcar surface.   The broach was axially and rotationally stable. The broach was removed.  A femoral wick was placed to aid in hemostasis    The labrum and soft tissue were removed from the bony acetabulum rim.  The pulvinar and osteophytes were removed from the Haversian notch.  The acetabulum was sequentially reamed in 2 and 1 mm increments up to a 56 mm reamer. The acetabulum had exposed bleeding subchondral bone.  Appropriate position, fixation and coverage for a 56 mm acetabular shell was confirmed.  The shell was brought up and impacted it into position. The acetabular shell was stable with impaction. The acetabular dome screws were placed. The peripheral rim osteophytes were removed using a curved half-inch osteotome. The acetabular liner was impacted into position. Attention was turned to trialing.    The trial femoral component was inserted again. The 44 mm offset neck trial was used.  A 36 mm head with a +2.5 mm neck were used. The limb lengths clinically appeared to be identical.   The hip was stable in flexion, adduction, internal rotation as well as extension and external rotation.      Satisfied with the component alignment, hip stability, limb lengths and range of motion, the hip was dislocated.  The trial components were removed.  The femur was prepared for cementing.  The femur was brushed and irrigated.  The medium cement restrictor was placed.  Two batches of Simplex bone cement were vacuum mixed.  The femur was filled in a retrograde fashion with cement and pressurized.  The size No. 2 44 mm offset 150 mm femoral component was inserted to a stable position.   It seated down to the calcar.  The stem was held in position until the cemented hardened. The stem was stable to both axial and rotational stresses. The Lewis taper was cleaned and dried. The hip stability, and length were checked again with the real femoral component  in placed.  The final 36 mm outer diameter head with a +2.5 mm neck was impacted into place. The acetabulum was inspected to ensure it was free of debris. The hip was reduced in an atraumatic fashion. The limb lengths were checked. The length of the legs clinically appeared to be near identical.  The hip was stable on flexion, adduction, internal rotation as well as extension, external rotation. The wounds were copiously irrigated and began a layered closure was performed.      The posterior capsule was closed with #1 Vicryl sutures in an interrupted fashion. Piriform tendon was reattached to the posterior superior aspect of the greater trochanter using a #1 Vicryl suture.  At this point we freed up the anterior torn abductor tendon.  Using 2 larger #2 Ethibond sutures through the tendon and then through bone tunnels we reapproximated the tendon to the bleeding bed of bone.  The repair was then over sown with #1 Vicryl suture. The fascia was closed using #1 Vicryl sutures in an interrupted fashion. The subcutaneous tissue was then closed in two layers using 0 and 2-0 Vicryl sutures.  The skin was brought together, everted and approximated with staples.  Sterile dressings were applied.  Pillows were placed between the legs.  The patient was rolled supine and transported to the PACU in stable condition. At the end of the case, sponge and needle counts were correct.     Hemostasis was obtained throughout the procedure and prior to closure of each layer using electrocautery.  Pulsatile irrigation and dilute betadine irrigation were used during the procedure.   Surgical team body exhaust systems and high exchange air flow were used during the procedure as well.  The patient received 2 grams of ancef prior to the onset of the procedure for antibiotic prophylaxis.      POSTOPERATIVE PLAN:   1. 50% WB for the right lower extremity.   2. Antibiotic Prophylaxis were given 2 grams of ancef. Antibiotics were given within 1 hour  of surgical incision and discontinued within 24 hours.  3. Pain control with Oxycodone, Tylenol and Celebrex  4. Follow up with Dr. Thomas in 10-14 days. [phone].   5.  DVT prophylaxis aspirin and sequential compression devices will be started within 24 hours of surgery.  6. Plan discharge when meeting therapy goals and patient is medically stable  7. Transfusion requirements to be allogenic/autogenic in nature.   8. Caution with NSAID usage.  9. Will keep on walker for 2 weeks postoperatively.    Danny Thomas MD  [facility]  [phone]  [phone]

## 2023-05-25 NOTE — ANESTHESIA POSTPROCEDURE EVALUATION
Patient: Madonna Duque    Procedure: Procedure(s):  Right total hip arthroplasty       Anesthesia Type:  Spinal    Note:     Postop Pain Control: Uneventful            Sign Out: Well controlled pain   PONV:    Neuro/Psych: Uneventful            Sign Out: Acceptable/Baseline neuro status   Airway/Respiratory: Uneventful            Sign Out: Acceptable/Baseline resp. status   CV/Hemodynamics: Uneventful            Sign Out: Acceptable CV status; No obvious hypovolemia; No obvious fluid overload   Other NRE:    DID A NON-ROUTINE EVENT OCCUR?            Last vitals:  Vitals Value Taken Time   /61 05/25/23 1545   Temp 36.4  C (97.5  F) 05/25/23 1500   Pulse 60 05/25/23 1550   Resp 11 05/25/23 1550   SpO2 100 % 05/25/23 1551   Vitals shown include unvalidated device data.    Electronically Signed By: Vinay Mei MD  May 25, 2023  6:27 PM  
6

## 2023-05-25 NOTE — ADDENDUM NOTE
Addendum  created 05/25/23 1828 by Vinay Mei MD    Child order released for a procedure order, Clinical Note Signed, Intraprocedure Blocks edited, SmartForm saved

## 2023-05-25 NOTE — PLAN OF CARE
Goal Outcome Evaluation:      Plan of Care Reviewed With: patient, spouse, child    Overall Patient Progress: improvingOverall Patient Progress: improving    Outcome Evaluation: POD#0.  Discharge to Home w/ Family 05/26/2023.      Patient vital signs are at baseline: Yes  Patient able to ambulate as they were prior to admission or with assist devices provided by therapies during their stay:  Yes  Patient MUST void prior to discharge:  Yes    Patient able to tolerate oral intake:  Yes  Pain has adequate pain control using Oral analgesics:  Yes  Does patient have an identified :  Yes   Spouse  Has goal D/C date and time been discussed with patient:  Yes   Discharge to Home w/ Family 05/26/2023.    A&O x4.   CMS Intact.   VSS on RA.   Up with Ax1 GB and walker.   Voiding in BR.   Denies Pain.   Continue to monitor.

## 2023-05-25 NOTE — ANESTHESIA CARE TRANSFER NOTE
Patient: Madonna Duque    Procedure: Procedure(s):  Right total hip arthroplasty       Diagnosis: Primary osteoarthritis of right hip [M16.11]  Diagnosis Additional Information: No value filed.    Anesthesia Type:   Spinal     Note:    Oropharynx: spontaneously breathing  Level of Consciousness: awake  Oxygen Supplementation: face mask  Level of Supplemental Oxygen (L/min / FiO2): 6  Independent Airway: airway patency satisfactory and stable  Dentition: dentition unchanged  Vital Signs Stable: post-procedure vital signs reviewed and stable  Report to RN Given: handoff report given  Patient transferred to: PACU  Comments: At end of procedure, spontaneous respirations, patient alert to voice, able to follow commands. Oxygen via facemask at 6 liters per minute to PACU. Oxygen tubing connected to wall O2 in PACU, SpO2, NiBP, and EKG monitors and alarms on and functioning, Nanette Hugger warmer connected to patient gown, report on patient's clinical status given to PACU RN, RN questions answered.  Handoff Report: Identifed the Patient, Identified the Reponsible Provider, Reviewed the pertinent medical history, Discussed the surgical course, Reviewed Intra-OP anesthesia mangement and issues during anesthesia, Set expectations for post-procedure period and Allowed opportunity for questions and acknowledgement of understanding      Vitals:  Vitals Value Taken Time   /60 05/25/23 1450   Temp     Pulse 62 05/25/23 1451   Resp 14 05/25/23 1451   SpO2 93 % 05/25/23 1451   Vitals shown include unvalidated device data.    Electronically Signed By: AARON Garcia CRNA  May 25, 2023  2:52 PM

## 2023-05-25 NOTE — ANESTHESIA PREPROCEDURE EVALUATION
Anesthesia Pre-Procedure Evaluation    Patient: Madonna Duque   MRN: [id number] : 1950        Procedure : Procedure(s):  Right total hip arthroplasty          Past Medical History:   Diagnosis Date     Abdominal discomfort 2013    ct abd and pelvis no cause found     Cancer (H) 2017    Basal cell left forarm     Chest pain 2014    neg est echo     Colonic polyp     fu  nl and to fu      Coronary artery disease involving native coronary artery of native heart without angina pectoris     NSTEMI/Vfib arrest/stents x2     Dizzy     mri brain nl     DJD (degenerative joint disease)     neck     Elevated blood sugar      GERD (gastroesophageal reflux disease)     egd, benign stricture     Hypercholesteremia     crestor intolerant, lipitor intolerant     CORINA (iron deficiency anaemia)     ugi and sbft nl, be nl     Left ventricular diastolic dysfunction 2016    echo done for fu and nl ef, no wma     Osteopenia     fu dexa  -1.9 fem neck and -1.2 spine, stable c/w      Osteopenia of both hips 10/2019    screening dexa     PONV (postoperative nausea and vomiting)      STEMI (ST elevation myocardial infarction) (H) 2015    in [place] on cruise, lytics then ptca of lad, 2 stents, had vfib arrest, ef on angio 67%, 40% rca, no other dz; fu est echo  nl     Syncope     episode 2017, possible vasovagal     Tubular adenoma of colon 2021    fu 5 years     Urethral strictures      Ventricular fibrillation (H) 2015    during mi     Vitamin D deficiency       Past Surgical History:   Procedure Laterality Date     APPENDECTOMY      appendectomy done at same time as oopherectomy     CARDIAC SURGERY      2 stents LAD     COLONOSCOPY  2021    2 pre cancerous polyps     COSMETIC SURGERY  2021    Moes procedure, squamous cell nose     HC REMOVAL OF OVARY/TUBE(S)  81    Salpingo-Oophorectomy, Unilateral(endometriosis)     LAPAROTOMY  EXPLORATORY  80    due to endomet, removed ovary     OTHER SURGICAL HISTORY  2015    stents x2 to LAD     REPAIR PTOSIS BILATERAL  2012    Procedure:REPAIR PTOSIS BILATERAL; BILATERAL UPPER LID MECHANICAL PTOSIS REPAIR,and ptosis repair; Surgeon:JOVANA BETANCUR; Location:[facility] LIGATE FALLOPIAN TUBE,POSTPARTUM  85    Tubal Ligation      Allergies   Allergen Reactions     Crestor [Rosuvastatin]      Muscle aches, debilitating     Morphine And Related Nausea and Vomiting     Shrimp Nausea and Vomiting      Social History     Tobacco Use     Smoking status: Former     Packs/day: 0.50     Years: 10.00     Pack years: 5.00     Types: Cigarettes     Start date: 10/1/1973     Quit date: 1982     Years since quittin.3     Smokeless tobacco: Never     Tobacco comments:     quit 23 years ago   Vaping Use     Vaping status: Never Used   Substance Use Topics     Alcohol use: No     Alcohol/week: 0.0 standard drinks of alcohol      Wt Readings from Last 1 Encounters:   23 71.7 kg (158 lb 1.6 oz)        Anesthesia Evaluation   Pt has had prior anesthetic.     History of anesthetic complications  - PONV.      ROS/MED HX  ENT/Pulmonary:    (-) tobacco use, asthma, COPD and sleep apnea   Neurologic:    (-) no seizures and no CVA   Cardiovascular: Comment: H/o ventricular fibrillation    (+) Dyslipidemia --CAD --stent-. 2 Previous cardiac testing   Echo: Date: Results:    Stress Test: Date:  Results:  Exercise stress echocardiogram negative for evidence of stress-induced wall  motion abnormalities.  Stress ECG abnormal. At peak exercise, there are 1.5 mm horizontal ST  depressions in leads II, V3-4, and 1mm horizontal ST depressions in leads III,  aVF, V5-6. ST segments return to baseline by 03:57 into Recovery.  No chest pain or symptoms concerning for angina with exercise.     Sensitivity reduced, post-stress images were obtained at below target HR (post  LAX 95 bpm, post SAX 85 bpm, post AP4  107 bpm, post AP2 101 bpm).  Target heart rate achieved (85% of max predicted  bpm, patient achieved  99% of max predicted HR peak 146 bpm).  Stress test terminated due to fatigue.  Normal resting LV function with EF of approximately 60-65%; normal response to  exercise with increase to approximately 70-75% and appropriate decrease in  chamber size.  Normal wall motion at rest and at peak stress.  Normal blood pressure response to exercise.  Rest ECG normal sinus rhythm.     On routine screening 2D echocardiogram and Doppler examination, normal  biventricular function, no significant valvular dysfunction, aortic root  normal in size.  ECG Reviewed: Date: 5/23 Results:  SB at 55 bpm  Cath: Date: Results:   (-) hypertension, CHF and arrhythmias   METS/Exercise Tolerance:     Hematologic:       Musculoskeletal:   (+) arthritis,     GI/Hepatic:     (+) GERD,  (-) liver disease   Renal/Genitourinary:    (-) renal disease   Endo:    (-) Type I DM and Type II DM   Psychiatric/Substance Use:       Infectious Disease:       Malignancy:       Other:            Physical Exam    Airway        Mallampati: II   TM distance: > 3 FB   Neck ROM: full   Mouth opening: > 3 cm    Respiratory Devices and Support         Dental       (+) Minor Abnormalities - some fillings, tiny chips      Cardiovascular          Rhythm and rate: regular     Pulmonary           breath sounds clear to auscultation           OUTSIDE LABS:  CBC:   Lab Results   Component Value Date    WBC 5.6 05/08/2023    WBC 6.3 02/10/2023    HGB 13.7 05/08/2023    HGB 13.9 02/10/2023    HCT 41.8 05/08/2023    HCT 41.8 02/10/2023     05/08/2023     02/10/2023     BMP:   Lab Results   Component Value Date     05/08/2023     01/02/2023    POTASSIUM 4.3 05/08/2023    POTASSIUM 4.1 01/02/2023    CHLORIDE 104 05/08/2023    CHLORIDE 106 01/02/2023    CO2 25 05/08/2023    CO2 29 01/02/2023    BUN 13.2 05/08/2023    BUN 18 01/02/2023    CR 0.72  05/08/2023    CR 0.62 01/02/2023     (H) 05/08/2023     (H) 01/02/2023     COAGS: No results found for: PTT, INR, FIBR  POC: No results found for: BGM, HCG, HCGS  HEPATIC:   Lab Results   Component Value Date    ALBUMIN 4.0 01/03/2022    PROTTOTAL 7.7 01/03/2022    ALT 24 01/03/2022    AST 23 01/03/2022    ALKPHOS 88 01/03/2022    BILITOTAL 0.7 01/03/2022     OTHER:   Lab Results   Component Value Date    LACT 0.6 (L) 10/15/2020    A1C 6.3 (H) 02/10/2023    SHOAIB 9.9 05/08/2023    AMYLASE 69 12/30/2013    TSH 0.93 08/31/2018       Anesthesia Plan    ASA Status:  3      Anesthesia Type: Spinal.              Consents    Anesthesia Plan(s) and associated risks, benefits, and realistic alternatives discussed. Questions answered and patient/representative(s) expressed understanding.    - Discussed:     - Discussed with:  Patient         Postoperative Care    Pain management: Multi-modal analgesia.   PONV prophylaxis: Ondansetron (or other 5HT-3)     Comments:                Vinay Mei MD

## 2023-05-26 ENCOUNTER — APPOINTMENT (OUTPATIENT)
Dept: PHYSICAL THERAPY | Facility: CLINIC | Age: 73
End: 2023-05-26
Attending: ORTHOPAEDIC SURGERY
Payer: COMMERCIAL

## 2023-05-26 VITALS
BODY MASS INDEX: 26.99 KG/M2 | HEIGHT: 64 IN | HEART RATE: 63 BPM | RESPIRATION RATE: 17 BRPM | DIASTOLIC BLOOD PRESSURE: 69 MMHG | OXYGEN SATURATION: 98 % | SYSTOLIC BLOOD PRESSURE: 131 MMHG | TEMPERATURE: 98.1 F | WEIGHT: 158.1 LBS

## 2023-05-26 LAB
FASTING STATUS PATIENT QL REPORTED: NO
GLUCOSE SERPL-MCNC: 131 MG/DL (ref 70–99)
HGB BLD-MCNC: 12.4 G/DL (ref 11.7–15.7)

## 2023-05-26 PROCEDURE — 85018 HEMOGLOBIN: CPT | Performed by: SPECIALIST/TECHNOLOGIST

## 2023-05-26 PROCEDURE — 250N000011 HC RX IP 250 OP 636: Performed by: SPECIALIST/TECHNOLOGIST

## 2023-05-26 PROCEDURE — 97116 GAIT TRAINING THERAPY: CPT | Mod: GP | Performed by: PHYSICAL THERAPIST

## 2023-05-26 PROCEDURE — 36415 COLL VENOUS BLD VENIPUNCTURE: CPT | Performed by: ORTHOPAEDIC SURGERY

## 2023-05-26 PROCEDURE — 250N000013 HC RX MED GY IP 250 OP 250 PS 637: Performed by: SPECIALIST/TECHNOLOGIST

## 2023-05-26 PROCEDURE — 97110 THERAPEUTIC EXERCISES: CPT | Mod: GP | Performed by: PHYSICAL THERAPIST

## 2023-05-26 PROCEDURE — 82947 ASSAY GLUCOSE BLOOD QUANT: CPT | Performed by: ORTHOPAEDIC SURGERY

## 2023-05-26 PROCEDURE — 250N000013 HC RX MED GY IP 250 OP 250 PS 637: Performed by: PHYSICIAN ASSISTANT

## 2023-05-26 RX ORDER — POLYETHYLENE GLYCOL 3350 17 G/17G
1 POWDER, FOR SOLUTION ORAL DAILY
Qty: 7 PACKET | Refills: 0 | Status: SHIPPED | OUTPATIENT
Start: 2023-05-26 | End: 2024-01-19

## 2023-05-26 RX ORDER — ASPIRIN 81 MG/1
81 TABLET ORAL 2 TIMES DAILY
Qty: 90 TABLET | Refills: 0 | Status: SHIPPED | OUTPATIENT
Start: 2023-05-26 | End: 2024-01-04

## 2023-05-26 RX ORDER — ACETAMINOPHEN 325 MG/1
650 TABLET ORAL EVERY 4 HOURS PRN
Qty: 100 TABLET | Refills: 0 | Status: SHIPPED | OUTPATIENT
Start: 2023-05-26

## 2023-05-26 RX ORDER — AMOXICILLIN 250 MG
1-2 CAPSULE ORAL 2 TIMES DAILY
Qty: 30 TABLET | Refills: 0 | Status: SHIPPED | OUTPATIENT
Start: 2023-05-26 | End: 2024-01-04

## 2023-05-26 RX ORDER — TRAMADOL HYDROCHLORIDE 50 MG/1
50 TABLET ORAL EVERY 6 HOURS PRN
Qty: 25 TABLET | Refills: 0 | Status: SHIPPED | OUTPATIENT
Start: 2023-05-26 | End: 2024-01-04

## 2023-05-26 RX ADMIN — Medication 125 MCG: at 08:39

## 2023-05-26 RX ADMIN — ASPIRIN 81 MG: 81 TABLET, COATED ORAL at 08:40

## 2023-05-26 RX ADMIN — PANTOPRAZOLE SODIUM 40 MG: 40 TABLET, DELAYED RELEASE ORAL at 08:39

## 2023-05-26 RX ADMIN — ACETAMINOPHEN 975 MG: 325 TABLET ORAL at 05:06

## 2023-05-26 RX ADMIN — POLYETHYLENE GLYCOL 3350 17 G: 17 POWDER, FOR SOLUTION ORAL at 08:40

## 2023-05-26 RX ADMIN — CARVEDILOL 3.12 MG: 3.12 TABLET, FILM COATED ORAL at 08:39

## 2023-05-26 RX ADMIN — CEFAZOLIN 1 G: 1 INJECTION, POWDER, FOR SOLUTION INTRAMUSCULAR; INTRAVENOUS at 05:07

## 2023-05-26 RX ADMIN — SENNOSIDES AND DOCUSATE SODIUM 1 TABLET: 50; 8.6 TABLET ORAL at 08:39

## 2023-05-26 RX ADMIN — FAMOTIDINE 20 MG: 20 TABLET ORAL at 08:39

## 2023-05-26 ASSESSMENT — ACTIVITIES OF DAILY LIVING (ADL)
ADLS_ACUITY_SCORE: 27
ADLS_ACUITY_SCORE: 30

## 2023-05-26 NOTE — PLAN OF CARE
Occupational Therapy: Orders received. Chart reviewed and discussed with care team.? Occupational Therapy not indicated. Spoke with PT, pt is a retired RN. Pt has good overall support at home from spouse and visits from daughter to assist with I/ADLs as needed. Pt mobilizing near baseline with PT.? Defer discharge recommendations to Care Team.? Will complete orders.

## 2023-05-26 NOTE — PLAN OF CARE
Physical Therapy Discharge Summary    Reason for therapy discharge:    Discharged to home.    Progress towards therapy goal(s). See goals on Care Plan in [facility] electronic health record for goal details.  Goals partially met.  Barriers to achieving goals:   discharge from facility.    Therapy recommendation(s):    Continue home exercise program.

## 2023-05-26 NOTE — PROGRESS NOTES
Orthopedic Surgery  Madonna Duque  2023  Admit Date:  2023  POD 1 Day Post-Op  S/P Procedure(s):  Right total hip arthroplasty    Patient is feeling well.  Pain controlled.  Tolerating oral intake.  No events overnight. Discussed pain and recovery expectations with patient and spouse.  Discharge instructions reviewed.    Alert and orient to person, place, and time.  Vital Sign Ranges  Temperature Temp  Av.6  F (36.4  C)  Min: 96.9  F (36.1  C)  Max: 98.1  F (36.7  C)   Blood pressure Systolic (24hrs), Av , Min:102 , Max:157        Diastolic (24hrs), Av, Min:60, Max:78      Pulse Pulse  Av  Min: 55  Max: 75   Respirations Resp  Av.9  Min: 9  Max: 25   Pulse oximetry SpO2  Av.6 %  Min: 95 %  Max: 100 %       Right hip aquacel is clean, dry, and intact. Minimal erythema of the surrounding skin.  Bilateral calves are soft, non-tender.  right lower extremity is NVI.    Labs:  Recent Labs   Lab Test 23  0926 23  0921 22  1018   POTASSIUM 4.3 4.1 4.5     Recent Labs   Lab Test 23  0737 23  0926 02/10/23  1049   HGB 12.4 13.7 13.9     No results for input(s): INR in the last [id number] hours.  Recent Labs   Lab Test 23  0926 02/10/23  1049 22  1018    235 270       A/P  1. S/p right OSCAR with abductor tendon repair   Continue aspirin 81mg bid x 6 weeks for DVT prophylaxis.     Mobilize with PT/OT 50% WB with a walker.     Continue current pain regimen-tramadol.    2. Disposition   Anticipate d/c to home today with family.    Kjerstin L Foss, PA-C

## 2023-05-26 NOTE — PLAN OF CARE
Goal Outcome Evaluation:       Patient vital signs are at baseline: Yes  Patient able to ambulate as they were prior to admission or with assist devices provided by therapies during their stay:  Yes  Patient MUST void prior to discharge:  Yes    Patient able to tolerate oral intake:  Yes  Pain has adequate pain control using Oral analgesics:  Yes  Does patient have an identified :  Yes   Spouse  Has goal D/C date and time been discussed with patient:  Yes   Discharge to Home w/ Family 05/26/2023.     Taking Tramadol, Tylenol for pain, A&O x4. CMS Intact. VSS on RA. Up with Ax1 GB and walker. Voiding in BR. Continue to monitor.

## 2023-05-26 NOTE — PROGRESS NOTES
05/25/23 1800   Appointment Info   Signing Clinician's Name / Credentials (PT) Bertin Davis DPT   Rehab Comments (PT) (S)  50% WB RLE with FWW for 2 weeks, no hip precautions   Quick Adds   Quick Adds Certification   Living Environment   People in Home spouse   Current Living Arrangements house   Home Accessibility stairs to enter home;stairs within home   Number of Stairs, Main Entrance 2   Stair Railings, Main Entrance none   Number of Stairs, Within Home, Primary seven   Stair Railings, Within Home, Primary railing on right side (ascending)   Transportation Anticipated family or friend will provide   Living Environment Comments Pt lives with spouse in house who can provide good 24/7 assist, adult dtr avail for help as needed. 2 MORRO with 7 steps up to bedroom and bathroom   Self-Care   Usual Activity Tolerance excellent   Current Activity Tolerance moderate   Equipment Currently Used at Home none   Fall history within last six months no   Activity/Exercise/Self-Care Comment At baseline pt is very active and IND with all mobility and ADL's. Has walk-in or tub shower at home   General Information   Onset of Illness/Injury or Date of Surgery 05/25/23   Referring Physician Salome Kirby PA-C   Patient/Family Therapy Goals Statement (PT) go home   Pertinent History of Current Problem (include personal factors and/or comorbidities that impact the POC) Pt si a 72 y.o. female s/p right OSCAR with right abductor tendon repair on 5/25/2023, POD#0   Existing Precautions/Restrictions fall;weight bearing   Weight-Bearing Status - RLE partial weight-bearing (% in comments)  (50%)   Cognition   Affect/Mental Status (Cognition) WNL   Orientation Status (Cognition) oriented x 4   Follows Commands (Cognition) WNL   Pain Assessment   Patient Currently in Pain No   Integumentary/Edema   Integumentary/Edema Comments dressing over right hip appears CDI   Posture    Posture Not impaired   Range of Motion (ROM)   Range of  Motion ROM deficits secondary to surgical procedure;ROM deficits secondary to pain;ROM deficits secondary to swelling   ROM Comment deficits with right hip from post surgical discomfort, otherwise appears grossly WFL   Strength (Manual Muscle Testing)   Strength (Manual Muscle Testing) Able to perform R SLR;Able to perform L SLR;Deficits observed during functional mobility   Strength Comments not formally assessed, deficits with right hip after surgery, otherwise appears grossly antigravity   Bed Mobility   Comment, (Bed Mobility) supine<>sit SBA   Transfers   Comment, (Transfers) sit<>stand with FWW CGA   Gait/Stairs (Locomotion)   [place] Level (Gait) contact guard   Assistive Device (Gait) walker, front-wheeled   Distance in Feet 10'   Distance in Feet (Gait) 175'   Pattern (Gait) step-to   Deviations/Abnormal Patterns (Gait) right sided deviations;antalgic;brinda decreased;gait speed decreased;stride length decreased;weight shifting decreased   Maintains Weight-bearing Status (Gait) able to maintain;verbal cues to maintain   Balance   Balance Comments deficits with dynamic balance   Sensory Examination   Sensory Perception patient reports no sensory changes   Clinical Impression   Criteria for Skilled Therapeutic Intervention Yes, treatment indicated   PT Diagnosis (PT) impaired gait   Influenced by the following impairments pain, deficits with ROM, strength, balance   Functional limitations due to impairments decreased ind with transfers, amb, ADL's, need for AD at all times to maintain WB restrictions   Clinical Presentation (PT Evaluation Complexity) Stable/Uncomplicated   Clinical Presentation Rationale PMH and clinical judgement   Clinical Decision Making (Complexity) low complexity   Planned Therapy Interventions (PT) balance training;bed mobility training;cryotherapy;gait training;home exercise program;patient/family education;ROM (range of motion);stair training;strengthening;stretching;transfer  training;progressive activity/exercise   Anticipated Equipment Needs at Discharge (PT)   (pt will provide FWW, shower chair)   Risk & Benefits of therapy have been explained evaluation/treatment results reviewed;care plan/treatment goals reviewed;current/potential barriers reviewed;risks/benefits reviewed;participants voiced agreement with care plan;participants included;patient;spouse/significant other;daughter   PT Total Evaluation Time   PT Eval, Low Complexity Minutes (04463) 10   Plan of Care Review   Plan of Care Reviewed With patient;spouse;daughter   Therapy Certification   Start of care date 05/25/23   Certification date from 05/25/23   Certification date to 06/01/23   Medical Diagnosis Right OSCAR   Physical Therapy Goals   PT Frequency 2x/day   PT Predicted Duration/Target Date for Goal Attainment 05/26/23   PT Goals Bed Mobility;Transfers;Gait;Stairs   PT: Bed Mobility Modified independent;Supine to/from sit;Rolling;Bridging;Within precautions   PT: Transfers Modified independent;Sit to/from stand;Assistive device;Within precautions   PT: Gait Modified independent;Rolling walker;150 feet;Within precautions   PT: Stairs Minimal assist;Within precautions;7 stairs;Rail on right   Interventions   Interventions Quick Adds Gait Training;Therapeutic Activity;Therapeutic Procedure   Therapeutic Procedure/Exercise   Ther. Procedure: strength, endurance, ROM, flexibillity Minutes (97425) 1   Symptoms Noted During/After Treatment none   Treatment Detail/Skilled Intervention Educated on circulatory benefits of AP's after surgery and to perform throughout the day whenever resting. With pt in supine cued for AP's x 30 and pt tolerated well   Therapeutic Activity   Therapeutic Activities: dynamic activities to improve functional performance Minutes (71749) 15   Symptoms Noted During/After Treatment None   Treatment Detail/Skilled Intervention Greeted pt supine in bed with spouse and daughter present. Eval completed.  Educated on orders for weight bearing 50% and no hip precautions, pt verbalized understanding and was able to follow WB precaution well. Bed mobility supine<>sit SBA vcs for sequencing with pt performing well. With pt supine in bed IND with repositioning. Sit<>stand CGA with FWW cues for safe walker and hand placement, educated that when standing to not shift weight over onto RLE and to at least maintain even weight distribution. Stand<>sit from raised toilet seat with FWW SBA, cues for set up and sequencing. Note that pt was sating at 88-90% on RA upon arrival, after ambulating and back in bed pt was sating at 98% on RA   Gait Training   Gait Training Minutes (12868) 8   Symptoms Noted During/After Treatment (Gait Training) none   Treatment Detail/Skilled Intervention Pt amb with FWW CGA progressing to SBA. Therapist demonstrated a step-to pattern leading with the surgical leg while pushing down on walker with UE's to maintain 50% WB and pt was able to demo technique back very well, while appearing to maintain WB status throughout. Cues for safe walker placement and for picking up feet when turning. Very stable without and LOB   [place] Level (Gait Training) stand-by assist   Physical Assistance Level (Gait Training) supervision   Weight Bearing (Gait Training) partial weight-bearing  (50%)   Assistive Device (Gait Training) rolling walker   Pattern Analysis (Gait Training)   (step-to)   Gait Analysis Deviations decreased brinda;increased time in double stance;decreased velocity of limb motion;decreased step length;decreased stride length;decreased swing-to-stance ratio;decreased weight-shifting ability   Impairments (Gait Analysis/Training) strength decreased;balance impaired;other (see comments)  (WB restrictions)   PT Discharge Planning   PT Plan review 50% WB restriction, progress gait distance, trial steps, safe transfers   PT Discharge Recommendation (DC Rec)   (defer to ortho)   PT Rationale for DC Rec  Pt below baseline but moving well and using safe technique while maintaining WB status. Anticipate pt will progress and by discharge be at/near Mod-I bed mobility, Mod-I transfers with FWW, Mod-I amb with FWW, Keanu for steps. Pt will have good 24/7 support at home from spouse and dtr as needed   PT Brief overview of current status bed mobility SBA, STS with FWW SBA, amb with FWW SBA   Total Session Time   Timed Code Treatment Minutes 24   Total Session Time (sum of timed and untimed services) 34       [facility]  OUTPATIENT PHYSICAL THERAPY EVALUATION  PLAN OF TREATMENT FOR OUTPATIENT REHABILITATION  (COMPLETE FOR INITIAL CLAIMS ONLY)  Patient's Last Name, First Name, M.I.  YOB: 1950  Madonna Duque                        Provider's Name  [facility] Medical Record No.  [id number]                             Onset Date:  05/25/23   Start of Care Date:  05/25/23   Type:     _X_PT   ___OT   ___SLP Medical Diagnosis:  Right OSCAR              PT Diagnosis:  impaired gait Visits from SOC:  1     See note for plan of treatment, functional goals and certification details    I CERTIFY THE NEED FOR THESE SERVICES FURNISHED UNDER        THIS PLAN OF TREATMENT AND WHILE UNDER MY CARE     (Physician co-signature of this document indicates review and certification of the therapy plan).

## 2023-05-26 NOTE — PLAN OF CARE
Discharge Note    Patient discharged to home via private vehicle  accompanied by significant other  and daughter.  IV: Discontinued  Prescriptions filled and given to patient/family.   Belongings reviewed and sent with patient.   Home medications returned to patient: NA  Equipment sent with: N/A.   patient verbalizes understanding of discharge instructions. AVS given to patient.

## 2023-06-07 ENCOUNTER — TRANSFERRED RECORDS (OUTPATIENT)
Dept: HEALTH INFORMATION MANAGEMENT | Facility: CLINIC | Age: 73
End: 2023-06-07
Payer: COMMERCIAL

## 2023-07-05 ENCOUNTER — TRANSFERRED RECORDS (OUTPATIENT)
Dept: HEALTH INFORMATION MANAGEMENT | Facility: CLINIC | Age: 73
End: 2023-07-05
Payer: COMMERCIAL

## 2023-11-06 ENCOUNTER — PATIENT OUTREACH (OUTPATIENT)
Dept: GASTROENTEROLOGY | Facility: CLINIC | Age: 73
End: 2023-11-06
Payer: COMMERCIAL

## 2023-11-10 ENCOUNTER — IMMUNIZATION (OUTPATIENT)
Dept: NURSING | Facility: CLINIC | Age: 73
End: 2023-11-10
Payer: COMMERCIAL

## 2023-11-10 PROCEDURE — 90480 ADMN SARSCOV2 VAC 1/ONLY CMP: CPT

## 2023-11-10 PROCEDURE — 91320 SARSCV2 VAC 30MCG TRS-SUC IM: CPT

## 2023-11-14 ENCOUNTER — PATIENT OUTREACH (OUTPATIENT)
Dept: CARE COORDINATION | Facility: CLINIC | Age: 73
End: 2023-11-14
Payer: COMMERCIAL

## 2023-11-15 ENCOUNTER — HOSPITAL ENCOUNTER (OUTPATIENT)
Dept: MAMMOGRAPHY | Facility: CLINIC | Age: 73
Discharge: HOME | End: 2023-11-15
Attending: INTERNAL MEDICINE | Admitting: INTERNAL MEDICINE
Payer: COMMERCIAL

## 2023-11-15 DIAGNOSIS — Z12.31 VISIT FOR SCREENING MAMMOGRAM: ICD-10-CM

## 2023-11-15 PROCEDURE — 77067 SCR MAMMO BI INCL CAD: CPT

## 2023-12-14 ENCOUNTER — TRANSFERRED RECORDS (OUTPATIENT)
Dept: HEALTH INFORMATION MANAGEMENT | Facility: CLINIC | Age: 73
End: 2023-12-14
Payer: COMMERCIAL

## 2024-01-04 ENCOUNTER — E-VISIT (OUTPATIENT)
Dept: FAMILY MEDICINE | Facility: CLINIC | Age: 74
End: 2024-01-04
Payer: COMMERCIAL

## 2024-01-04 DIAGNOSIS — N30.90 CYSTITIS: Primary | ICD-10-CM

## 2024-01-04 DIAGNOSIS — N95.2 ATROPHIC VAGINITIS: ICD-10-CM

## 2024-01-04 PROCEDURE — 99422 OL DIG E/M SVC 11-20 MIN: CPT | Performed by: NURSE PRACTITIONER

## 2024-01-04 RX ORDER — SULFAMETHOXAZOLE/TRIMETHOPRIM 800-160 MG
1 TABLET ORAL 2 TIMES DAILY
Qty: 6 TABLET | Refills: 0 | Status: SHIPPED | OUTPATIENT
Start: 2024-01-04 | End: 2024-01-07

## 2024-01-04 RX ORDER — ESTRADIOL 0.1 MG/G
1-2 CREAM VAGINAL
Qty: 42.5 G | Refills: 4 | Status: SHIPPED | OUTPATIENT
Start: 2024-01-04

## 2024-01-04 NOTE — PATIENT INSTRUCTIONS
Dear Madonna Duque    After reviewing your responses, I've been able to diagnose you with a urinary tract infection, which is a common infection of the bladder with bacteria.  This is not a sexually transmitted infection, though urinating immediately after intercourse can help prevent infections.  Drinking lots of fluids is also helpful to clear your current infection and prevent the next one.      I have sent a prescription for antibiotics to your pharmacy to treat this infection.    It is important that you take all of your prescribed medication even if your symptoms are improving after a few doses.  Taking all of your medicine helps prevent the symptoms from returning.     Also, I have sent a prescription for vaginal estrogen cream. After menopause women can develop symptoms that feel like a UTI but are actually related to changes of vaginal tissue. The estrogen is very safe because it is not systemically absorbed. You can place a small amount on your finger and apply it just inside the vagina and on the inner labia from front to back. This can help prevent UTIs and can also help with incontinence. Let me know if you have any questions about it. I think it will be a great addition for you.     If your symptoms worsen, you develop pain in your back or stomach, develop fevers, or are not improving in 5 days, please contact your primary care provider for an appointment or visit any of our convenient Walk-in or Urgent Care Centers to be seen, which can be found on our website here.    Thanks again for choosing us as your health care partner,    AARON Higgins CNP

## 2024-01-19 ENCOUNTER — PATIENT OUTREACH (OUTPATIENT)
Dept: CARE COORDINATION | Facility: CLINIC | Age: 74
End: 2024-01-19

## 2024-01-19 ENCOUNTER — OFFICE VISIT (OUTPATIENT)
Dept: FAMILY MEDICINE | Facility: CLINIC | Age: 74
End: 2024-01-19
Payer: COMMERCIAL

## 2024-01-19 VITALS
WEIGHT: 154 LBS | RESPIRATION RATE: 16 BRPM | DIASTOLIC BLOOD PRESSURE: 83 MMHG | TEMPERATURE: 97.7 F | BODY MASS INDEX: 26.29 KG/M2 | SYSTOLIC BLOOD PRESSURE: 127 MMHG | HEIGHT: 64 IN | OXYGEN SATURATION: 100 % | HEART RATE: 63 BPM

## 2024-01-19 DIAGNOSIS — I25.10 CORONARY ARTERY DISEASE INVOLVING NATIVE CORONARY ARTERY OF NATIVE HEART WITHOUT ANGINA PECTORIS: ICD-10-CM

## 2024-01-19 DIAGNOSIS — M16.12 PRIMARY OSTEOARTHRITIS OF LEFT HIP: ICD-10-CM

## 2024-01-19 DIAGNOSIS — E78.00 HYPERCHOLESTEREMIA: ICD-10-CM

## 2024-01-19 DIAGNOSIS — Z01.818 PRE-OP EXAMINATION: Primary | ICD-10-CM

## 2024-01-19 DIAGNOSIS — I49.01 VENTRICULAR FIBRILLATION (H): ICD-10-CM

## 2024-01-19 DIAGNOSIS — D12.6 TUBULAR ADENOMA OF COLON: ICD-10-CM

## 2024-01-19 DIAGNOSIS — R73.9 ELEVATED BLOOD SUGAR: ICD-10-CM

## 2024-01-19 PROBLEM — M16.11 OSTEOARTHRITIS OF RIGHT HIP: Status: RESOLVED | Noted: 2023-05-25 | Resolved: 2024-01-19

## 2024-01-19 LAB
ERYTHROCYTE [DISTWIDTH] IN BLOOD BY AUTOMATED COUNT: 13.8 % (ref 10–15)
HBA1C MFR BLD: 6.1 % (ref 0–5.6)
HCT VFR BLD AUTO: 41.8 % (ref 35–47)
HGB BLD-MCNC: 13.5 G/DL (ref 11.7–15.7)
MCH RBC QN AUTO: 29.3 PG (ref 26.5–33)
MCHC RBC AUTO-ENTMCNC: 32.3 G/DL (ref 31.5–36.5)
MCV RBC AUTO: 91 FL (ref 78–100)
PLATELET # BLD AUTO: 236 10E3/UL (ref 150–450)
RBC # BLD AUTO: 4.6 10E6/UL (ref 3.8–5.2)
WBC # BLD AUTO: 6.1 10E3/UL (ref 4–11)

## 2024-01-19 PROCEDURE — 83036 HEMOGLOBIN GLYCOSYLATED A1C: CPT | Performed by: INTERNAL MEDICINE

## 2024-01-19 PROCEDURE — 85027 COMPLETE CBC AUTOMATED: CPT | Performed by: INTERNAL MEDICINE

## 2024-01-19 PROCEDURE — 99214 OFFICE O/P EST MOD 30 MIN: CPT | Performed by: INTERNAL MEDICINE

## 2024-01-19 PROCEDURE — 36415 COLL VENOUS BLD VENIPUNCTURE: CPT | Performed by: INTERNAL MEDICINE

## 2024-01-19 PROCEDURE — 80048 BASIC METABOLIC PNL TOTAL CA: CPT | Performed by: INTERNAL MEDICINE

## 2024-01-19 PROCEDURE — 80061 LIPID PANEL: CPT | Performed by: INTERNAL MEDICINE

## 2024-01-19 ASSESSMENT — PAIN SCALES - GENERAL: PAINLEVEL: NO PAIN (0)

## 2024-01-19 NOTE — PROGRESS NOTES
Preoperative Evaluation  [facility]  [address], [address]  [place] [address]  Phone: [phone]  Primary Provider: Jesse Tejada  Pre-op Performing Provider: JESSE TEJADA  Jan 19, 2024       Madonna is a 73 year old, presenting for the following:  No chief complaint on file.      Surgical Information  Surgery/Procedure: Left total hip  Surgery Location: [facility]  Surgeon: Dr. Danny Nick  Surgery Date: 01/26/2024  Time of Surgery: tbd  Where patient plans to recover: At home with family  Fax number for surgical facility: [phone]        Subjective       HPI related to upcoming procedure: This is a very pleasant 73-year-old scheduled for hip replacement surgery.  She had the other hip done in May and did quite well.    Patient has a history of cardiovascular disease as noted and reviewed.  In follow-up a year ago she did have a negative stress test although it suboptimal.  She has no chest pain or shortness of breath and no dizziness or blackouts.  She swims regularly.  She has no symptoms.  She had a recent UTI but her symptoms are resolved.    In terms of her cardiovascular history in 2015 she had a non-STEMI MI with V-fib arrest and stents placed x 2.  She was on a cruise and [employer] I at the time.  She has had no problems with cardiovascular disease since then.                Past Medical History:      Past Medical History:   Diagnosis Date    Abdominal discomfort 12/2013    ct abd and pelvis no cause found    Cancer (H) 2017    Basal cell left forarm    Chest pain 02/2014    neg est echo    Colonic polyp 2005    fu 2011 nl and to fu 2021    Coronary artery disease involving native coronary artery of native heart without angina pectoris 2015    NSTEMI/Vfib arrest/stents x2    Dizzy 2009    mri brain nl    DJD (degenerative joint disease)     neck    Elevated blood sugar     GERD (gastroesophageal reflux disease) 2007    egd, benign stricture    Hypercholesteremia 2010     crestor intolerant, lipitor intolerant    CORINA (iron deficiency anaemia) 2008    ugi and sbft nl, be nl    Left ventricular diastolic dysfunction 01/2016    echo done for fu and nl ef, no wma    Osteopenia     fu dexa 2011 -1.9 fem neck and -1.2 spine, stable c/w 2007    Osteopenia of both hips 10/2019    screening dexa    PONV (postoperative nausea and vomiting)     STEMI (ST elevation myocardial infarction) (H) 12/2015    in [place] on cruise, [employer]ytics then ptca of lad, 2 stents, had vfib arrest, ef on angio 67%, 40% rca, no other dz; fu est echo 12/16 nl    Syncope     episode August 2017, possible vasovagal    Tubular adenoma of colon 04/2021    fu 5 years    Urethral strictures     Ventricular fibrillation (H) 12/2015    during mi    Vitamin D deficiency              Past Surgical History:      Past Surgical History:   Procedure Laterality Date    APPENDECTOMY      appendectomy done at same time as oopherectomy    ARTHROPLASTY HIP Right 5/25/2023    Procedure: Right total hip arthroplasty;  Surgeon: Anseth, Scott Duane, MD;  Location:  OR    CARDIAC SURGERY  2015    2 stents LAD    COLONOSCOPY  04/28/2021    2 pre cancerous polyps    COSMETIC SURGERY  05/05/2021    Moes procedure, squamous cell nose    HC REMOVAL OF OVARY/TUBE(S)  80    Salpingo-Oophorectomy, Unilateral(endometriosis)    LAPAROTOMY EXPLORATORY  79    due to endomet, removed ovary    OTHER SURGICAL HISTORY  2015    stents x2 to LAD    REPAIR PTOSIS BILATERAL  2/7/2012    Procedure:REPAIR PTOSIS BILATERAL; BILATERAL UPPER LID MECHANICAL PTOSIS REPAIR,and ptosis repair; Surgeon:JOVANA BETANCUR; Location:[facility] LIGATE FALLOPIAN TUBE,POSTPARTUM  85    Tubal Ligation             Social History:     Social History     Tobacco Use    Smoking status: Former     Packs/day: 0.50     Years: 10.00     Additional pack years: 0.00     Total pack years: 5.00     Types: Cigarettes     Start date: 10/1/1973     Quit date: 1/28/1982     Years since  "quittin.0    Smokeless tobacco: Never    Tobacco comments:     quit 23 years ago   Substance Use Topics    Alcohol use: No     Alcohol/week: 0.0 standard drinks of alcohol             Family History:   No family history of bleeding difficulty, anesthesia problems or blood clots.         Allergies:     Allergies   Allergen Reactions    Codeine Nausea and Vomiting    Shrimp Nausea and Vomiting             Medications:     Current Outpatient Medications   Medication Sig Dispense Refill    aspirin (ASA) 81 MG EC tablet Take 1 tablet (81 mg) by mouth daily 90 tablet 3    carvedilol (COREG) 3.125 MG tablet Take 1 tablet (3.125 mg) by mouth 2 times daily (with meals) 180 tablet 3    Cholecalciferol (VITAMIN D3) 125 MCG (5000 UT) TBDP Take 1 tablet by mouth daily      co-enzyme Q-10 100 MG CAPS capsule Take 2 capsules by mouth daily      Cranberry 1000 MG CAPS Take 2 capsules by mouth daily      estradiol (ESTRACE) 0.1 MG/GM vaginal cream Place 1-2 g vaginally twice a week 42.5 g 4    nitroGLYcerin (NITROSTAT) 0.4 MG sublingual tablet Place 1 tablet (0.4 mg) under the tongue every 5 minutes as needed for chest pain If you are still having symptoms after 3 doses (15 minutes) call 911. 25 tablet 0    omeprazole (PRILOSEC) 20 MG DR capsule Take 20 mg by mouth 2 times daily       rosuvastatin (CRESTOR) 10 MG tablet Take 1 tablet (10 mg) by mouth daily 90 tablet 3    acetaminophen (TYLENOL) 325 MG tablet Take 2 tablets (650 mg) by mouth every 4 hours as needed for other (mild pain) 100 tablet 0               Review of Systems:     No history of bleeding difficulty, anesthesia problems or blood clots.  The 10 point Review of Systems is negative other than noted in the HPI           Physical Exam:   Blood pressure 127/83, pulse 63, temperature 97.7  F (36.5  C), temperature source Temporal, resp. rate 16, height 1.626 m (5' 4\"), weight 69.9 kg (154 lb), SpO2 100%, not currently breastfeeding.    Constitutional: healthy " appearing, alert and in no distress  Heent: Normocephalic. Head without obvious masses or lesions. PERRLDC, EOMI. Mouth exam within normal limits: tongue, mucous membranes, posterior pharynx all normal, no lesions or abnormalities seen. Neck supple, no nuchal rigidity or masses. No supraclavicular, or cervical adenopathy. Thyroid symmetric, no masses.  Cardiovascular: Regular rate and rhythm, no murmer, rub or gallops.  JVP not elevated, no edema.  Carotids within normal limits bilaterally, no bruits.  Respiratory: Normal respiratory effort.  Lungs clear, normal flow, no wheezing or crackles.  Gastrointestinal: Normal active bowel sounds.   Soft, not tender, no masses, guarding or rebound.  No hepatosplenomegaly.   Musculoskeletal: extremities normal, no gross deformities noted.  Skin: no suspicious lesions or rashes   Neurologic: Mental status within normal limits.  Speech fluent.  No gross motor abnormalities and gait intact.  Psychiatric: mentation appears normal and affect normal.         Data:   Recent EKG 5/2023 within normal limits  Labs sent        Assessment:   Normal preoperative examination for hip replacement.  This patient should be low risk for the procedure  Coronary artery disease, stable,  med management  V-fib, no issues  Colon polyp, up-to-date on follow-up  Elevated cholesterol, on low-dose statin therapy.  Does not tolerate higher dose or other meds         Plan:   The patient is okay for the anticipated procedure  She will hold her aspirin a week before  She should take her other medications on the day of surgery with a sip of water  Routine postop pulmonary toilet and DVT prophylaxis  Resume aspirin postop when okay with surgery  RSV shot at pharmacy      Jeses Tejada M.D.

## 2024-01-20 LAB
ANION GAP SERPL CALCULATED.3IONS-SCNC: 12 MMOL/L (ref 7–15)
BUN SERPL-MCNC: 16.3 MG/DL (ref 8–23)
CALCIUM SERPL-MCNC: 9.6 MG/DL (ref 8.8–10.2)
CHLORIDE SERPL-SCNC: 102 MMOL/L (ref 98–107)
CHOLEST SERPL-MCNC: 175 MG/DL
CREAT SERPL-MCNC: 0.67 MG/DL (ref 0.51–0.95)
DEPRECATED HCO3 PLAS-SCNC: 25 MMOL/L (ref 22–29)
EGFRCR SERPLBLD CKD-EPI 2021: >90 ML/MIN/1.73M2
FASTING STATUS PATIENT QL REPORTED: YES
GLUCOSE SERPL-MCNC: 116 MG/DL (ref 70–99)
HDLC SERPL-MCNC: 52 MG/DL
LDLC SERPL CALC-MCNC: 95 MG/DL
NONHDLC SERPL-MCNC: 123 MG/DL
POTASSIUM SERPL-SCNC: 4.3 MMOL/L (ref 3.4–5.3)
SODIUM SERPL-SCNC: 139 MMOL/L (ref 135–145)
TRIGL SERPL-MCNC: 138 MG/DL

## 2024-01-20 NOTE — RESULT ENCOUNTER NOTE
It was nice to see you.  Your should be able to view the lab results.    Overall the test look good including your CBC and cholesterol panel.  Your sugar however is high as is the A1c which it has been before.  Please be sure to exercise and keep your weight down 200 is let me know if you have questions.    Jesse Tejada M.D.

## 2024-01-24 ENCOUNTER — TRANSFERRED RECORDS (OUTPATIENT)
Dept: HEALTH INFORMATION MANAGEMENT | Facility: CLINIC | Age: 74
End: 2024-01-24
Payer: COMMERCIAL

## 2024-02-08 ENCOUNTER — TRANSFERRED RECORDS (OUTPATIENT)
Dept: HEALTH INFORMATION MANAGEMENT | Facility: CLINIC | Age: 74
End: 2024-02-08
Payer: COMMERCIAL

## 2024-02-12 NOTE — PROGRESS NOTES
CARDIOLOGY CLINIC VISIT  DATE OF SERVICE:  February 13, 2024      PRIMARY CARE PHYSICIAN:  Jesse Tejada    HISTORY OF PRESENT ILLNESS:  Ms. Madonna Duque  is 73 years old and is followed for history of coronary disease, prior coronary intervention and prior myocardial infarct who presents to clinic today for follow up.  She was last seen by me in January 2023     In 2015, she was in [place].  She experienced an acute anterior ST elevation myocardial infarct complicated by ventricular fibrillatory arrest.  She was evacuated by air and eventually underwent coronary angiography which resulted in placement of 2 stents in the LAD.  She has done well since that time without recurrent symptoms.      She has dyslipidemia but developed severe myalgias with 40 mg of rosuvastatin.  She then was in a cholesterol therapy study at [facility] run by Dr. Surjit Perez that was discontinued, then was on therapy with Praluent.  She developed leg aching and weakness with Praluent and it necessitated the discontinuation of the medication though her LDL had fallen to 81 mg/dl. She has been significantly intolerant of statins due to severe myalgias with Zetia, rosuvastatin, atorvastatin and simvastatin.  With her last visit, rosuvastatin 5 mg daily was started with concurrent use of coenzyme Q 10 and her LDL has returned at 91 mg/dL (HDL 58 mg/dL liter) as measured last week.     Today, Madonna reports feeling very well.  She is active and denies any exertional chest pain, chest discomfort or shortness of breath . She denies any heart palpitations.  She continues on crestor 10 mg daily which she has been tolerating without difficulty.      PAST MEDICAL HISTORY:  Past Medical History:   Diagnosis Date    Abdominal discomfort 12/2013    ct abd and pelvis no cause found    Cancer (H) 2017    Basal cell left forarm    Chest pain 02/2014    neg est echo    Colonic polyp 2005 fu 2011 nl and to fu 2021    Coronary artery disease involving  native coronary artery of native heart without angina pectoris 2015    NSTEMI/Vfib arrest/stents x2    Dizzy 2009    mri brain nl    DJD (degenerative joint disease)     neck    Elevated blood sugar     GERD (gastroesophageal reflux disease) 2007    egd, benign stricture    Hypercholesteremia 2010    crestor intolerant, lipitor intolerant    CORINA (iron deficiency anaemia) 2008    ugi and sbft nl, be nl    Left ventricular diastolic dysfunction 01/2016    echo done for fu and nl ef, no wma    Osteopenia     fu dexa 2011 -1.9 fem neck and -1.2 spine, stable c/w 2007    Osteopenia of both hips 10/2019    screening dexa    PONV (postoperative nausea and vomiting)     STEMI (ST elevation myocardial infarction) (H) 12/2015    in [place] on cruise, lytics then ptca of lad, 2 stents, had vfib arrest, ef on angio 67%, 40% rca, no other dz; fu est echo 12/16 nl    Syncope     episode August 2017, possible vasovagal    Tubular adenoma of colon 04/2021    fu 5 years    Urethral strictures     Ventricular fibrillation (H) 12/2015    during mi    Vitamin D deficiency        MEDICATIONS:  Current Outpatient Medications   Medication    acetaminophen (TYLENOL) 325 MG tablet    aspirin (ASA) 81 MG EC tablet    carvedilol (COREG) 3.125 MG tablet    Cholecalciferol (VITAMIN D3) 125 MCG (5000 UT) TBDP    co-enzyme Q-10 100 MG CAPS capsule    Cranberry 1000 MG CAPS    estradiol (ESTRACE) 0.1 MG/GM vaginal cream    nitroGLYcerin (NITROSTAT) 0.4 MG sublingual tablet    omeprazole (PRILOSEC) 20 MG DR capsule    rosuvastatin (CRESTOR) 10 MG tablet     No current facility-administered medications for this visit.       ALLERGIES:  Allergies   Allergen Reactions    Codeine Nausea and Vomiting    Shrimp Nausea and Vomiting         REVIEW OF SYSTEMS:  A complete ROS was obtained and the pertinent positives are outlined in the history of present illness above.  The remainder of systems is negative.      PHYSICAL EXAM:                     Vital Signs  with Ranges     0 lbs 0 oz    Constitutional: awake, alert, no distress  Eyes: PERRL, sclera nonicteric  ENT: trachea midline  Respiratory: CTAB  Cardiovascular: RRR no m/r/g, no JVD  GI: nondistended, nontender, bowel sounds present  Lymph/Hematologic: no lymphadenopathy  Skin: dry, no rash  Musculoskeletal: good muscle tone, no edema bilaterally  Neurologic: no focal deficits  Neuropsychiatric: appropriate affact    DATA:  Labs:   Dated 1//27/24 reviewed personally, BMP and Hgb,  labs dated 1/19/24 Lipid panel, Hgb A1c    ASSESSMENT:  1.  Coronary artery disease:  S/P anterior STEMI complicated by VF arrest; 2 KISHA to LAD in 2015.  Stress echocardiogram in 2021 was normal.  Stable without symptoms of concern.    2.  Hyperlipidemia:  .  Difficulty with statins in the past and did not tolerate praluent, but currently tolerating rosuvastatin 10 mg daily with CoQ10.  She has participated in a number of studies through Dr. Perez' lipid clinic.  She has been on zetia in the past, which did not change her LDL and it was ultimatel stopped.        RECOMMENDATIONS:  -Continue ASA, crestor, carvedilol.  -Continue efforts in eating a heart healthy diet and regular exercise  -Follow up in 12 months or before than as needed    Delmy Mccurdy MD [facility]  February 13, 2024      I spent a total of 30 providing patient care.  This time spent includes chart review, time spent with the patient during the clinic visit and time spent afterwards providing necessary documentation.

## 2024-02-13 ENCOUNTER — OFFICE VISIT (OUTPATIENT)
Dept: CARDIOLOGY | Facility: CLINIC | Age: 74
End: 2024-02-13
Attending: SPECIALIST/TECHNOLOGIST
Payer: COMMERCIAL

## 2024-02-13 VITALS
HEART RATE: 64 BPM | SYSTOLIC BLOOD PRESSURE: 124 MMHG | BODY MASS INDEX: 27.43 KG/M2 | OXYGEN SATURATION: 98 % | DIASTOLIC BLOOD PRESSURE: 82 MMHG | WEIGHT: 160.7 LBS | HEIGHT: 64 IN

## 2024-02-13 DIAGNOSIS — I25.10 CORONARY ARTERY DISEASE INVOLVING NATIVE CORONARY ARTERY OF NATIVE HEART WITHOUT ANGINA PECTORIS: Primary | ICD-10-CM

## 2024-02-13 PROCEDURE — 99214 OFFICE O/P EST MOD 30 MIN: CPT | Performed by: INTERNAL MEDICINE

## 2024-02-13 RX ORDER — CELECOXIB 100 MG/1
1 CAPSULE ORAL 2 TIMES DAILY
COMMUNITY
Start: 2024-01-26

## 2024-02-13 NOTE — LETTER
2/13/2024    Jesse Tejada MD  [address] [facility] [address]  [place] [address]    RE: Madonna Duque       Dear Colleague,     I had the pleasure of seeing Madonna Duque in the [facility].  CARDIOLOGY CLINIC VISIT  DATE OF SERVICE:  February 13, 2024      PRIMARY CARE PHYSICIAN:  Jesse Tejada    HISTORY OF PRESENT ILLNESS:  Ms. Madonna Duque  is 73 years old and is followed for history of coronary disease, prior coronary intervention and prior myocardial infarct who presents to clinic today for follow up.  She was last seen by me in January 2023     In 2015, she was in [place].  She experienced an acute anterior ST elevation myocardial infarct complicated by ventricular fibrillatory arrest.  She was evacuated by air and eventually underwent coronary angiography which resulted in placement of 2 stents in the LAD.  She has done well since that time without recurrent symptoms.      She has dyslipidemia but developed severe myalgias with 40 mg of rosuvastatin.  She then was in a cholesterol therapy study at [facility] run by Dr. Surjit Perez that was discontinued, then was on therapy with Praluent.  She developed leg aching and weakness with Praluent and it necessitated the discontinuation of the medication though her LDL had fallen to 81 mg/dl. She has been significantly intolerant of statins due to severe myalgias with Zetia, rosuvastatin, atorvastatin and simvastatin.  With her last visit, rosuvastatin 5 mg daily was started with concurrent use of coenzyme Q 10 and her LDL has returned at 91 mg/dL (HDL 58 mg/dL liter) as measured last week.     Today, Madonna reports feeling very well.  She is active and denies any exertional chest pain, chest discomfort or shortness of breath . She denies any heart palpitations.  She continues on crestor 10 mg daily which she has been tolerating without difficulty.      PAST MEDICAL HISTORY:  Past Medical History:   Diagnosis Date    Abdominal discomfort  12/2013    ct abd and pelvis no cause found    Cancer (H) 2017    Basal cell left forarm    Chest pain 02/2014    neg est echo    Colonic polyp 2005    fu 2011 nl and to fu 2021    Coronary artery disease involving native coronary artery of native heart without angina pectoris 2015    NSTEMI/Vfib arrest/stents x2    Dizzy 2009    mri brain nl    DJD (degenerative joint disease)     neck    Elevated blood sugar     GERD (gastroesophageal reflux disease) 2007    egd, benign stricture    Hypercholesteremia 2010    crestor intolerant, lipitor intolerant    CORINA (iron deficiency anaemia) 2008    ugi and sbft nl, be nl    Left ventricular diastolic dysfunction 01/2016    echo done for fu and nl ef, no wma    Osteopenia     fu dexa 2011 -1.9 fem neck and -1.2 spine, stable c/w 2007    Osteopenia of both hips 10/2019    screening dexa    PONV (postoperative nausea and vomiting)     STEMI (ST elevation myocardial infarction) (H) 12/2015    in [place] on cruise, lytics then ptca of lad, 2 stents, had vfib arrest, ef on angio 67%, 40% rca, no other dz; fu est echo 12/16 nl    Syncope     episode August 2017, possible vasovagal    Tubular adenoma of colon 04/2021    fu 5 years    Urethral strictures     Ventricular fibrillation (H) 12/2015    during mi    Vitamin D deficiency        MEDICATIONS:  Current Outpatient Medications   Medication    acetaminophen (TYLENOL) 325 MG tablet    aspirin (ASA) 81 MG EC tablet    carvedilol (COREG) 3.125 MG tablet    Cholecalciferol (VITAMIN D3) 125 MCG (5000 UT) TBDP    co-enzyme Q-10 100 MG CAPS capsule    Cranberry 1000 MG CAPS    estradiol (ESTRACE) 0.1 MG/GM vaginal cream    nitroGLYcerin (NITROSTAT) 0.4 MG sublingual tablet    omeprazole (PRILOSEC) 20 MG DR capsule    rosuvastatin (CRESTOR) 10 MG tablet     No current facility-administered medications for this visit.       ALLERGIES:  Allergies   Allergen Reactions    Codeine Nausea and Vomiting    Shrimp Nausea and Vomiting          REVIEW OF SYSTEMS:  A complete ROS was obtained and the pertinent positives are outlined in the history of present illness above.  The remainder of systems is negative.      PHYSICAL EXAM:                     Vital Signs with Ranges     0 lbs 0 oz    Constitutional: awake, alert, no distress  Eyes: PERRL, sclera nonicteric  ENT: trachea midline  Respiratory: CTAB  Cardiovascular: RRR no m/r/g, no JVD  GI: nondistended, nontender, bowel sounds present  Lymph/Hematologic: no lymphadenopathy  Skin: dry, no rash  Musculoskeletal: good muscle tone, no edema bilaterally  Neurologic: no focal deficits  Neuropsychiatric: appropriate affact    DATA:  Labs:   Dated 1//27/24 reviewed personally, BMP and Hgb,  labs dated 1/19/24 Lipid panel, Hgb A1c    ASSESSMENT:  1.  Coronary artery disease:  S/P anterior STEMI complicated by VF arrest; 2 KISHA to LAD in 2015.  Stress echocardiogram in 2021 was normal.  Stable without symptoms of concern.    2.  Hyperlipidemia:  .  Difficulty with statins in the past and did not tolerate praluent, but currently tolerating rosuvastatin 10 mg daily with CoQ10.  She has participated in a number of studies through Dr. Perez' lipid clinic.  She has been on zetia in the past, which did not change her LDL and it was ultimatel stopped.        RECOMMENDATIONS:  -Continue ASA, crestor, carvedilol.  -Continue efforts in eating a heart healthy diet and regular exercise  -Follow up in 12 months or before than as needed    Delmy Mccurdy MD [facility]  February 13, 2024      I spent a total of 30 providing patient care.  This time spent includes chart review, time spent with the patient during the clinic visit and time spent afterwards providing necessary documentation.         Thank you for allowing me to participate in the care of your patient.      Sincerely,     Delmy Mccurdy MD     [facility]  cc:   Salome  Salud Kirby PA-C  [facility]  [address] [place] [address]

## 2024-02-13 NOTE — PATIENT INSTRUCTIONS
-Continue current medications; no changes made today  -Plan for follow up in 12 months with Dr. Mccurdy

## 2024-03-05 DIAGNOSIS — E78.00 HYPERCHOLESTEREMIA: ICD-10-CM

## 2024-03-05 RX ORDER — ROSUVASTATIN CALCIUM 10 MG/1
10 TABLET, COATED ORAL DAILY
Qty: 90 TABLET | Refills: 3 | Status: SHIPPED | OUTPATIENT
Start: 2024-03-05

## 2024-03-07 ENCOUNTER — TRANSFERRED RECORDS (OUTPATIENT)
Dept: HEALTH INFORMATION MANAGEMENT | Facility: CLINIC | Age: 74
End: 2024-03-07
Payer: COMMERCIAL

## 2024-03-24 ENCOUNTER — HEALTH MAINTENANCE LETTER (OUTPATIENT)
Age: 74
End: 2024-03-24

## 2024-03-24 DIAGNOSIS — E78.2 MIXED HYPERLIPIDEMIA: ICD-10-CM

## 2024-03-24 DIAGNOSIS — I25.10 CORONARY ARTERY DISEASE INVOLVING NATIVE HEART WITHOUT ANGINA PECTORIS, UNSPECIFIED VESSEL OR LESION TYPE: ICD-10-CM

## 2024-03-25 RX ORDER — CARVEDILOL 3.12 MG/1
3.12 TABLET ORAL 2 TIMES DAILY WITH MEALS
Qty: 180 TABLET | Refills: 1 | Status: SHIPPED | OUTPATIENT
Start: 2024-03-25 | End: 2024-09-23

## 2024-08-04 ENCOUNTER — E-VISIT (OUTPATIENT)
Dept: URGENT CARE | Facility: CLINIC | Age: 74
End: 2024-08-04
Payer: COMMERCIAL

## 2024-08-04 DIAGNOSIS — N39.0 ACUTE UTI (URINARY TRACT INFECTION): Primary | ICD-10-CM

## 2024-08-04 PROCEDURE — 99421 OL DIG E/M SVC 5-10 MIN: CPT | Performed by: PHYSICIAN ASSISTANT

## 2024-08-04 RX ORDER — SULFAMETHOXAZOLE/TRIMETHOPRIM 800-160 MG
1 TABLET ORAL 2 TIMES DAILY
Qty: 6 TABLET | Refills: 0 | Status: SHIPPED | OUTPATIENT
Start: 2024-08-04 | End: 2024-08-07

## 2024-08-04 NOTE — TELEPHONE ENCOUNTER
Provider E-Visit time total (minutes): 3       normal , pleasant, well nourished, in no acute distress

## 2024-08-04 NOTE — PATIENT INSTRUCTIONS
Dear Madonna Duque    After reviewing your responses, I've been able to diagnose you with a urinary tract infection, which is a common infection of the bladder with bacteria.  This is not a sexually transmitted infection, though urinating immediately after intercourse can help prevent infections.  Drinking lots of fluids is also helpful to clear your current infection and prevent the next one.      I have sent a prescription for antibiotics to your pharmacy to treat this infection.    It is important that you take all of your prescribed medication even if your symptoms are improving after a few doses.  Taking all of your medicine helps prevent the symptoms from returning.     If your symptoms worsen, you develop pain in your back or stomach, develop fevers, or are not improving in 5 days, please contact your primary care provider for an appointment or visit any of our convenient Walk-in or Urgent Care Centers to be seen, which can be found on our website here.    Thanks again for choosing us as your health care partner,    Emil Catherine PA-C

## 2024-08-08 ENCOUNTER — PATIENT OUTREACH (OUTPATIENT)
Dept: CARE COORDINATION | Facility: CLINIC | Age: 74
End: 2024-08-08
Payer: COMMERCIAL

## 2024-09-23 DIAGNOSIS — I25.10 CORONARY ARTERY DISEASE INVOLVING NATIVE HEART WITHOUT ANGINA PECTORIS, UNSPECIFIED VESSEL OR LESION TYPE: ICD-10-CM

## 2024-09-23 DIAGNOSIS — E78.2 MIXED HYPERLIPIDEMIA: ICD-10-CM

## 2024-09-23 RX ORDER — CARVEDILOL 3.12 MG/1
3.12 TABLET ORAL 2 TIMES DAILY WITH MEALS
Qty: 180 TABLET | Refills: 0 | Status: SHIPPED | OUTPATIENT
Start: 2024-09-23

## 2024-10-15 ENCOUNTER — IMMUNIZATION (OUTPATIENT)
Dept: FAMILY MEDICINE | Facility: CLINIC | Age: 74
End: 2024-10-15
Payer: COMMERCIAL

## 2024-10-15 VITALS — TEMPERATURE: 97.6 F

## 2024-10-15 DIAGNOSIS — Z23 HIGH PRIORITY FOR 2019-NCOV VACCINE: ICD-10-CM

## 2024-10-15 DIAGNOSIS — Z23 NEED FOR PROPHYLACTIC VACCINATION AND INOCULATION AGAINST INFLUENZA: Primary | ICD-10-CM

## 2024-10-15 PROCEDURE — 90480 ADMN SARSCOV2 VAC 1/ONLY CMP: CPT

## 2024-10-15 PROCEDURE — 91320 SARSCV2 VAC 30MCG TRS-SUC IM: CPT

## 2024-10-15 PROCEDURE — G0008 ADMIN INFLUENZA VIRUS VAC: HCPCS

## 2024-10-15 PROCEDURE — 90662 IIV NO PRSV INCREASED AG IM: CPT

## 2025-01-08 DIAGNOSIS — E78.2 MIXED HYPERLIPIDEMIA: ICD-10-CM

## 2025-01-08 DIAGNOSIS — I25.10 CORONARY ARTERY DISEASE INVOLVING NATIVE HEART WITHOUT ANGINA PECTORIS, UNSPECIFIED VESSEL OR LESION TYPE: ICD-10-CM

## 2025-01-09 RX ORDER — CARVEDILOL 3.12 MG/1
3.12 TABLET ORAL 2 TIMES DAILY WITH MEALS
Qty: 180 TABLET | Refills: 0 | Status: SHIPPED | OUTPATIENT
Start: 2025-01-09

## 2025-01-25 SDOH — HEALTH STABILITY: PHYSICAL HEALTH: ON AVERAGE, HOW MANY DAYS PER WEEK DO YOU ENGAGE IN MODERATE TO STRENUOUS EXERCISE (LIKE A BRISK WALK)?: 5 DAYS

## 2025-01-25 SDOH — HEALTH STABILITY: PHYSICAL HEALTH: ON AVERAGE, HOW MANY MINUTES DO YOU ENGAGE IN EXERCISE AT THIS LEVEL?: 60 MIN

## 2025-01-25 ASSESSMENT — SOCIAL DETERMINANTS OF HEALTH (SDOH): HOW OFTEN DO YOU GET TOGETHER WITH FRIENDS OR RELATIVES?: THREE TIMES A WEEK

## 2025-01-30 ENCOUNTER — TELEPHONE (OUTPATIENT)
Dept: FAMILY MEDICINE | Facility: CLINIC | Age: 75
End: 2025-01-30

## 2025-01-30 ENCOUNTER — OFFICE VISIT (OUTPATIENT)
Dept: FAMILY MEDICINE | Facility: CLINIC | Age: 75
End: 2025-01-30
Payer: COMMERCIAL

## 2025-01-30 VITALS
HEART RATE: 60 BPM | TEMPERATURE: 97.1 F | OXYGEN SATURATION: 99 % | HEIGHT: 64 IN | DIASTOLIC BLOOD PRESSURE: 72 MMHG | WEIGHT: 154 LBS | SYSTOLIC BLOOD PRESSURE: 127 MMHG | RESPIRATION RATE: 16 BRPM | BODY MASS INDEX: 26.29 KG/M2

## 2025-01-30 DIAGNOSIS — I25.10 CORONARY ARTERY DISEASE INVOLVING NATIVE HEART WITHOUT ANGINA PECTORIS, UNSPECIFIED VESSEL OR LESION TYPE: ICD-10-CM

## 2025-01-30 DIAGNOSIS — D12.6 TUBULAR ADENOMA OF COLON: ICD-10-CM

## 2025-01-30 DIAGNOSIS — E55.9 VITAMIN D DEFICIENCY: ICD-10-CM

## 2025-01-30 DIAGNOSIS — E78.2 MIXED HYPERLIPIDEMIA: ICD-10-CM

## 2025-01-30 DIAGNOSIS — M85.852 OSTEOPENIA OF BOTH HIPS: ICD-10-CM

## 2025-01-30 DIAGNOSIS — R73.9 ELEVATED BLOOD SUGAR: ICD-10-CM

## 2025-01-30 DIAGNOSIS — K21.9 GASTROESOPHAGEAL REFLUX DISEASE WITHOUT ESOPHAGITIS: ICD-10-CM

## 2025-01-30 DIAGNOSIS — E89.40 ASYMPTOMATIC POSTPROCEDURAL OVARIAN FAILURE: ICD-10-CM

## 2025-01-30 DIAGNOSIS — E78.00 HYPERCHOLESTEREMIA: ICD-10-CM

## 2025-01-30 DIAGNOSIS — I25.10 CORONARY ARTERY DISEASE INVOLVING NATIVE CORONARY ARTERY OF NATIVE HEART WITHOUT ANGINA PECTORIS: ICD-10-CM

## 2025-01-30 DIAGNOSIS — M85.851 OSTEOPENIA OF BOTH HIPS: ICD-10-CM

## 2025-01-30 DIAGNOSIS — Z00.00 ENCOUNTER FOR MEDICARE ANNUAL WELLNESS EXAM: Primary | ICD-10-CM

## 2025-01-30 LAB
ALBUMIN SERPL BCG-MCNC: 4.2 G/DL (ref 3.5–5.2)
ALP SERPL-CCNC: 100 U/L (ref 40–150)
ALT SERPL W P-5'-P-CCNC: 22 U/L (ref 0–50)
ANION GAP SERPL CALCULATED.3IONS-SCNC: 9 MMOL/L (ref 7–15)
AST SERPL W P-5'-P-CCNC: 28 U/L (ref 0–45)
BILIRUB SERPL-MCNC: 0.4 MG/DL
BUN SERPL-MCNC: 18.3 MG/DL (ref 8–23)
CALCIUM SERPL-MCNC: 9.8 MG/DL (ref 8.8–10.4)
CHLORIDE SERPL-SCNC: 107 MMOL/L (ref 98–107)
CHOLEST SERPL-MCNC: 188 MG/DL
CREAT SERPL-MCNC: 0.7 MG/DL (ref 0.51–0.95)
EGFRCR SERPLBLD CKD-EPI 2021: 90 ML/MIN/1.73M2
ERYTHROCYTE [DISTWIDTH] IN BLOOD BY AUTOMATED COUNT: 13.6 % (ref 10–15)
EST. AVERAGE GLUCOSE BLD GHB EST-MCNC: 131 MG/DL
FASTING STATUS PATIENT QL REPORTED: YES
FASTING STATUS PATIENT QL REPORTED: YES
GLUCOSE SERPL-MCNC: 125 MG/DL (ref 70–99)
HBA1C MFR BLD: 6.2 % (ref 0–5.6)
HCO3 SERPL-SCNC: 26 MMOL/L (ref 22–29)
HCT VFR BLD AUTO: 40.5 % (ref 35–47)
HDLC SERPL-MCNC: 57 MG/DL
HGB BLD-MCNC: 13.1 G/DL (ref 11.7–15.7)
LDLC SERPL CALC-MCNC: 105 MG/DL
MCH RBC QN AUTO: 28.7 PG (ref 26.5–33)
MCHC RBC AUTO-ENTMCNC: 32.3 G/DL (ref 31.5–36.5)
MCV RBC AUTO: 89 FL (ref 78–100)
NONHDLC SERPL-MCNC: 131 MG/DL
PLATELET # BLD AUTO: 215 10E3/UL (ref 150–450)
POTASSIUM SERPL-SCNC: 4.4 MMOL/L (ref 3.4–5.3)
PROT SERPL-MCNC: 6.8 G/DL (ref 6.4–8.3)
RBC # BLD AUTO: 4.56 10E6/UL (ref 3.8–5.2)
SODIUM SERPL-SCNC: 142 MMOL/L (ref 135–145)
TRIGL SERPL-MCNC: 128 MG/DL
VIT D+METAB SERPL-MCNC: 53 NG/ML (ref 20–50)
WBC # BLD AUTO: 5.4 10E3/UL (ref 4–11)

## 2025-01-30 RX ORDER — CARVEDILOL 3.12 MG/1
3.12 TABLET ORAL 2 TIMES DAILY WITH MEALS
Qty: 180 TABLET | Refills: 3 | Status: SHIPPED | OUTPATIENT
Start: 2025-01-30

## 2025-01-30 RX ORDER — ROSUVASTATIN CALCIUM 10 MG/1
10 TABLET, COATED ORAL DAILY
Qty: 90 TABLET | Refills: 3 | Status: SHIPPED | OUTPATIENT
Start: 2025-01-30

## 2025-01-30 ASSESSMENT — PAIN SCALES - GENERAL: PAINLEVEL_OUTOF10: NO PAIN (0)

## 2025-01-30 NOTE — TELEPHONE ENCOUNTER
Prior Authorization Retail Medication Request    Medication/Dose: Omeprazole 20mg requires prior authorization for twice daily dosing  Diagnosis and ICD code (if different than what is on RX):    New/renewal/insurance change PA/secondary ins. PA:  Previously Tried and Failed:    Rationale:      Insurance   Primary: [employer]  Insurance ID:  [id number]  Phone [phone]    Secondary (if applicable):  Insurance ID:      Pharmacy Information (if different than what is on RX)  Name:  [facility]  Phone:  [phone]  Fax:[phone]    Clinic Information  Preferred routing pool for dept communication:

## 2025-01-30 NOTE — PROGRESS NOTES
Preventive Care Visit  [facility] KANU Tejada MD, Internal Medicine  Jan 30, 2025          Edna Peacock is a 74 year old, presenting for the following:    She is doing very well and works out regularly.  She has no complaints.    She is  and has 4 grandkids and great grandchild on the way.               Past Medical History:      Past Medical History:   Diagnosis Date    Abdominal discomfort 12/2013    ct abd and pelvis no cause found    Cancer (H) 2017    Basal cell left forarm    Chest pain 02/2014    neg est echo    Colonic polyp 2005    fu 2011 nl and to fu 2021    Coronary artery disease involving native coronary artery of native heart without angina pectoris 2015    NSTEMI/Vfib arrest/stents x2    Dizzy 2009    mri brain nl    DJD (degenerative joint disease)     neck    Elevated blood sugar     GERD (gastroesophageal reflux disease) 2007    egd, benign stricture    Hypercholesteremia 2010    crestor intolerant, lipitor intolerant    CORINA (iron deficiency anaemia) 2008    ugi and sbft nl, be nl    Left ventricular diastolic dysfunction 01/2016    echo done for fu and nl ef, no wma    Osteopenia     fu dexa 2011 -1.9 fem neck and -1.2 spine, stable c/w 2007    Osteopenia of both hips 10/2019    screening dexa    PONV (postoperative nausea and vomiting)     STEMI (ST elevation myocardial infarction) (H) 12/2015    in [place] on cruise, lytics then ptca of lad, 2 stents, had vfib arrest, ef on angio 67%, 40% rca, no other dz; fu est echo 12/16 nl    Syncope     episode August 2017, possible vasovagal    Tubular adenoma of colon 04/2021    fu 5 years    Urethral strictures     Ventricular fibrillation (H) 12/2015    during mi    Vitamin D deficiency              Past Surgical History:      Past Surgical History:   Procedure Laterality Date    APPENDECTOMY      appendectomy done at same time as oopherectomy    ARTHROPLASTY HIP Right 05/25/2023    Procedure: Right total hip  arthroplasty;  Surgeon: Anseth, Scott Duane, MD;  Location: [facility]    ARTHROPLASTY HIP Left 2024    CARDIAC SURGERY      2 stents LAD    COLONOSCOPY  2021    2 pre cancerous polyps    COSMETIC SURGERY  2021    Moes procedure, squamous cell nose    HC REMOVAL OF OVARY/TUBE(S)      Salpingo-Oophorectomy, Unilateral(endometriosis)    LAPAROTOMY EXPLORATORY      due to endomet, removed ovary    OTHER SURGICAL HISTORY  2015    stents x2 to LAD    REPAIR PTOSIS BILATERAL  2012    Procedure:REPAIR PTOSIS BILATERAL; BILATERAL UPPER LID MECHANICAL PTOSIS REPAIR,and ptosis repair; Surgeon:JOVANA BETANCUR; Location: [facility]    ZZC LIGATE FALLOPIAN TUBE,POSTPARTUM      Tubal Ligation             Social History:     Social History     Socioeconomic History    Marital status:      Spouse name: Not on file    Number of children: 1    Years of education: Not on file    Highest education level: Not on file   Occupational History    Occupation: rn, retired      Employer: [employer] [address]   Tobacco Use    Smoking status: Former     Current packs/day: 0.00     Average packs/day: 0.5 packs/day for 10.0 years (5.0 ttl pk-yrs)     Types: Cigarettes     Start date: 10/1/1973     Quit date: 1982     Years since quittin.0    Smokeless tobacco: Never    Tobacco comments:     quit 23 years ago   Vaping Use    Vaping status: Never Used   Substance and Sexual Activity    Alcohol use: No    Drug use: No    Sexual activity: Not Currently     Partners: Male     Birth control/protection: Post-menopausal   Other Topics Concern    Parent/sibling w/ CABG, MI or angioplasty before 65F 55M? No     Service Not Asked    Blood Transfusions Not Asked    Caffeine Concern Not Asked    Occupational Exposure Not Asked    Hobby Hazards Not Asked    Sleep Concern Not Asked    Stress Concern Not Asked    Weight Concern Not Asked    Special Diet No     Comment: heart  healthy, weight watches     Back Care Not Asked    Exercise No    Bike Helmet Not Asked    Seat Belt Not Asked    Self-Exams Not Asked   Social History Narrative    Not on file     Social Drivers of Health     Financial Resource Strain: Low Risk  (1/25/2025)    Financial Resource Strain     Within the past 12 months, have you or your family members you live with been unable to get utilities (heat, electricity) when it was really needed?: No   Food Insecurity: Low Risk  (1/25/2025)    Food Insecurity     Within the past 12 months, did you worry that your food would run out before you got money to buy more?: No     Within the past 12 months, did the food you bought just not last and you didn t have money to get more?: No   Transportation Needs: Low Risk  (1/25/2025)    Transportation Needs     Within the past 12 months, has lack of transportation kept you from medical appointments, getting your medicines, non-medical meetings or appointments, work, or from getting things that you need?: No   Physical Activity: Sufficiently Active (1/25/2025)    Exercise Vital Sign     Days of Exercise per Week: 5 days     Minutes of Exercise per Session: 60 min   Stress: No Stress Concern Present (1/25/2025)    [employer] [place] [employer] - Occupational Stress Questionnaire     Feeling of Stress : Not at all   Social Connections: Unknown (1/25/2025)    Social Connection and Isolation Panel [NHANES]     Frequency of Communication with Friends and Family: Not on file     Frequency of Social Gatherings with Friends and Family: Three times a week     Attends Restorationist Services: Not on file     Active Member of Clubs or Organizations: Not on file     Attends Club or Organization Meetings: Not on file     Marital Status: Not on file   Interpersonal Safety: Low Risk  (1/30/2025)    Interpersonal Safety     Do you feel physically and emotionally safe where you currently live?: Yes     Within the past 12 months, have you been hit,  "slapped, kicked or otherwise physically hurt by someone?: No     Within the past 12 months, have you been humiliated or emotionally abused in other ways by your partner or ex-partner?: No   Housing Stability: Low Risk  (1/25/2025)    Housing Stability     Do you have housing? : Yes     Are you worried about losing your housing?: No             Family History:   reviewed         Allergies:     Allergies   Allergen Reactions    Codeine Nausea and Vomiting    Shrimp Nausea and Vomiting             Medications:     Current Outpatient Medications   Medication Sig Dispense Refill    aspirin (ASA) 81 MG EC tablet Take 1 tablet (81 mg) by mouth daily 90 tablet 3    carvedilol (COREG) 3.125 MG tablet Take 1 tablet (3.125 mg) by mouth 2 times daily (with meals). 180 tablet 3    Cholecalciferol (VITAMIN D3) 125 MCG (5000 UT) TBDP Take 1 tablet by mouth daily      co-enzyme Q-10 100 MG CAPS capsule Take 2 capsules by mouth daily      Cranberry 1000 MG CAPS Take 2 capsules by mouth daily      omeprazole (PRILOSEC) 20 MG DR capsule Take 1 capsule (20 mg) by mouth 2 times daily. 180 capsule 3    rosuvastatin (CRESTOR) 10 MG tablet Take 1 tablet (10 mg) by mouth daily. 90 tablet 3    nitroGLYcerin (NITROSTAT) 0.4 MG sublingual tablet Place 1 tablet (0.4 mg) under the tongue every 5 minutes as needed for chest pain If you are still having symptoms after 3 doses (15 minutes) call 911. 25 tablet 0               Review of Systems:     The 10 point Review of Systems is negative other than noted in the HPI           Physical Exam:   Blood pressure 127/72, pulse 60, temperature 97.1  F (36.2  C), resp. rate 16, height 1.63 m (5' 4.17\"), weight 69.9 kg (154 lb), SpO2 99%, not currently breastfeeding.    Exam:  Constitutional: healthy appearing, alert and in no distress  Heent: Normocephalic. Head without obvious masses or lesions. PERRLDC, EOMI. Mouth exam within normal limits: tongue, mucous membranes, posterior pharynx all normal, no " lesions or abnormalities seen.  Tm's and canals within normal limits bilaterally. Neck supple, no nuchal rigidity or masses. No supraclavicular, or cervical adenopathy. Thyroid symmetric, no masses.  Cardiovascular: Regular rate and rhythm, no murmer, rub or gallops.  JVP not elevated, no edema.  Carotids within normal limits bilaterally, no bruits.  Respiratory: Normal respiratory effort.  Lungs clear, normal flow, no wheezing or crackles.  Breasts: Normal bilaterally.  No masses or lesions.  Nipples within normal limits.  No axillary lesions or nodes.  My M.A. Was present during this part of the examination.  Gastrointestinal: Normal active bowel sounds.   Soft, not tender, no masses, guarding or rebound.  No hepatosplenomegaly.   Musculoskeletal: extremities normal, no gross deformities noted.  Skin: no suspicious lesions or rashes   Neurologic: Mental status within normal limits.  Speech fluent.  No gross motor abnormalities and gait intact.  Psychiatric: mentation appears normal and affect normal.         Data:   Labs sent        Assessment:   Normal complete physical exam  Coronary artery disease, stable, med management  Colon polyp, follow-up as noted  High cholesterol, on statin therapy, has not tolerated higher dose  Osteopenia, DEXA ordered  Reflux, controlled, suggest that she try PPI once daily  Elevated sugar and vitamin D deficiency, labs today  Healthcare maintenance         Plan:   Up-to-date immunizations  Up-to-date mammogram and colon exam  Bone density test  Letter with labs  Exercise and diet      Jesse Tejada M.D.    Appropriate preventive services were discussed with this patient via screening, questionairre, or discussion as appropriate for fall prevention, nutrition, physical activity, social engagement, weight loss and cognition.  Check list reviewing preventive services available has been given to the patient.  Reviewed patient's diet, addressing concerns and questions as appropriate.   Advice given for smoking sensation when appropriate.          Physical (Fasting )          HPI        Health Care Directive  Patient does not have a Health Care Directive:       1/25/2025   General Health   How would you rate your overall physical health? Excellent   Feel stress (tense, anxious, or unable to sleep) Not at all         1/25/2025   Nutrition   Diet: Regular (no restrictions)         1/25/2025   Exercise   Days per week of moderate/strenous exercise 5 days   Average minutes spent exercising at this level 60 min         1/25/2025   Social Factors   Frequency of gathering with friends or relatives Three times a week   Worry food won't last until get money to buy more No   Food not last or not have enough money for food? No   Do you have housing? (Housing is defined as stable permanent housing and does not include staying ouside in a car, in a tent, in an abandoned building, in an overnight shelter, or couch-surfing.) Yes   Are you worried about losing your housing? No   Lack of transportation? No   Unable to get utilities (heat,electricity)? No         1/25/2025   Fall Risk   Fallen 2 or more times in the past year? No    No   Trouble with walking or balance? No    No       Multiple values from one day are sorted in reverse-chronological order          1/25/2025   Activities of Daily Living- Home Safety   Needs help with the following daily activites None of the above   Safety concerns in the home None of the above         1/25/2025   Dental   Dentist two times every year? Yes         1/25/2025   Hearing Screening   Hearing concerns? None of the above         1/25/2025   Driving Risk Screening   Patient/family members have concerns about driving No         1/25/2025   General Alertness/Fatigue Screening   Have you been more tired than usual lately? No         1/25/2025   Urinary Incontinence Screening   Bothered by leaking urine in past 6 months No         1/25/2025   TB Screening   Were you born outside  of the [place]? No         Today's PHQ-2 Score:       2025    10:04 AM   PHQ-2 (  [employer])   Q1: Little interest or pleasure in doing things 0   Q2: Feeling down, depressed or hopeless 0   PHQ-2 Score 0    Q1: Little interest or pleasure in doing things Not at all   Q2: Feeling down, depressed or hopeless Not at all   PHQ-2 Score 0       Patient-reported           2025   Substance Use   Alcohol more than 3/day or more than 7/wk No   Do you have a current opioid prescription? No   How severe/bad is pain from 1 to 10? 0/10 (No Pain)   Do you use any other substances recreationally? No     Social History     Tobacco Use    Smoking status: Former     Current packs/day: 0.00     Average packs/day: 0.5 packs/day for 10.0 years (5.0 ttl pk-yrs)     Types: Cigarettes     Start date: 10/1/1973     Quit date: 1982     Years since quittin.0    Smokeless tobacco: Never    Tobacco comments:     quit 23 years ago   Vaping Use    Vaping status: Never Used   Substance Use Topics    Alcohol use: No    Drug use: No           2025   LAST FHS-7 RESULTS   1st degree relative breast or ovarian cancer No   Any relative bilateral breast cancer No   Any male have breast cancer No   Any ONE woman have BOTH breast AND ovarian cancer No   Any woman with breast cancer before 50yrs No   2 or more relatives with breast AND/OR ovarian cancer No   2 or more relatives with breast AND/OR bowel cancer No            ASCVD Risk   The 10-year ASCVD risk score (Jesus SWANN, et al., 2019) is: 15.6%    Values used to calculate the score:      Age: 74 years      Sex: Female      Is Non- : No      Diabetic: No      Tobacco smoker: No      Systolic Blood Pressure: 127 mmHg      Is BP treated: No      HDL Cholesterol: 52 mg/dL      Total Cholesterol: 175 mg/dL            Reviewed and updated as needed this visit by Provider       Med Hx                Current providers sharing in care for this patient  "include:  Patient Care Team:  Jesse Tejada MD as PCP - General  Jesse Tejada MD as Assigned PCP    The following health maintenance items are reviewed in Epic and correct as of today:  Health Maintenance   Topic Date Due    ANNUAL REVIEW OF HM ORDERS  Never done    RSV VACCINE (1 - 1-dose 75+ series) Never done    LIPID  01/19/2025    MEDICARE ANNUAL WELLNESS VISIT  01/30/2026    FALL RISK ASSESSMENT  01/30/2026    COLORECTAL CANCER SCREENING  04/28/2026    GLUCOSE  01/19/2027    ADVANCE CARE PLANNING  02/10/2028    DTAP/TDAP/TD IMMUNIZATION (3 - Td or Tdap) 02/10/2033    DEXA  10/31/2034    HEPATITIS C SCREENING  Completed    PHQ-2 (once per calendar year)  Completed    INFLUENZA VACCINE  Completed    Pneumococcal Vaccine: 50+ Years  Completed    ZOSTER IMMUNIZATION  Completed    COVID-19 Vaccine  Completed    HPV IMMUNIZATION  Aged Out    MENINGITIS IMMUNIZATION  Aged Out    RSV MONOCLONAL ANTIBODY  Aged Out    MAMMO SCREENING  Discontinued            Objective    Exam  /72   Pulse 60   Temp 97.1  F (36.2  C)   Resp 16   Ht 1.63 m (5' 4.17\")   Wt 69.9 kg (154 lb)   SpO2 99%   BMI 26.29 kg/m     Estimated body mass index is 26.29 kg/m  as calculated from the following:    Height as of this encounter: 1.63 m (5' 4.17\").    Weight as of this encounter: 69.9 kg (154 lb).    Physical Exam           1/30/2025   Mini Cog   Clock Draw Score 2 Normal   3 Item Recall 3 objects recalled   Mini Cog Total Score 5              Signed Electronically by: Jesse Tejada MD    "

## 2025-01-30 NOTE — PATIENT INSTRUCTIONS
Patient Education   Preventive Care Advice   This is general advice given by our system to help you stay healthy. However, your care team may have specific advice just for you. Please talk to your care team about your preventive care needs.  Nutrition  Eat 5 or more servings of fruits and vegetables each day.  Try wheat bread, brown rice and whole grain pasta (instead of white bread, rice, and pasta).  Get enough calcium and vitamin D. Check the label on foods and aim for 100% of the RDA (recommended daily allowance).  Lifestyle  Exercise at least 150 minutes each week  (30 minutes a day, 5 days a week).  Do muscle strengthening activities 2 days a week. These help control your weight and prevent disease.  No smoking.  Wear sunscreen to prevent skin cancer.  Have a dental exam and cleaning every 6 months.  Yearly exams  See your health care team every year to talk about:  Any changes in your health.  Any medicines your care team has prescribed.  Preventive care, family planning, and ways to prevent chronic diseases.  Shots (vaccines)   HPV shots (up to age 26), if you've never had them before.  Hepatitis B shots (up to age 59), if you've never had them before.  COVID-19 shot: Get this shot when it's due.  Flu shot: Get a flu shot every year.  Tetanus shot: Get a tetanus shot every 10 years.  Pneumococcal, hepatitis A, and RSV shots: Ask your care team if you need these based on your risk.  Shingles shot (for age 50 and up)  General health tests  Diabetes screening:  Starting at age 35, Get screened for diabetes at least every 3 years.  If you are younger than age 35, ask your care team if you should be screened for diabetes.  Cholesterol test: At age 39, start having a cholesterol test every 5 years, or more often if advised.  Bone density scan (DEXA): At age 50, ask your care team if you should have this scan for osteoporosis (brittle bones).  Hepatitis C: Get tested at least once in your life.  STIs (sexually  transmitted infections)  Before age 24: Ask your care team if you should be screened for STIs.  After age 24: Get screened for STIs if you're at risk. You are at risk for STIs (including HIV) if:  You are sexually active with more than one person.  You don't use condoms every time.  You or a partner was diagnosed with a sexually transmitted infection.  If you are at risk for HIV, ask about PrEP medicine to prevent HIV.  Get tested for HIV at least once in your life, whether you are at risk for HIV or not.  Cancer screening tests  Cervical cancer screening: If you have a cervix, begin getting regular cervical cancer screening tests starting at age 21.  Breast cancer scan (mammogram): If you've ever had breasts, begin having regular mammograms starting at age 40. This is a scan to check for breast cancer.  Colon cancer screening: It is important to start screening for colon cancer at age 45.  Have a colonoscopy test every 10 years (or more often if you're at risk) Or, ask your provider about stool tests like a FIT test every year or Cologuard test every 3 years.  To learn more about your testing options, visit:   .  For help making a decision, visit:   https://bit.ly/bo58476.  Prostate cancer screening test: If you have a prostate, ask your care team if a prostate cancer screening test (PSA) at age 55 is right for you.  Lung cancer screening: If you are a current or former smoker ages 50 to 80, ask your care team if ongoing lung cancer screenings are right for you.  For informational purposes only. Not to replace the advice of your health care provider. Copyright   2023 [employer]. All rights reserved. Clinically reviewed by the [facility] Transitions Program. [employer] [id number] - REV 01/24.

## 2025-01-30 NOTE — RESULT ENCOUNTER NOTE
It was very nice to see you.  You should be able to view all of your test results.    Your CBC is normal.  Your chemistry panel shows elevated glucose but is otherwise normal.  Your hemoglobin A1c is stable at 6.2.  Please be sure to exercise, keep your weight down and eat a healthy diet for this.    Your total cholesterol is good at 188.  Your HDL or good cholesterol and LDL or bad cholesterol are fine as well.    Lastly your vitamin D level is just fine.    I am happy to bring in this excellent report.  Please let me know if you have questions.    Jesse Tejada M.D.

## 2025-02-04 NOTE — TELEPHONE ENCOUNTER
Retail Pharmacy Prior Authorization Team   Phone: [phone]    PA Initiation    Medication: OMEPRAZOLE 20 MG PO CPDR  Insurance Company: [employer] Non-Specialty PA's - Phone [phone] Fax [phone]  Pharmacy Filling the Rx: [facility] - [address] [place] [address]  Filling Pharmacy Phone: [phone]  Filling Pharmacy Fax: [phone]  Start Date: 2/4/2025

## 2025-02-05 NOTE — TELEPHONE ENCOUNTER
Prior Authorization Approval    Medication: OMEPRAZOLE 20 MG PO CPDR  Authorization Effective Date: 1/5/2025  Authorization Expiration Date: 2/4/2026  Approved Dose/Quantity:   Reference #:     Insurance Company: [employer] Non-Specialty PA's - Phone [phone] Fax [phone]  Expected CoPay: $    CoPay Card Available:      Financial Assistance Needed:   Which Pharmacy is filling the prescription: [facility] [place], [place] - [address] [place] [address]  Pharmacy Notified: Yes  Patient Notified:

## 2025-04-13 ENCOUNTER — HEALTH MAINTENANCE LETTER (OUTPATIENT)
Age: 75
End: 2025-04-13

## (undated) DEVICE — SOL NACL 0.9% IRRIG 3000ML BAG 2B7477

## (undated) DEVICE — SYR 03ML LL W/O NDL 309657

## (undated) DEVICE — NDL 22GA 1" 305155

## (undated) DEVICE — SU VICRYL 1 CTX CR 8X18" J765D

## (undated) DEVICE — GLOVE BIOGEL PI SZ 6.5 40865

## (undated) DEVICE — DRAPE IOBAN INCISE 23X17" 6650EZ

## (undated) DEVICE — SU VICRYL 0 CT-1 CR 8X18" J740D

## (undated) DEVICE — CEMENT PRESSURIZER FEMORAL CANAL MED 0206-546-000

## (undated) DEVICE — ESU GROUND PAD UNIVERSAL W/O CORD

## (undated) DEVICE — DRAPE CONVERTORS U-DRAPE 60X72" 8476

## (undated) DEVICE — SOLUTION WOUND CLEANSING 3/4OZ 10% PVP EA-L3011FB-50

## (undated) DEVICE — DRAPE STERI TOWEL LG 1010

## (undated) DEVICE — GLOVE BIOGEL PI SZ 7.5 40875

## (undated) DEVICE — SUCTION IRR SYSTEM W/O TIP INTERPULSE HANDPIECE 0210-100-000

## (undated) DEVICE — BNDG COBAN 6"X5YDS STERILE

## (undated) DEVICE — GOWN XXLG REINFORCED 9071EL

## (undated) DEVICE — SYR 30ML LL W/O NDL 302832

## (undated) DEVICE — LINEN TOWEL PACK X5 5464

## (undated) DEVICE — DRSG AQUACEL AG HYDROFIBER  3.5X10" 422605

## (undated) DEVICE — BONE CEMENT BIOPREP FEMORAL PREP PLUG INSERTER 0206-710-000

## (undated) DEVICE — DRSG AQUACEL AG 3.5X9.75" HYDROFIBER 412011

## (undated) DEVICE — BLADE SAW SAGITTAL STRK MED WIDE 25X73X0.89MM 2108-105-000

## (undated) DEVICE — GLOVE BIOGEL PI MICRO INDICATOR UNDERGLOVE SZ 7.0 48970

## (undated) DEVICE — MANIFOLD NEPTUNE 4 PORT 700-20

## (undated) DEVICE — PREP SKIN SCRUB TRAY 4461A

## (undated) DEVICE — SOL WATER IRRIG 1000ML BOTTLE 2F7114

## (undated) DEVICE — STPL SKIN 35W 6.9MM  PXW35

## (undated) DEVICE — GLOVE BIOGEL PI MICRO INDICATOR UNDERGLOVE SZ 8.0 48980

## (undated) DEVICE — HOOD SURG T7PLUS PEEL AWAY FACE SHIELD STRL LF 0416-801-100

## (undated) DEVICE — ESU ELEC BLADE 6" COATED E1450-6

## (undated) DEVICE — SOL NACL 0.9% INJ 1000ML BAG 2B1324X

## (undated) DEVICE — PAD FOAM MCGUIRE KIT 0814-0150

## (undated) DEVICE — SU VICRYL 2-0 CT-1 27" UND J259H

## (undated) DEVICE — GOWN IMPERVIOUS SPECIALTY XLG/XLONG 32474

## (undated) DEVICE — PACK TOTAL HIP W/POUCH SOP15HPFSM

## (undated) DEVICE — PACK UNIVERSAL SPLIT 29131

## (undated) RX ORDER — FENTANYL CITRATE 50 UG/ML
INJECTION, SOLUTION INTRAMUSCULAR; INTRAVENOUS
Status: DISPENSED
Start: 2023-05-25

## (undated) RX ORDER — ACETAMINOPHEN 325 MG/1
TABLET ORAL
Status: DISPENSED
Start: 2023-05-25

## (undated) RX ORDER — DEXAMETHASONE SODIUM PHOSPHATE 4 MG/ML
INJECTION, SOLUTION INTRA-ARTICULAR; INTRALESIONAL; INTRAMUSCULAR; INTRAVENOUS; SOFT TISSUE
Status: DISPENSED
Start: 2023-05-25

## (undated) RX ORDER — TRANEXAMIC ACID 650 MG/1
TABLET ORAL
Status: DISPENSED
Start: 2023-05-25

## (undated) RX ORDER — FENTANYL CITRATE 50 UG/ML
INJECTION, SOLUTION INTRAMUSCULAR; INTRAVENOUS
Status: DISPENSED
Start: 2021-04-28

## (undated) RX ORDER — VANCOMYCIN HYDROCHLORIDE 1 G/20ML
INJECTION, POWDER, LYOPHILIZED, FOR SOLUTION INTRAVENOUS
Status: DISPENSED
Start: 2023-05-25

## (undated) RX ORDER — ONDANSETRON 2 MG/ML
INJECTION INTRAMUSCULAR; INTRAVENOUS
Status: DISPENSED
Start: 2023-05-25

## (undated) RX ORDER — EPHEDRINE SULFATE 50 MG/ML
INJECTION, SOLUTION INTRAMUSCULAR; INTRAVENOUS; SUBCUTANEOUS
Status: DISPENSED
Start: 2023-05-25

## (undated) RX ORDER — PROPOFOL 10 MG/ML
INJECTION, EMULSION INTRAVENOUS
Status: DISPENSED
Start: 2023-05-25